# Patient Record
Sex: FEMALE | Race: WHITE | NOT HISPANIC OR LATINO | Employment: PART TIME | URBAN - METROPOLITAN AREA
[De-identification: names, ages, dates, MRNs, and addresses within clinical notes are randomized per-mention and may not be internally consistent; named-entity substitution may affect disease eponyms.]

---

## 2018-02-19 ENCOUNTER — ANESTHESIA - HEALTHEAST (OUTPATIENT)
Dept: SURGERY | Facility: CLINIC | Age: 23
End: 2018-02-19

## 2018-02-20 ENCOUNTER — SURGERY - HEALTHEAST (OUTPATIENT)
Dept: SURGERY | Facility: CLINIC | Age: 23
End: 2018-02-20

## 2018-10-07 ENCOUNTER — COMMUNICATION - HEALTHEAST (OUTPATIENT)
Dept: SCHEDULING | Facility: CLINIC | Age: 23
End: 2018-10-07

## 2019-04-26 ENCOUNTER — COMMUNICATION - HEALTHEAST (OUTPATIENT)
Dept: SCHEDULING | Facility: CLINIC | Age: 24
End: 2019-04-26

## 2019-07-16 ENCOUNTER — RECORDS - HEALTHEAST (OUTPATIENT)
Dept: LAB | Facility: CLINIC | Age: 24
End: 2019-07-16

## 2019-07-16 LAB
ALT SERPL W P-5'-P-CCNC: 9 U/L (ref 0–45)
AST SERPL W P-5'-P-CCNC: 11 U/L (ref 0–40)
BASOPHILS # BLD AUTO: 0 THOU/UL (ref 0–0.2)
BASOPHILS NFR BLD AUTO: 0 % (ref 0–2)
CREAT SERPL-MCNC: 0.57 MG/DL (ref 0.6–1.1)
CREAT UR-MCNC: 39.1 MG/DL
EOSINOPHIL # BLD AUTO: 0.1 THOU/UL (ref 0–0.4)
EOSINOPHIL NFR BLD AUTO: 0 % (ref 0–6)
ERYTHROCYTE [DISTWIDTH] IN BLOOD BY AUTOMATED COUNT: 12 % (ref 11–14.5)
GFR SERPL CREATININE-BSD FRML MDRD: >60 ML/MIN/1.73M2
HCT VFR BLD AUTO: 32 % (ref 35–47)
HGB BLD-MCNC: 11.1 G/DL (ref 12–16)
LYMPHOCYTES # BLD AUTO: 2.5 THOU/UL (ref 0.8–4.4)
LYMPHOCYTES NFR BLD AUTO: 14 % (ref 20–40)
MCH RBC QN AUTO: 33 PG (ref 27–34)
MCHC RBC AUTO-ENTMCNC: 34.7 G/DL (ref 32–36)
MCV RBC AUTO: 95 FL (ref 80–100)
MONOCYTES # BLD AUTO: 1.1 THOU/UL (ref 0–0.9)
MONOCYTES NFR BLD AUTO: 6 % (ref 2–10)
NEUTROPHILS # BLD AUTO: 14 THOU/UL (ref 2–7.7)
NEUTROPHILS NFR BLD AUTO: 79 % (ref 50–70)
PLATELET # BLD AUTO: 358 THOU/UL (ref 140–440)
PMV BLD AUTO: 10.4 FL (ref 8.5–12.5)
PROTEIN, RANDOM URINE - HISTORICAL: 8 MG/DL
PROTEIN/CREAT RATIO, RANDOM UR: 0.2
RBC # BLD AUTO: 3.36 MILL/UL (ref 3.8–5.4)
URATE SERPL-MCNC: 2.9 MG/DL (ref 2–7.5)
WBC: 17.9 THOU/UL (ref 4–11)

## 2019-08-14 ENCOUNTER — RECORDS - HEALTHEAST (OUTPATIENT)
Dept: ADMINISTRATIVE | Facility: OTHER | Age: 24
End: 2019-08-14

## 2019-08-14 ENCOUNTER — HOSPITAL ENCOUNTER (OUTPATIENT)
Dept: MEDSURG UNIT | Facility: CLINIC | Age: 24
Discharge: HOME | End: 2019-08-14
Attending: OBSTETRICS & GYNECOLOGY | Admitting: OBSTETRICS & GYNECOLOGY

## 2019-08-14 LAB
ALBUMIN UR-MCNC: NEGATIVE MG/DL
AMPHETAMINES UR QL SCN: NORMAL
APPEARANCE UR: CLEAR
BARBITURATES UR QL: NORMAL
BENZODIAZ UR QL: NORMAL
BILIRUB UR QL STRIP: NEGATIVE
CANNABINOIDS UR QL SCN: NORMAL
CLUE CELLS: NORMAL
COCAINE UR QL: NORMAL
COLOR UR AUTO: NORMAL
CREAT UR-MCNC: 12.4 MG/DL
ETHANOL UR CFM-MCNC: <10 MG/DL
FIBRONECTIN FETAL VAG QL: POSITIVE
GLUCOSE UR STRIP-MCNC: NEGATIVE MG/DL
HGB UR QL STRIP: NEGATIVE
KETONES UR STRIP-MCNC: NEGATIVE MG/DL
LEUKOCYTE ESTERASE UR QL STRIP: NEGATIVE
METHADONE UR QL SCN: NORMAL
NITRATE UR QL: NEGATIVE
OPIATES UR QL SCN: NORMAL
OXYCODONE UR QL: NORMAL
PCP UR QL SCN: NORMAL
PH UR STRIP: 8 [PH] (ref 4.5–8)
RUPTURE OF FETAL MEMBRANES BY ROM PLUS: NEGATIVE
SP GR UR STRIP: 1 (ref 1–1.03)
TRICHOMONAS, WET PREP: NORMAL
UROBILINOGEN UR STRIP-ACNC: NORMAL
YEAST, WET PREP: NORMAL

## 2019-08-14 ASSESSMENT — MIFFLIN-ST. JEOR: SCORE: 1506.11

## 2019-08-16 LAB
ALLERGIC TO PENICILLIN: NORMAL
GP B STREP DNA SPEC QL NAA+PROBE: NEGATIVE

## 2019-09-09 ENCOUNTER — ANESTHESIA - HEALTHEAST (OUTPATIENT)
Dept: OBGYN | Facility: CLINIC | Age: 24
End: 2019-09-09

## 2019-09-13 ENCOUNTER — COMMUNICATION - HEALTHEAST (OUTPATIENT)
Dept: OBGYN | Facility: CLINIC | Age: 24
End: 2019-09-13

## 2019-10-17 ENCOUNTER — AMBULATORY - HEALTHEAST (OUTPATIENT)
Dept: NURSING | Facility: CLINIC | Age: 24
End: 2019-10-17

## 2019-12-10 ENCOUNTER — RECORDS - HEALTHEAST (OUTPATIENT)
Dept: ADMINISTRATIVE | Facility: OTHER | Age: 24
End: 2019-12-10

## 2019-12-10 ASSESSMENT — MIFFLIN-ST. JEOR
SCORE: 1395.89
SCORE: 1415.4

## 2019-12-11 ENCOUNTER — SURGERY - HEALTHEAST (OUTPATIENT)
Dept: SURGERY | Facility: HOSPITAL | Age: 24
End: 2019-12-11

## 2019-12-11 ENCOUNTER — ANESTHESIA - HEALTHEAST (OUTPATIENT)
Dept: MEDSURG UNIT | Facility: HOSPITAL | Age: 24
End: 2019-12-11

## 2019-12-11 ENCOUNTER — ANESTHESIA - HEALTHEAST (OUTPATIENT)
Dept: SURGERY | Facility: HOSPITAL | Age: 24
End: 2019-12-11

## 2019-12-11 ENCOUNTER — RECORDS - HEALTHEAST (OUTPATIENT)
Dept: ADMINISTRATIVE | Facility: OTHER | Age: 24
End: 2019-12-11

## 2019-12-11 ASSESSMENT — MIFFLIN-ST. JEOR: SCORE: 1405.87

## 2019-12-12 ENCOUNTER — COMMUNICATION - HEALTHEAST (OUTPATIENT)
Dept: SURGERY | Facility: CLINIC | Age: 24
End: 2019-12-12

## 2019-12-18 ENCOUNTER — SURGERY - HEALTHEAST (OUTPATIENT)
Dept: SURGERY | Facility: HOSPITAL | Age: 24
End: 2019-12-18

## 2019-12-18 ENCOUNTER — ANESTHESIA - HEALTHEAST (OUTPATIENT)
Dept: SURGERY | Facility: HOSPITAL | Age: 24
End: 2019-12-18

## 2019-12-18 ASSESSMENT — MIFFLIN-ST. JEOR
SCORE: 1392.72
SCORE: 1401.79

## 2019-12-31 ENCOUNTER — OFFICE VISIT - HEALTHEAST (OUTPATIENT)
Dept: SURGERY | Facility: CLINIC | Age: 24
End: 2019-12-31

## 2020-01-14 ENCOUNTER — OFFICE VISIT - HEALTHEAST (OUTPATIENT)
Dept: SURGERY | Facility: CLINIC | Age: 25
End: 2020-01-14

## 2020-01-14 ENCOUNTER — HOSPITAL ENCOUNTER (OUTPATIENT)
Dept: CT IMAGING | Facility: HOSPITAL | Age: 25
Discharge: HOME | End: 2020-01-14
Attending: SURGERY

## 2020-01-14 DIAGNOSIS — K85.10 GALLSTONE PANCREATITIS: ICD-10-CM

## 2020-01-14 ASSESSMENT — MIFFLIN-ST. JEOR: SCORE: 1383.18

## 2020-01-16 ENCOUNTER — COMMUNICATION - HEALTHEAST (OUTPATIENT)
Dept: SURGERY | Facility: CLINIC | Age: 25
End: 2020-01-16

## 2020-02-27 ENCOUNTER — ANESTHESIA - HEALTHEAST (OUTPATIENT)
Dept: SURGERY | Facility: HOSPITAL | Age: 25
End: 2020-02-27

## 2020-02-28 ENCOUNTER — SURGERY - HEALTHEAST (OUTPATIENT)
Dept: SURGERY | Facility: HOSPITAL | Age: 25
End: 2020-02-28

## 2020-04-27 ENCOUNTER — COMMUNICATION - HEALTHEAST (OUTPATIENT)
Dept: SCHEDULING | Facility: CLINIC | Age: 25
End: 2020-04-27

## 2020-04-28 ENCOUNTER — DOCUMENTATION ONLY (OUTPATIENT)
Dept: OTHER | Facility: CLINIC | Age: 25
End: 2020-04-28

## 2020-04-28 ENCOUNTER — AMBULATORY - HEALTHEAST (OUTPATIENT)
Dept: OTHER | Facility: CLINIC | Age: 25
End: 2020-04-28

## 2020-07-02 ENCOUNTER — COMMUNICATION - HEALTHEAST (OUTPATIENT)
Dept: SURGERY | Facility: CLINIC | Age: 25
End: 2020-07-02

## 2020-09-10 ENCOUNTER — AMBULATORY - HEALTHEAST (OUTPATIENT)
Dept: NURSING | Facility: CLINIC | Age: 25
End: 2020-09-10

## 2020-11-17 ENCOUNTER — RECORDS - HEALTHEAST (OUTPATIENT)
Dept: ADMINISTRATIVE | Facility: OTHER | Age: 25
End: 2020-11-17

## 2021-03-02 ENCOUNTER — COMMUNICATION - HEALTHEAST (OUTPATIENT)
Dept: SURGERY | Facility: CLINIC | Age: 26
End: 2021-03-02

## 2021-05-28 NOTE — TELEPHONE ENCOUNTER
"Pt reports she is 17 weeks pregnant and she was seen in the ED yesterday. R leg and arm numbness since yesterday and now in L arm since an hour ago as well as \"8\" headache. Nosebleed this morning. Per ER notes pt had an incomplete work up and was to return if symptoms worsened. Pt reports symptoms have worsened.    Advised pt to return to ER now. Have another adult drive.    Pt verbalizes understanding and agrees to plan.     Reason for Disposition    Nursing judgment or information in reference    Protocols used: NO GUIDELINE [id number]      "

## 2021-05-31 NOTE — PROGRESS NOTES
Patient given discharge instructions, pt has all belongings; questions answered. Pt was able to ambulate independently off unit without difficulty. Pt knows to follow up with her provider at next clinic visit.

## 2021-05-31 NOTE — PROGRESS NOTES
pt at 33 weeks day of Dr Lyons. Pt comes in c/o abd pain starting last night and continuing when she woke this morning.  She denies leaking of fluids.  Pt reports loose stool x 1 with a quarter size of blood.

## 2021-06-01 ENCOUNTER — RECORDS - HEALTHEAST (OUTPATIENT)
Dept: ADMINISTRATIVE | Facility: CLINIC | Age: 26
End: 2021-06-01

## 2021-06-01 VITALS — WEIGHT: 152.19 LBS | BODY MASS INDEX: 25.33 KG/M2

## 2021-06-01 NOTE — ANESTHESIA PREPROCEDURE EVALUATION
Anesthesia Evaluation      Patient summary reviewed   History of anesthetic complications     Airway   Mallampati: II  Neck ROM: full   Pulmonary                           Cardiovascular - negative ROS  Exercise tolerance: > or = 4 METS   Neuro/Psych - negative ROS     Endo/Other    (+) obesity, pregnant     GI/Hepatic/Renal - negative ROS           Dental                         Anesthesia Plan  Planned anesthetic: epidural  LE  ASA 2     Anesthetic plan and risks discussed with: patient    Post-op plan: routine recovery

## 2021-06-01 NOTE — TELEPHONE ENCOUNTER
:   N/A    Language:   English    Discharge Follow-Up:  Follow-Up call by Outreach nurse: Call placed    Type of Delivery:      Feeding Method:  JOHANA    Feedings in 24Hrs:  >8x/Day    Breast engorgement:   N/A    Nipple tenderness:   N/A    Non-nursing engorgement discussed:   Yes    Amount of Feedin-2 oz    Number of Infant Stools in 24 hours:   >3    Stool:  Transition    Number of Infant voids in 24 hours:  >3    Urine:  Clear    Infant Jaundice:   None    Discussed increasing s/s of Jaundice:   Yes    Circumcision:   Circ on TUes in clinic    Maternal s/s of infection:   None    Maternal s/s of PIH:   WDL    Postpartum cramping:   Mild    Comfort:  Adequate with routine pain meds    Vaginal Bleeding:  WDL    S/S of Maternal Thrombosis:  None    Maternal BM Since Delivery:  Yes    Referrals:   None    Resources Used During Phone Call:  HEPS    Comments:   Spoke with pt/mom and she states that things are going well overall. NB is johana feeding and mom states he is taking 2 oz per feeding. Was at clinic yesterday and had no concerns. Plan to fu on Tues for wt check and circ. Mom denies any concerns for herself. Edu disused. Questions answered.    Completed by:   Ximena Guillen RN

## 2021-06-01 NOTE — ANESTHESIA PROCEDURE NOTES
Epidural Block    Patient location during procedure: OB  Time Called: 9/9/2019 11:27 AM  Reason for Block:labor epidural  Staffing:  Performing  Anesthesiologist: Garth Mancera MD  Preanesthetic Checklist  Completed: patient identified, risks, benefits, and alternatives discussed, timeout performed, consent obtained, at patient's request, airway assessed, oxygen available, suction available, emergency drugs available and hand hygiene performed  Procedure  Patient position: sitting  Prep: ChloraPrep  Patient monitoring: continuous pulse oximetry, heart rate and blood pressure  Approach: midline  Location: L3-L4  Injection technique: DAYSI saline  Number of Attempts:2 (Blood with first catheter, replaced at the same level)  Needle  Needle type: Waylon   Needle gauge: 18 G     Catheter in Space: 4 (5 cm to EDS)  Assessment  Sensory level:  No complications    Events: blood aspirated

## 2021-06-01 NOTE — ANESTHESIA POSTPROCEDURE EVALUATION
Patient: Renata Staton  * No procedures listed *  Anesthesia type: No value filed.    Patient location: PACU  Last vitals: No vitals data found for the desired time range.    Post vital signs: stable  Level of consciousness: awake and responds to simple questions  Post-anesthesia pain: pain controlled  Post-anesthesia nausea and vomiting: no  Pulmonary: unassisted, return to baseline  Cardiovascular: stable and blood pressure at baseline  Hydration: adequate  Anesthetic events: no    QCDR Measures:  ASA# 11 - Naty-op Cardiac Arrest: ASA11B - Patient did NOT experience unanticipated cardiac arrest  ASA# 12 - Naty-op Mortality Rate: ASA12B - Patient did NOT die  ASA# 13 - PACU Re-Intubation Rate: ASA13B - Patient did NOT require a new airway mgmt  ASA# 10 - Composite Anes Safety: ASA10A - No serious adverse event    Additional Notes:

## 2021-06-02 ENCOUNTER — RECORDS - HEALTHEAST (OUTPATIENT)
Dept: ADMINISTRATIVE | Facility: CLINIC | Age: 26
End: 2021-06-02

## 2021-06-03 VITALS — WEIGHT: 170 LBS | BODY MASS INDEX: 29.02 KG/M2 | HEIGHT: 64 IN

## 2021-06-04 VITALS
HEIGHT: 64 IN | BODY MASS INDEX: 30.04 KG/M2 | WEIGHT: 150 LBS | BODY MASS INDEX: 25.01 KG/M2 | BODY MASS INDEX: 25.75 KG/M2 | HEIGHT: 64 IN | BODY MASS INDEX: 30.04 KG/M2 | WEIGHT: 145.7 LBS

## 2021-06-04 VITALS
BODY MASS INDEX: 25.25 KG/M2 | HEIGHT: 64 IN | WEIGHT: 147.9 LBS | HEIGHT: 64 IN | WEIGHT: 147.9 LBS | BODY MASS INDEX: 25.01 KG/M2 | BODY MASS INDEX: 25.39 KG/M2

## 2021-06-04 VITALS — WEIGHT: 147 LBS | HEIGHT: 64 IN | BODY MASS INDEX: 25.1 KG/M2

## 2021-06-04 NOTE — ANESTHESIA PREPROCEDURE EVALUATION
Anesthesia Evaluation      Patient summary reviewed   History of anesthetic complications (family hx of pseudocholinesterase deficiency)     Airway   Mallampati: II  Neck ROM: full   Pulmonary     breath sounds clear to auscultation  (+) a smoker  (-) asthma                         Cardiovascular - negative ROS  Exercise tolerance: > or = 4 METS  Rhythm: regular        Neuro/Psych      Endo/Other - negative ROS   (-) not pregnant     GI/Hepatic/Renal      Comments: Elevated AST s/p lap albin     Other findings:     NPO > 8 hrs     Results for JULIO ASCENCIO (MRN [id number]) as of 12/18/2019 12/18/2019 08:16  Sodium: 139  Potassium: 3.9  Chloride: 106  CO2: 25  Anion Gap, Calculation: 8  BUN: 11  Creatinine: 0.74  GFR MDRD Af Amer: >60  GFR MDRD Non Af Amer: >60  Calcium: 9.5  AST: 1,225 (H)  ALT: 635 (H)  ALBUMIN: 4.0  Protein, Total: 7.3  Alkaline Phosphatase: 258 (H)  Bilirubin, Total: 1.5 (H)  Bilirubin, Direct: 0.7 (H)  Glucose: 68 (L)  Lipase: 18,520 (H)  WBC: 9.0  RBC: 4.61  Hemoglobin: 13.3  Hematocrit: 40.2  MCV: 87  MCH: 28.9  MCHC: 33.1  RDW: 13.2  Platelets: 367  MPV: 9.7        Dental - normal exam                        Anesthesia Plan  Planned anesthetic: general endotracheal      Propofol gtt  Scop patch  Ketamine  Dilaudid 0.5 mg  No sux    ASA 2 - emergent   Induction: intravenous   Anesthetic plan and risks discussed with: patient  Anesthesia plan special considerations: antiemetics,   Post-op plan: routine recovery

## 2021-06-04 NOTE — ANESTHESIA CARE TRANSFER NOTE
Last vitals:   Vitals:    12/18/19 1807   BP: 138/60   Pulse: 74   Resp: 14   Temp: 36.8  C (98.3  F)   SpO2: 100%     Patient's level of consciousness is drowsy but awake. VSS. Appears comfortable.   Spontaneous respirations: yes  Maintains airway independently: yes  Dentition unchanged: yes  Oropharynx: oropharynx clear of all foreign objects    QCDR Measures:  ASA# 20 - Surgical Safety Checklist: WHO surgical safety checklist completed prior to induction    PQRS# 430 - Adult PONV Prevention: 4558F - Pt received => 2 anti-emetic agents (different classes) preop & intraop  ASA# 8 - Peds PONV Prevention: NA - Not pediatric patient, not GA or 2 or more risk factors NOT present  PQRS# 424 - Naty-op Temp Management: 4559F - At least one body temp DOCUMENTED => 35.5C or 95.9F within required timeframe  PQRS# 426 - PACU Transfer Protocol: - Transfer of care checklist used  ASA# 14 - Acute Post-op Pain: ASA14B - Patient did NOT experience pain >= 7 out of 10

## 2021-06-04 NOTE — ANESTHESIA POSTPROCEDURE EVALUATION
Patient: Renata Staton  CHOLECYSTECTOMY, LAPAROSCOPIC, WITH INTRAOP CHOLANGIOGRAMS  Anesthesia type: general    Patient location: PACU  Last vitals:   Vitals Value Taken Time   /65 12/11/2019 12:30 PM   Temp 36.9  C (98.5  F) 12/11/2019 12:30 PM   Pulse 95 12/11/2019 12:30 PM   Resp 16 12/11/2019 12:30 PM   SpO2 95 % 12/11/2019 12:30 PM     Post vital signs: stable  Level of consciousness: awake and responds to simple questions  Post-anesthesia pain: pain controlled  Post-anesthesia nausea and vomiting: no  Pulmonary: unassisted, return to baseline  Cardiovascular: stable and blood pressure at baseline  Hydration: adequate  Anesthetic events: no    QCDR Measures:  ASA# 11 - Naty-op Cardiac Arrest: ASA11B - Patient did NOT experience unanticipated cardiac arrest  ASA# 12 - Naty-op Mortality Rate: ASA12B - Patient did NOT die  ASA# 13 - PACU Re-Intubation Rate: ASA13B - Patient did NOT require a new airway mgmt  ASA# 10 - Composite Anes Safety: ASA10A - No serious adverse event    Additional Notes:

## 2021-06-04 NOTE — ANESTHESIA CARE TRANSFER NOTE
Last vitals:   Vitals:    12/11/19 0949   BP: 123/58   Pulse: 96   Resp: 16   Temp: 37.1  C (98.8  F)   SpO2: 100%     Patient's level of consciousness is awake and drowsy  Spontaneous respirations: yes  Maintains airway independently: yes  Dentition unchanged: yes  Oropharynx: oropharynx clear of all foreign objects    QCDR Measures:  ASA# 20 - Surgical Safety Checklist: WHO surgical safety checklist completed prior to induction    PQRS# 430 - Adult PONV Prevention: 4558F - Pt received => 2 anti-emetic agents (different classes) preop & intraop  ASA# 8 - Peds PONV Prevention: NA - Not pediatric patient, not GA or 2 or more risk factors NOT present  PQRS# 424 - Naty-op Temp Management: 4559F - At least one body temp DOCUMENTED => 35.5C or 95.9F within required timeframe  PQRS# 426 - PACU Transfer Protocol: - Transfer of care checklist used  ASA# 14 - Acute Post-op Pain: ASA14B - Patient did NOT experience pain >= 7 out of 10 additional 0.5mg dilaudid given upon arrival to PACU    Volatile agents turned off, muscle relaxant reversed, 4/4 twitches with sustained tetany. Pt breathing spontaneously with adequate tidal volumes, following commands, gently suctioned oropharynx, extubated without issue. 10LPM O2 applied via face mask.Transported by CRNA and RN to recovery.

## 2021-06-04 NOTE — ANESTHESIA PREPROCEDURE EVALUATION
Anesthesia Evaluation      History of anesthetic complications     Airway   Mallampati: II  Neck ROM: full   Pulmonary     breath sounds clear to auscultation  (-) not a smoker                         Cardiovascular   Exercise tolerance: > or = 4 METS  (-) hypertension  Rhythm: regular  Rate: normal,         Neuro/Psych    (+) anxiety/panic attacks,     Endo/Other    (-) no diabetes, no obesity, not pregnant     GI/Hepatic/Renal      Comments: Acute pancreatitis, lipase trending down  Acute cholecystitis      Other findings: Family hx of pseudocholinesterase deficiency.  Hbg 11.6  Plt 275,000  Na 140  K 3.6  BUN/Cr 5/0.61  AST/ALT elevated  Lipase trending down   Preg negative      Dental    (+) poor dentition    Comment: No loose or removable teeth.                        Anesthesia Plan  Planned anesthetic: general endotracheal  GETA (airway exam reassuring, DL 7.0 ETT)  1 PIV  Propofol gtt  Fentanyl, ketamine 30 mg on induction, ketorolac 15 mg if ok w/ Dr. Portillo   Scopolamine, dexamethasone 10 mg, ondansetron 4 mg  ASA 2   Induction: intravenous   Anesthetic plan and risks discussed with: patient  Anesthesia plan special considerations: antiemetics,   Post-op plan: routine recovery

## 2021-06-04 NOTE — ANESTHESIA POSTPROCEDURE EVALUATION
Patient: Renata Staton  ENDOSCOPIC RETROGRADE CHOLANGIOPANCREATOGRAPHY with billary sphinctomy and stone extraction and  billary stent placement with epinephrine injection   Anesthesia type: general    Patient location: PACU  Last vitals:   Vitals Value Taken Time   /78 12/18/2019  6:31 PM   Temp 36.8  C (98.3  F) 12/18/2019  6:07 PM   Pulse 64 12/18/2019  6:37 PM   Resp 22 12/18/2019  6:37 PM   SpO2 97 % 12/18/2019  6:37 PM   Vitals shown include unvalidated device data.  Post vital signs: stable  Level of consciousness: awake and responds to simple questions  Post-anesthesia pain: pain controlled  Post-anesthesia nausea and vomiting: no  Pulmonary: unassisted, return to baseline  Cardiovascular: stable and blood pressure at baseline  Hydration: adequate  Anesthetic events: no    QCDR Measures:  ASA# 11 - Naty-op Cardiac Arrest: ASA11B - Patient did NOT experience unanticipated cardiac arrest  ASA# 12 - Naty-op Mortality Rate: ASA12B - Patient did NOT die  ASA# 13 - PACU Re-Intubation Rate: ASA13B - Patient did NOT require a new airway mgmt  ASA# 10 - Composite Anes Safety: ASA10A - No serious adverse event    Additional Notes:

## 2021-06-05 VITALS — WEIGHT: 132 LBS | BODY MASS INDEX: 21.97 KG/M2

## 2021-06-05 NOTE — TELEPHONE ENCOUNTER
Pts Care Provider: Dr. Portillo    Caller: Gloria    Phone Number: [phone]  OK to leave message: Yes    Reason for Call: PT is returning Dr. Portillo's calls from 1/15/20. She seems worried about the amount of times she reached out to her.    Please call

## 2021-06-05 NOTE — PROGRESS NOTES
"General Surgery Clinic Note    S: Pt presents s/p lap albin with IOC 12/11 for gallstone pancreatitis. Pt with retained CBD stone postop and required readmission for ERCP 12/18. Pt states since discharge, she has had persistent epigastric pain radiating to her back. The pain is mild, 3-4/10 but still interferes with daily activities and has caused her to have a decreased appetite. She has also had diarrhea since discharge. She was seen in ER on 1/11 and noted to have normal labs so was discharged and scheduled for followup today. Pt denies fever, chills, nausea, emesis.    O:   Vitals-   Vitals:    01/14/20 1418   BP: 100/70   Pulse: 81   SpO2: 98%   Weight: 144 lb (65.3 kg)   Height: 5' 4\" (1.626 m)     Gen- alert, pleasant, appears well  Abd- soft, nondistended, mild tenderness to palpation epigastrium, no rebound or guarding, incisions well-healed, no erythema or drainage    A/P: 24y F with h/o gallstone pancreatitis s/p lap albin with IOC 12/11 and ERCP 12/18. Patient with persistent epigastric pain but her recent labs were normal making recurrent pancreatitis or choledocholithiasis unlikely. Given she has had 2 episodes of severe pancreatitis (lipase >18,500 on last admission), I am concerned for possible pseudocyst.    -CT A/P with IV contrast ordered as outpatient  -Further recommendations and followup depending on results of imaging    Samia Portillo MD  General Surgery  [facility]   [phone]      "

## 2021-06-06 NOTE — ANESTHESIA PREPROCEDURE EVALUATION
Anesthesia Evaluation      Patient summary reviewed   History of anesthetic complications (patient has FH of slow to wake, she denies any)     Airway   Mallampati: II  Neck ROM: full   Pulmonary - normal exam                          Cardiovascular   Exercise tolerance: > or = 4 METS  Rhythm: regular  Rate: normal,         Neuro/Psych - negative ROS     Endo/Other - negative ROS      GI/Hepatic/Renal - negative ROS           Dental                         Anesthesia Plan  Planned anesthetic: MAC    ASA 2     Anesthetic plan and risks discussed with: patient    Post-op plan: routine recovery      Patient rates pain 7/10 before stating.  She denies taking opioids.

## 2021-06-06 NOTE — ANESTHESIA POSTPROCEDURE EVALUATION
Patient: Renata Staton  Procedure(s):  ENDOSCOPIC RETROGRADE CHOLANGIOPANCREATOGRAPHY, STENT REMOVAL, SLUDGE REMOVAL  Anesthesia type: MAC    Patient location: PACU  Last vitals:   Vitals Value Taken Time   /61 2/28/2020  8:00 AM   Temp 36.9  C (98.4  F) 2/28/2020  7:50 AM   Pulse 78 2/28/2020  8:13 AM   Resp 15 2/28/2020  8:13 AM   SpO2 98 % 2/28/2020  8:13 AM   Vitals shown include unvalidated device data.  Post vital signs: stable  Level of consciousness: awake and responds to simple questions  Post-anesthesia pain: pain controlled  Post-anesthesia nausea and vomiting: no  Pulmonary: unassisted, return to baseline  Cardiovascular: stable and blood pressure at baseline  Hydration: adequate  Anesthetic events: no    QCDR Measures:  ASA# 11 - Naty-op Cardiac Arrest: ASA11B - Patient did NOT experience unanticipated cardiac arrest  ASA# 12 - Naty-op Mortality Rate: ASA12B - Patient did NOT die  ASA# 13 - PACU Re-Intubation Rate: NA - No ETT / LMA used for case  ASA# 10 - Composite Anes Safety: ASA10A - No serious adverse event    Additional Notes:

## 2021-06-06 NOTE — ANESTHESIA CARE TRANSFER NOTE
Last vitals:   Vitals:    02/28/20 0534   BP: 114/71   Pulse: (!) 105   Resp: 16   Temp: 36.8  C (98.2  F)   SpO2: 100%     Patient's level of consciousness is drowsy  Spontaneous respirations: yes  Maintains airway independently: yes  Dentition unchanged: yes  Oropharynx: oropharynx clear of all foreign objects    QCDR Measures:  ASA# 20 - Surgical Safety Checklist: WHO surgical safety checklist completed prior to induction    PQRS# 430 - Adult PONV Prevention: 4558F - Pt received => 2 anti-emetic agents (different classes) preop & intraop  ASA# 8 - Peds PONV Prevention: NA - Not pediatric patient, not GA or 2 or more risk factors NOT present  PQRS# 424 - Naty-op Temp Management: 4559F - At least one body temp DOCUMENTED => 35.5C or 95.9F within required timeframe  PQRS# 426 - PACU Transfer Protocol: - Transfer of care checklist used  ASA# 14 - Acute Post-op Pain: ASA14B - Patient did NOT experience pain >= 7 out of 10

## 2021-06-07 NOTE — TELEPHONE ENCOUNTER
"Pt reports \"tightness in chest, tingling and numbness in throat and tongue\" a half hour after taking Imitrex. Pt reports \"I can't breath in deep\". Pt is able to speak in full sentences and is not wheezing or gasping for air. Pt states she called pharmacist who told her symptoms may be from an allergic reaction. Pt is with her boyfriend.    Advised pt to have her boyfriend bring her to the ER now. If symptoms worsen at all call 911.    Pt verbalizes understanding and agrees to plan.     Reason for Disposition    [1] Tightness in the chest or throat AND [2] begins within 2 hours of exposure to allergic substance    Protocols used: [id number]      "

## 2021-06-09 NOTE — TELEPHONE ENCOUNTER
Received a voicemail from Gloriahailee gonzales at 12:45pm. She stated that she had her gallbladder removed by Dr. portillo on 12/11/19 but is still experiencing abdominal pain. She wants to know what she needs to do.     Will route the message to Dr. Portillo and her clinical staff.    [facility], General Surgery  Surgery Scheduler  [phone] (Direct Line)  [phone] (Main Line)

## 2021-06-15 NOTE — TELEPHONE ENCOUNTER
"Gloria called today with concerns of RUQ abdominal pain that feels \"just like gallbladder attacks\" however she is s/p laparoscopic cholecystectomy on 12/11/2019 and ERCP for stent and sludge removal on 2/28/2020.     The pain came on suddenly last night. She describes it has constant, sharp, and is worse when moving or taking a breath in. Eating makes the pain worse. She has nausea, but no vomiting. Denies fever or chills. She reports she has still continued to struggle with diarrhea since her gallbladder was removed.     I will send this message to Dr. Portillo for review and call the pt back with further recommendations.   "

## 2021-06-16 PROBLEM — F17.200 SMOKER: Status: ACTIVE | Noted: 2019-01-10

## 2021-06-16 PROBLEM — F32.A ANXIETY AND DEPRESSION: Status: ACTIVE | Noted: 2018-08-17

## 2021-06-16 PROBLEM — F41.9 ANXIETY AND DEPRESSION: Status: ACTIVE | Noted: 2018-08-17

## 2021-06-16 PROBLEM — O99.330 TOBACCO USE IN PREGNANCY: Status: ACTIVE | Noted: 2019-09-09

## 2021-06-16 PROBLEM — R33.9 URINARY RETENTION: Status: ACTIVE | Noted: 2019-12-11

## 2021-06-16 PROBLEM — K85.10 ACUTE GALLSTONE PANCREATITIS: Status: ACTIVE | Noted: 2019-12-10

## 2021-06-16 PROBLEM — N76.0 VAGINAL INFECTION: Status: ACTIVE | Noted: 2019-09-20

## 2021-06-16 PROBLEM — K85.90 ACUTE PANCREATITIS WITHOUT INFECTION OR NECROSIS: Status: ACTIVE | Noted: 2019-12-10

## 2021-06-16 PROBLEM — G89.29 CHRONIC PELVIC PAIN IN FEMALE: Status: ACTIVE | Noted: 2018-02-15

## 2021-06-16 PROBLEM — R10.2 CHRONIC PELVIC PAIN IN FEMALE: Status: ACTIVE | Noted: 2018-02-15

## 2021-06-16 PROBLEM — F41.9 ANXIETY: Status: ACTIVE | Noted: 2019-09-09

## 2021-06-16 PROBLEM — K85.90 ACUTE PANCREATITIS, UNSPECIFIED COMPLICATION STATUS, UNSPECIFIED PANCREATITIS TYPE: Status: ACTIVE | Noted: 2019-12-10

## 2021-06-16 PROBLEM — E53.8 B12 DEFICIENCY: Status: ACTIVE | Noted: 2021-01-06

## 2021-06-16 NOTE — ANESTHESIA POSTPROCEDURE EVALUATION
Patient: Renata Staton  DIAGNOSTIC LAPAROSCOPY, LYSIS OF  ADHESIONS  Anesthesia type: general    Patient location: Phase II Recovery  Last vitals:   Vitals:    02/20/18 1000   BP:    Pulse:    Resp:    Temp: 37.3  C (99.1  F)   SpO2:      Post vital signs: stable  Level of consciousness: awake and responds to simple questions  Post-anesthesia pain: pain controlled  Post-anesthesia nausea and vomiting: no  Pulmonary: unassisted, return to baseline  Cardiovascular: stable and blood pressure at baseline  Hydration: adequate  Anesthetic events: no    QCDR Measures:  ASA# 11 - Naty-op Cardiac Arrest: ASA11B - Patient did NOT experience unanticipated cardiac arrest  ASA# 12 - Naty-op Mortality Rate: ASA12B - Patient did NOT die  ASA# 13 - PACU Re-Intubation Rate: ASA13B - Patient did NOT require a new airway mgmt  ASA# 10 - Composite Anes Safety: ASA10A - No serious adverse event    Additional Notes:

## 2021-06-16 NOTE — ANESTHESIA PREPROCEDURE EVALUATION
Anesthesia Evaluation      Patient summary reviewed   No history of anesthetic complications (pseudocholinesterase deficiency in family.  No hx MH.)     Airway   Mallampati: II  Neck ROM: full   Pulmonary - negative ROS and normal exam                          Cardiovascular - negative ROS and normal exam  Rhythm: regular  Rate: normal,         Neuro/Psych - negative ROS     Endo/Other - negative ROS      GI/Hepatic/Renal - negative ROS           Dental - normal exam                        Anesthesia Plan  Planned anesthetic: general endotracheal    ASA 1   Induction: intravenous   Anesthetic plan and risks discussed with: patient    Post-op plan: routine recovery

## 2021-06-16 NOTE — ANESTHESIA CARE TRANSFER NOTE
Last vitals:   Vitals:    02/20/18 0855   BP: 113/76   Pulse: 89   Resp: 16   Temp: 36.6  C (97.9  F)   SpO2: 99%     Patient's level of consciousness is drowsy  Spontaneous respirations: yes  Maintains airway independently: yes  Dentition unchanged: yes  Oropharynx: oropharynx clear of all foreign objects    QCDR Measures:  ASA# 20 - Surgical Safety Checklist: WHO surgical safety checklist completed prior to induction  PQRS# 430 - Adult PONV Prevention: 4558F - Pt received => 2 anti-emetic agents (different classes) preop & intraop  ASA# 8 - Peds PONV Prevention: NA - Not pediatric patient, not GA or 2 or more risk factors NOT present  PQRS# 424 - Naty-op Temp Management: 4559F - At least one body temp DOCUMENTED => 35.5C or 95.9F within required timeframe  PQRS# 426 - PACU Transfer Protocol: - Transfer of care checklist used  ASA# 14 - Acute Post-op Pain: ASA14B - Patient did NOT experience pain >= 7 out of 10

## 2021-06-20 NOTE — LETTER
Letter by Samia Portillo MD at      Author: Samia Portillo MD Service: -- Author Type: --    Filed:  Encounter Date: 12/12/2019 Status: Signed         December 12, 2019     Patient: Renata Staton   YOB: 1995   Date of Visit: 12/12/2019       To Whom It May Concern:    Renata Staton is excused from work 12/9-12/16 due to acute illness requiring hospitalization and surgery.    It is my medical opinion that Renata Staton may return to work on 12/16/19 with restriction of no heavy lifting (more than 20lb) until 12/25/19. She may then return to normal duty.     If you have any questions or concerns, please don't hesitate to call.    Sincerely,        Electronically signed by Samia Portillo MD   [phone]

## 2021-06-26 ENCOUNTER — HEALTH MAINTENANCE LETTER (OUTPATIENT)
Age: 26
End: 2021-06-26

## 2021-07-14 PROBLEM — Z34.90 PREGNANT: Status: RESOLVED | Noted: 2019-09-09 | Resolved: 2019-09-10

## 2021-10-16 ENCOUNTER — HEALTH MAINTENANCE LETTER (OUTPATIENT)
Age: 26
End: 2021-10-16

## 2021-12-14 VITALS
DIASTOLIC BLOOD PRESSURE: 81 MMHG | TEMPERATURE: 98.9 F | OXYGEN SATURATION: 98 % | WEIGHT: 130 LBS | BODY MASS INDEX: 21.63 KG/M2 | HEART RATE: 96 BPM | SYSTOLIC BLOOD PRESSURE: 134 MMHG | RESPIRATION RATE: 20 BRPM

## 2021-12-14 LAB
ALBUMIN SERPL-MCNC: 4.4 G/DL (ref 3.5–5)
ALBUMIN UR-MCNC: NEGATIVE MG/DL
ALP SERPL-CCNC: 95 U/L (ref 45–120)
ALT SERPL W P-5'-P-CCNC: 18 U/L (ref 0–45)
ANION GAP SERPL CALCULATED.3IONS-SCNC: 11 MMOL/L (ref 5–18)
APPEARANCE UR: CLEAR
AST SERPL W P-5'-P-CCNC: 20 U/L (ref 0–40)
BACTERIA #/AREA URNS HPF: ABNORMAL /HPF
BASOPHILS # BLD AUTO: 0.1 10E3/UL (ref 0–0.2)
BASOPHILS NFR BLD AUTO: 1 %
BILIRUB DIRECT SERPL-MCNC: 0.2 MG/DL
BILIRUB SERPL-MCNC: 0.4 MG/DL (ref 0–1)
BILIRUB UR QL STRIP: NEGATIVE
BUN SERPL-MCNC: 8 MG/DL (ref 8–22)
CALCIUM SERPL-MCNC: 9.3 MG/DL (ref 8.5–10.5)
CHLORIDE BLD-SCNC: 105 MMOL/L (ref 98–107)
CO2 SERPL-SCNC: 21 MMOL/L (ref 22–31)
COLOR UR AUTO: COLORLESS
CREAT SERPL-MCNC: 0.75 MG/DL (ref 0.6–1.1)
EOSINOPHIL # BLD AUTO: 0.1 10E3/UL (ref 0–0.7)
EOSINOPHIL NFR BLD AUTO: 1 %
ERYTHROCYTE [DISTWIDTH] IN BLOOD BY AUTOMATED COUNT: 12.8 % (ref 10–15)
GFR SERPL CREATININE-BSD FRML MDRD: >90 ML/MIN/1.73M2
GLUCOSE BLD-MCNC: 97 MG/DL (ref 70–125)
GLUCOSE UR STRIP-MCNC: NEGATIVE MG/DL
HCG UR QL: NEGATIVE
HCT VFR BLD AUTO: 36 % (ref 35–47)
HGB BLD-MCNC: 12.1 G/DL (ref 11.7–15.7)
HGB UR QL STRIP: NEGATIVE
IMM GRANULOCYTES # BLD: 0 10E3/UL
IMM GRANULOCYTES NFR BLD: 0 %
INTERNAL QC OK POCT: NORMAL
KETONES UR STRIP-MCNC: 10 MG/DL
LACTATE SERPL-SCNC: 0.9 MMOL/L (ref 0.7–2)
LEUKOCYTE ESTERASE UR QL STRIP: NEGATIVE
LIPASE SERPL-CCNC: 20 U/L (ref 0–52)
LYMPHOCYTES # BLD AUTO: 3.5 10E3/UL (ref 0.8–5.3)
LYMPHOCYTES NFR BLD AUTO: 40 %
MCH RBC QN AUTO: 28.1 PG (ref 26.5–33)
MCHC RBC AUTO-ENTMCNC: 33.6 G/DL (ref 31.5–36.5)
MCV RBC AUTO: 84 FL (ref 78–100)
MONOCYTES # BLD AUTO: 0.6 10E3/UL (ref 0–1.3)
MONOCYTES NFR BLD AUTO: 7 %
NEUTROPHILS # BLD AUTO: 4.7 10E3/UL (ref 1.6–8.3)
NEUTROPHILS NFR BLD AUTO: 51 %
NITRATE UR QL: NEGATIVE
NRBC # BLD AUTO: 0 10E3/UL
NRBC BLD AUTO-RTO: 0 /100
PH UR STRIP: 5.5 [PH] (ref 5–7)
PLATELET # BLD AUTO: 342 10E3/UL (ref 150–450)
POCT KIT EXPIRATION DATE: NORMAL
POCT KIT LOT NUMBER: NORMAL
POTASSIUM BLD-SCNC: 3.6 MMOL/L (ref 3.5–5)
PROT SERPL-MCNC: 7.6 G/DL (ref 6–8)
RBC # BLD AUTO: 4.3 10E6/UL (ref 3.8–5.2)
RBC URINE: 0 /HPF
SODIUM SERPL-SCNC: 137 MMOL/L (ref 136–145)
SP GR UR STRIP: 1.01 (ref 1–1.03)
SQUAMOUS EPITHELIAL: 1 /HPF
UROBILINOGEN UR STRIP-MCNC: <2 MG/DL
WBC # BLD AUTO: 9 10E3/UL (ref 4–11)
WBC URINE: <1 /HPF

## 2021-12-14 PROCEDURE — C9803 HOPD COVID-19 SPEC COLLECT: HCPCS

## 2021-12-14 PROCEDURE — 82947 ASSAY GLUCOSE BLOOD QUANT: CPT | Performed by: STUDENT IN AN ORGANIZED HEALTH CARE EDUCATION/TRAINING PROGRAM

## 2021-12-14 PROCEDURE — 83605 ASSAY OF LACTIC ACID: CPT | Performed by: STUDENT IN AN ORGANIZED HEALTH CARE EDUCATION/TRAINING PROGRAM

## 2021-12-14 PROCEDURE — 85025 COMPLETE CBC W/AUTO DIFF WBC: CPT | Performed by: STUDENT IN AN ORGANIZED HEALTH CARE EDUCATION/TRAINING PROGRAM

## 2021-12-14 PROCEDURE — 80076 HEPATIC FUNCTION PANEL: CPT | Performed by: STUDENT IN AN ORGANIZED HEALTH CARE EDUCATION/TRAINING PROGRAM

## 2021-12-14 PROCEDURE — 81001 URINALYSIS AUTO W/SCOPE: CPT | Performed by: EMERGENCY MEDICINE

## 2021-12-14 PROCEDURE — 83690 ASSAY OF LIPASE: CPT | Performed by: STUDENT IN AN ORGANIZED HEALTH CARE EDUCATION/TRAINING PROGRAM

## 2021-12-14 PROCEDURE — 99284 EMERGENCY DEPT VISIT MOD MDM: CPT | Mod: 25

## 2021-12-14 PROCEDURE — 36415 COLL VENOUS BLD VENIPUNCTURE: CPT | Performed by: STUDENT IN AN ORGANIZED HEALTH CARE EDUCATION/TRAINING PROGRAM

## 2021-12-14 PROCEDURE — 82374 ASSAY BLOOD CARBON DIOXIDE: CPT | Performed by: STUDENT IN AN ORGANIZED HEALTH CARE EDUCATION/TRAINING PROGRAM

## 2021-12-14 PROCEDURE — 81025 URINE PREGNANCY TEST: CPT | Performed by: EMERGENCY MEDICINE

## 2021-12-14 RX ORDER — ACETAMINOPHEN 325 MG/1
975 TABLET ORAL ONCE
Status: COMPLETED | OUTPATIENT
Start: 2021-12-14 | End: 2021-12-15

## 2021-12-15 ENCOUNTER — APPOINTMENT (OUTPATIENT)
Dept: CT IMAGING | Facility: HOSPITAL | Age: 26
End: 2021-12-15
Attending: EMERGENCY MEDICINE
Payer: COMMERCIAL

## 2021-12-15 ENCOUNTER — HOSPITAL ENCOUNTER (EMERGENCY)
Facility: HOSPITAL | Age: 26
Discharge: HOME | End: 2021-12-15
Attending: EMERGENCY MEDICINE | Admitting: EMERGENCY MEDICINE
Payer: COMMERCIAL

## 2021-12-15 DIAGNOSIS — N20.0 CALCULUS OF KIDNEY: ICD-10-CM

## 2021-12-15 DIAGNOSIS — J06.9 UPPER RESPIRATORY TRACT INFECTION, UNSPECIFIED TYPE: ICD-10-CM

## 2021-12-15 DIAGNOSIS — R10.9 FLANK PAIN: ICD-10-CM

## 2021-12-15 LAB
FLUAV RNA SPEC QL NAA+PROBE: NEGATIVE
FLUBV RNA RESP QL NAA+PROBE: NEGATIVE
SARS-COV-2 RNA RESP QL NAA+PROBE: NEGATIVE

## 2021-12-15 PROCEDURE — 74176 CT ABD & PELVIS W/O CONTRAST: CPT

## 2021-12-15 PROCEDURE — 250N000013 HC RX MED GY IP 250 OP 250 PS 637: Performed by: STUDENT IN AN ORGANIZED HEALTH CARE EDUCATION/TRAINING PROGRAM

## 2021-12-15 PROCEDURE — 87636 SARSCOV2 & INF A&B AMP PRB: CPT | Performed by: EMERGENCY MEDICINE

## 2021-12-15 RX ORDER — NAPROXEN 500 MG/1
500 TABLET ORAL 2 TIMES DAILY WITH MEALS
Qty: 16 TABLET | Refills: 0 | Status: SHIPPED | OUTPATIENT
Start: 2021-12-15 | End: 2021-12-23

## 2021-12-15 RX ORDER — ACETAMINOPHEN 500 MG
1000 TABLET ORAL EVERY 6 HOURS PRN
Qty: 60 TABLET | Refills: 0 | Status: SHIPPED | OUTPATIENT
Start: 2021-12-15 | End: 2021-12-22

## 2021-12-15 RX ORDER — DIMENHYDRINATE 50 MG
50 TABLET,CHEWABLE ORAL EVERY 6 HOURS PRN
Qty: 10 TABLET | Refills: 0 | Status: SHIPPED | OUTPATIENT
Start: 2021-12-15

## 2021-12-15 RX ORDER — ONDANSETRON 8 MG/1
8 TABLET, ORALLY DISINTEGRATING ORAL EVERY 8 HOURS PRN
Qty: 12 TABLET | Refills: 0 | Status: SHIPPED | OUTPATIENT
Start: 2021-12-15 | End: 2023-04-18

## 2021-12-15 RX ADMIN — ACETAMINOPHEN 975 MG: 325 TABLET ORAL at 00:34

## 2021-12-15 ASSESSMENT — ENCOUNTER SYMPTOMS
HEMATURIA: 0
CHOKING: 0
SHORTNESS OF BREATH: 0
DIARRHEA: 0
FLANK PAIN: 1
VOMITING: 0
NAUSEA: 1

## 2021-12-15 NOTE — ED TRIAGE NOTES
Onset today right flank pain and abd pain. Endorses nausea without vomiting. Also has headache, lightheadedness and feels tired and generally weak. Rates pain 9/10. Denies bowel or urinary changes, chest pain, sob, or fevers.

## 2021-12-15 NOTE — ED PROVIDER NOTES
EMERGENCY DEPARTMENT ENCOUNTER      NAME: Renata Staton  AGE: 26 year old female  YOB: 1995  MRN: [id number]  EVALUATION DATE & TIME: No admission date for patient encounter.    PCP: Andria Hernández    ED PROVIDER: Deanne Bobby M.D.      Chief Complaint   Patient presents with     Flank Pain         FINAL IMPRESSION:  1. Upper respiratory tract infection, unspecified type    2. Flank pain    3. Calculus of kidney        MEDICAL DECISION MAKING:    Pertinent Labs & Imaging studies reviewed. (See chart for details)  ED Course as of 12/15/21 0225   Wed Dec 15, 2021   0108 Afebrile.  Vital signs here unremarkable.  Patient is coming in for evaluation of right-sided flank pain and generalized body aches, fatigue and nausea.  Started earlier today.  Pain is worse with movement, palpation.  No falls or trauma.  No known sick contacts.  No vaginal discharge.  No vomiting, diarrhea.  Fully vaccinated with booster against COVID-19.  No influenza vaccination yet this year.Physical exam for patient without any acute cardiopulmonary distress.  She has right-sided flank tenderness to palpation, no lower abdominal tenderness.  No right upper quadrant or epigastric tenderness.  She has slight right-sided CVA tenderness to percussion.  Remainder unremarkable.Patient likely could be coming down with viral illness.  Did check labs for patient here that are unremarkable.,  Urinalysis without blood, small amount of ketones.  CT scan abdomen pelvis dry patient with history of allergy shows renal stones but no active ureterolithiasis.  Given the back patient is pain here is reproducible, no evidence of UTI, Alex, could be some musculoskeletal element.  Also could be viral illness as well.  No shortness of breath, no concerning signs for PE.  Low risk by Wells, PERC negative.  Patient without chest pain or shortness of breath reported.We will discharge home with medications for pain control, nausea control.  Follow-up  with primary care in 2 days.  Did swab for influenza at discharge.  Will call if positive.           Critical care: 0 minutes excluding separately billable procedures.  Includes bedside management, time reviewing test results, review of records, discussing the case with staff, documenting the medical record and time spent with family members (or surrogate decision makers) discussing specific treatment issues.          ED COURSE:  12:49 AM I met with the patient for the initial interview and physical examination. Discussed plan for treatment and workup in the ED.   12:56 AM I rechecked the patient.      The importance of close follow up was discussed. We reviewed warning signs and symptoms, and I instructed Ms. Staton to return to the emergency department immediately if she develops any new or worsening symptoms. I provided additional verbal discharge instructions. Ms. Staton expressed understanding and agreement with this plan of care, her questions were answered, and she was discharged in stable condition.     PPE: Provider wore gloves, N95 mask, eye protection, surgical cap, and paper mask.  MEDICATIONS GIVEN IN THE EMERGENCY:  Medications   acetaminophen (TYLENOL) tablet 975 mg (975 mg Oral Given 12/15/21 0034)       NEW PRESCRIPTIONS STARTED AT TODAY'S ER VISIT:  Discharge Medication List as of 12/15/2021  1:40 AM      START taking these medications    Details   acetaminophen (TYLENOL) 500 MG tablet Take 2 tablets (1,000 mg) by mouth every 6 hours as needed for pain, Disp-60 tablet, R-0, Local Print      dimenhyDRINATE (DRAMAMINE) 50 MG CHEW Take 1 tablet (50 mg) by mouth every 6 hours as needed for other (nausea), Disp-10 tablet, R-0, Local Print      naproxen (NAPROSYN) 500 MG tablet Take 1 tablet (500 mg) by mouth 2 times daily (with meals) for 8 days, Disp-16 tablet, R-0, Local Print      ondansetron (ZOFRAN-ODT) 8 MG ODT tab Take 1 tablet (8 mg) by mouth every 8 hours as needed, Disp-12 tablet, R-0, Local  Print                =================================================================    HPI    Patient information was obtained from: Patient and triage note    Use of : N/A        Renata Staton is a 26 year old female with a pertinent medical history of pancreatitis, hypokalemia, kidney disease, and anxiety and depression who presents to the ED for evaluation of generalized weakness and flank pain.    Per triage note, starting today (12/14), the patient began to endorse right flank pain and abdominal pain. Rates pain 9/10.    For the past few days, the patient has experienced generalized body aches, as well as right sided flank pain that intensifies with movement. She endorses some nausea, but no vomiting. The patient is fully vaccinated and has received her booster. There are no known sick contacts. She denies taking any palliative medications at home prior to arrival. The patient denies cough, shortness of breath, hematuria, vaginal discharge, lower vaginal pain, diarrhea, or any other complaints at this time.    REVIEW OF SYSTEMS   Review of Systems   Constitutional:        Positive for generalized body aches.   Respiratory: Negative for choking and shortness of breath.    Gastrointestinal: Positive for nausea. Negative for diarrhea and vomiting.   Genitourinary: Positive for flank pain (right). Negative for hematuria.   All other systems reviewed and are negative.        PAST MEDICAL HISTORY:  Past Medical History:   Diagnosis Date     Anxiety      Chronic abdominal pain      Chronic pelvic pain in female      Depression      History of anesthesia complications     family issues slow to wake up after surgery     Migraine        PAST SURGICAL HISTORY:  Past Surgical History:   Procedure Laterality Date     ENDOSCOPIC RETROGRADE CHOLANGIOPANCREATOGRAM N/A 12/18/2019    Procedure: ENDOSCOPIC RETROGRADE CHOLANGIOPANCREATOGRAPHY with billary sphinctomy and stone extraction and  billary stent  placement with epinephrine injection ;  Surgeon: Dustin Betancourt MD;  Location: [facility] OR;  Service: Gastroenterology     ENDOSCOPIC RETROGRADE CHOLANGIOPANCREATOGRAM N/A 2/28/2020    Procedure: ENDOSCOPIC RETROGRADE CHOLANGIOPANCREATOGRAPHY, STENT REMOVAL, SLUDGE REMOVAL;  Surgeon: Dustin Betancourt MD;  Location: [facility] OR;  Service: Gastroenterology     LAPAROSCOPIC CHOLECYSTECTOMY N/A 12/11/2019    Procedure: CHOLECYSTECTOMY, LAPAROSCOPIC;  Surgeon: Samia Portillo MD;  Location: [facility] OR;  Service: General     AR LAP,DIAGNOSTIC ABDOMEN N/A 2/20/2018    Procedure: DIAGNOSTIC LAPAROSCOPY, LYSIS OF  ADHESIONS;  Surgeon: Manish Lyons MD;  Location: [facility] OR;  Service: Gynecology     XR ERCP BILIARY ONLY  12/18/2019     XR ERCP BILIARY ONLY  2/28/2020       CURRENT MEDICATIONS:    No current facility-administered medications for this encounter.    Current Outpatient Medications:      acetaminophen (TYLENOL) 500 MG tablet, Take 2 tablets (1,000 mg) by mouth every 6 hours as needed for pain, Disp: 60 tablet, Rfl: 0     dimenhyDRINATE (DRAMAMINE) 50 MG CHEW, Take 1 tablet (50 mg) by mouth every 6 hours as needed for other (nausea), Disp: 10 tablet, Rfl: 0     naproxen (NAPROSYN) 500 MG tablet, Take 1 tablet (500 mg) by mouth 2 times daily (with meals) for 8 days, Disp: 16 tablet, Rfl: 0     ondansetron (ZOFRAN-ODT) 8 MG ODT tab, Take 1 tablet (8 mg) by mouth every 8 hours as needed, Disp: 12 tablet, Rfl: 0     amoxicillin-clavulanate (AUGMENTIN) 875-125 MG per tablet, Take 1 tablet by mouth 2 times daily, Disp: 20 tablet, Rfl: 0    ALLERGIES:  Allergies   Allergen Reactions     Penicillin G Anaphylaxis     Sumatriptan Hives, Swelling and Anxiety     Went to ED after taking 25mg for first time, no objective findings, likely not true allergy     Augmentin Hives     Iohexol Hives and Itching     Pt broke out in hives after CT IV contrast administration, when returning  to the ER from [place].      Peanut Oil Unknown     Peanut [Peanut Oil] Swelling     Throat swelling       FAMILY HISTORY:  Family History   Problem Relation Age of Onset     Cancer Mother      Asthma Mother      Asthma Sister      Mental Illness Sister      Heart Disease Sister        SOCIAL HISTORY:   Social History     Socioeconomic History     Marital status: Single     Spouse name: Not on file     Number of children: Not on file     Years of education: Not on file     Highest education level: Not on file   Occupational History     Not on file   Tobacco Use     Smoking status: Current Every Day Smoker     Packs/day: 0.50     Smokeless tobacco: Never Used   Substance and Sexual Activity     Alcohol use: No     Comment: Alcoholic Drinks/day: rare     Drug use: Not Currently     Sexual activity: Yes     Partners: Male   Other Topics Concern     Not on file   Social History Narrative     Not on file     Social Determinants of Health     Financial Resource Strain: Not on file   Food Insecurity: Not on file   Transportation Needs: Not on file   Physical Activity: Not on file   Stress: Not on file   Social Connections: Not on file   Intimate Partner Violence: Not on file   Housing Stability: Not on file       PHYSICAL EXAM:    Vitals: /81   Pulse 96   Temp 98.9  F (37.2  C) (Oral)   Resp 20   Wt 59 kg (130 lb)   LMP 12/09/2021   SpO2 98%   BMI 21.63 kg/m     General:. Alert and interactive, comfortable appearing.  HENT: Oropharynx without erythema or exudates. MMM.  TMs clear bilaterally.  Eyes: Pupils mid-sized and equally reactive.   Neck: Full AROM.  No midline tenderness to palpation.  Cardiovascular: Regular rate and rhythm. Peripheral pulses 2+ bilaterally. No acute cardiopulmonary distress.  Chest/Pulmonary: Normal work of breathing. Lung sounds clear and equal throughout, no wheezes or crackles. No chest wall tenderness or deformities.  Abdomen: Soft, nondistended. without guarding or rebound.  Right-sided flank tenderness to palpation, no lower abdominal tenderness. No right upper quadrant or epigastric tenderness.  Back/Spine: Slight right-sided CVA tenderness to percussion. No midline tenderness.  Extremities: Normal ROM of all major joints. No lower extremity edema.   Skin: Warm and dry. Normal skin color.   Neuro: Speech clear. CNs grossly intact. Moves all extremities appropriately. Strength and sensation grossly intact to all extremities.   Psych: Normal affect/mood, cooperative, memory appropriate.     LAB:  All pertinent labs reviewed and interpreted.  Labs Ordered and Resulted from Time of ED Arrival to Time of ED Departure   ROUTINE UA WITH MICROSCOPIC REFLEX TO CULTURE - Abnormal       Result Value    Color Urine Colorless      Appearance Urine Clear      Glucose Urine Negative      Bilirubin Urine Negative      Ketones Urine 10  (*)     Specific Gravity Urine 1.007      Blood Urine Negative      pH Urine 5.5      Protein Albumin Urine Negative      Urobilinogen Urine <2.0      Nitrite Urine Negative      Leukocyte Esterase Urine Negative      Bacteria Urine None Seen      RBC Urine 0      WBC Urine <1      Squamous Epithelials Urine 1     BASIC METABOLIC PANEL - Abnormal    Sodium 137      Potassium 3.6      Chloride 105      Carbon Dioxide (CO2) 21 (*)     Anion Gap 11      Urea Nitrogen 8      Creatinine 0.75      Calcium 9.3      Glucose 97      GFR Estimate >90     HEPATIC FUNCTION PANEL - Normal    Bilirubin Total 0.4      Bilirubin Direct 0.2      Protein Total 7.6      Albumin 4.4      Alkaline Phosphatase 95      AST 20      ALT 18     LIPASE - Normal    Lipase 20     LACTIC ACID WHOLE BLOOD - Normal    Lactic Acid 0.9     HCG QUALITATIVE URINE POCT - Normal    HCG Qual Urine Negative      Internal QC Check POCT Valid      POCT Kit Lot Number [id number]      POCT Kit Expiration Date 2023-03-31     CBC WITH PLATELETS AND DIFFERENTIAL    WBC Count 9.0      RBC Count 4.30      Hemoglobin  12.1      Hematocrit 36.0      MCV 84      MCH 28.1      MCHC 33.6      RDW 12.8      Platelet Count 342      % Neutrophils 51      % Lymphocytes 40      % Monocytes 7      % Eosinophils 1      % Basophils 1      % Immature Granulocytes 0      NRBCs per 100 WBC 0      Absolute Neutrophils 4.7      Absolute Lymphocytes 3.5      Absolute Monocytes 0.6      Absolute Eosinophils 0.1      Absolute Basophils 0.1      Absolute Immature Granulocytes 0.0      Absolute NRBCs 0.0     INFLUENZA A/B & SARS-COV2 PCR MULTIPLEX       RADIOLOGY:  CT Abdomen Pelvis w/o Contrast   Final Result   IMPRESSION:    1.  Single small left renal stone. No ureteral stone or hydronephrosis. No acute abnormality.               I, Ashleigh Sarkar, am serving as a scribe to document services personally performed by Dr. Deanne Bobby  based on my observation and the provider's statements to me. I, Deanne Bobby MD attest that Ashleigh Sarkar is acting in a scribe capacity, has observed my performance of the services and has documented them in accordance with my direction.      Deanne Bobby M.D.  Emergency Medicine  [facility] EMERGENCY DEPARTMENT  [address] [place] [id number] [phone]  Dept: [phone]       Halima Bobby MD  12/15/21 3780

## 2021-12-15 NOTE — DISCHARGE INSTRUCTIONS
Take medications as needed for pain discomfort.  We did swab you for Covid as well as influenza.  We will call you if those results come back positive.  Otherwise rest, plenty of fluids and follow-up with primary care in 2 days if no improvement.

## 2022-01-18 VITALS — WEIGHT: 144.62 LBS | BODY MASS INDEX: 24.82 KG/M2

## 2022-01-18 VITALS
HEART RATE: 81 BPM | OXYGEN SATURATION: 98 % | WEIGHT: 144 LBS | DIASTOLIC BLOOD PRESSURE: 70 MMHG | SYSTOLIC BLOOD PRESSURE: 100 MMHG | HEIGHT: 64 IN | BODY MASS INDEX: 24.59 KG/M2

## 2022-03-24 ENCOUNTER — HOSPITAL ENCOUNTER (EMERGENCY)
Facility: HOSPITAL | Age: 27
Discharge: HOME | End: 2022-03-24
Attending: EMERGENCY MEDICINE | Admitting: EMERGENCY MEDICINE
Payer: COMMERCIAL

## 2022-03-24 ENCOUNTER — APPOINTMENT (OUTPATIENT)
Dept: CT IMAGING | Facility: HOSPITAL | Age: 27
End: 2022-03-24
Attending: STUDENT IN AN ORGANIZED HEALTH CARE EDUCATION/TRAINING PROGRAM
Payer: COMMERCIAL

## 2022-03-24 VITALS
OXYGEN SATURATION: 100 % | DIASTOLIC BLOOD PRESSURE: 61 MMHG | BODY MASS INDEX: 19.8 KG/M2 | SYSTOLIC BLOOD PRESSURE: 101 MMHG | WEIGHT: 119 LBS | RESPIRATION RATE: 18 BRPM | TEMPERATURE: 97.9 F | HEART RATE: 76 BPM

## 2022-03-24 DIAGNOSIS — R10.84 DIFFUSE ABDOMINAL PAIN: ICD-10-CM

## 2022-03-24 DIAGNOSIS — R42 DIZZINESS: ICD-10-CM

## 2022-03-24 DIAGNOSIS — R11.0 NAUSEA: ICD-10-CM

## 2022-03-24 DIAGNOSIS — D64.9 ANEMIA, UNSPECIFIED TYPE: ICD-10-CM

## 2022-03-24 LAB
ALBUMIN SERPL-MCNC: 4.2 G/DL (ref 3.5–5)
ALP SERPL-CCNC: 100 U/L (ref 45–120)
ALT SERPL W P-5'-P-CCNC: <9 U/L (ref 0–45)
ANION GAP SERPL CALCULATED.3IONS-SCNC: 9 MMOL/L (ref 5–18)
AST SERPL W P-5'-P-CCNC: 14 U/L (ref 0–40)
BASOPHILS # BLD AUTO: 0 10E3/UL (ref 0–0.2)
BASOPHILS NFR BLD AUTO: 1 %
BILIRUB SERPL-MCNC: 0.2 MG/DL (ref 0–1)
BUN SERPL-MCNC: 7 MG/DL (ref 8–22)
CALCIUM SERPL-MCNC: 8.8 MG/DL (ref 8.5–10.5)
CHLORIDE BLD-SCNC: 106 MMOL/L (ref 98–107)
CO2 SERPL-SCNC: 23 MMOL/L (ref 22–31)
CREAT SERPL-MCNC: 0.73 MG/DL (ref 0.6–1.1)
EOSINOPHIL # BLD AUTO: 0.1 10E3/UL (ref 0–0.7)
EOSINOPHIL NFR BLD AUTO: 1 %
ERYTHROCYTE [DISTWIDTH] IN BLOOD BY AUTOMATED COUNT: 14.6 % (ref 10–15)
GFR SERPL CREATININE-BSD FRML MDRD: >90 ML/MIN/1.73M2
GLUCOSE BLD-MCNC: 83 MG/DL (ref 70–125)
HCG UR QL: NEGATIVE
HCT VFR BLD AUTO: 34.4 % (ref 35–47)
HGB BLD-MCNC: 10.6 G/DL (ref 11.7–15.7)
IMM GRANULOCYTES # BLD: 0 10E3/UL
IMM GRANULOCYTES NFR BLD: 0 %
LIPASE SERPL-CCNC: 19 U/L (ref 0–52)
LYMPHOCYTES # BLD AUTO: 2.3 10E3/UL (ref 0.8–5.3)
LYMPHOCYTES NFR BLD AUTO: 37 %
MAGNESIUM SERPL-MCNC: 2.1 MG/DL (ref 1.8–2.6)
MCH RBC QN AUTO: 25.4 PG (ref 26.5–33)
MCHC RBC AUTO-ENTMCNC: 30.8 G/DL (ref 31.5–36.5)
MCV RBC AUTO: 82 FL (ref 78–100)
MONOCYTES # BLD AUTO: 0.4 10E3/UL (ref 0–1.3)
MONOCYTES NFR BLD AUTO: 6 %
NEUTROPHILS # BLD AUTO: 3.6 10E3/UL (ref 1.6–8.3)
NEUTROPHILS NFR BLD AUTO: 55 %
NRBC # BLD AUTO: 0 10E3/UL
NRBC BLD AUTO-RTO: 0 /100
PLATELET # BLD AUTO: 349 10E3/UL (ref 150–450)
POTASSIUM BLD-SCNC: 3.8 MMOL/L (ref 3.5–5)
PROT SERPL-MCNC: 7.6 G/DL (ref 6–8)
RBC # BLD AUTO: 4.18 10E6/UL (ref 3.8–5.2)
SODIUM SERPL-SCNC: 138 MMOL/L (ref 136–145)
TROPONIN I SERPL-MCNC: <0.01 NG/ML (ref 0–0.29)
TSH SERPL DL<=0.005 MIU/L-ACNC: 1.09 UIU/ML (ref 0.3–5)
WBC # BLD AUTO: 6.4 10E3/UL (ref 4–11)

## 2022-03-24 PROCEDURE — 36415 COLL VENOUS BLD VENIPUNCTURE: CPT | Performed by: EMERGENCY MEDICINE

## 2022-03-24 PROCEDURE — 258N000003 HC RX IP 258 OP 636: Performed by: EMERGENCY MEDICINE

## 2022-03-24 PROCEDURE — 84484 ASSAY OF TROPONIN QUANT: CPT | Performed by: STUDENT IN AN ORGANIZED HEALTH CARE EDUCATION/TRAINING PROGRAM

## 2022-03-24 PROCEDURE — 84443 ASSAY THYROID STIM HORMONE: CPT | Performed by: EMERGENCY MEDICINE

## 2022-03-24 PROCEDURE — 99285 EMERGENCY DEPT VISIT HI MDM: CPT | Mod: 25

## 2022-03-24 PROCEDURE — 93005 ELECTROCARDIOGRAM TRACING: CPT | Performed by: EMERGENCY MEDICINE

## 2022-03-24 PROCEDURE — 83735 ASSAY OF MAGNESIUM: CPT | Performed by: EMERGENCY MEDICINE

## 2022-03-24 PROCEDURE — 83690 ASSAY OF LIPASE: CPT | Performed by: STUDENT IN AN ORGANIZED HEALTH CARE EDUCATION/TRAINING PROGRAM

## 2022-03-24 PROCEDURE — 81025 URINE PREGNANCY TEST: CPT | Performed by: STUDENT IN AN ORGANIZED HEALTH CARE EDUCATION/TRAINING PROGRAM

## 2022-03-24 PROCEDURE — 250N000011 HC RX IP 250 OP 636: Performed by: EMERGENCY MEDICINE

## 2022-03-24 PROCEDURE — 85025 COMPLETE CBC W/AUTO DIFF WBC: CPT | Performed by: STUDENT IN AN ORGANIZED HEALTH CARE EDUCATION/TRAINING PROGRAM

## 2022-03-24 PROCEDURE — 96374 THER/PROPH/DIAG INJ IV PUSH: CPT

## 2022-03-24 PROCEDURE — 74176 CT ABD & PELVIS W/O CONTRAST: CPT

## 2022-03-24 PROCEDURE — 96361 HYDRATE IV INFUSION ADD-ON: CPT

## 2022-03-24 PROCEDURE — 80053 COMPREHEN METABOLIC PANEL: CPT | Performed by: STUDENT IN AN ORGANIZED HEALTH CARE EDUCATION/TRAINING PROGRAM

## 2022-03-24 RX ORDER — ONDANSETRON 2 MG/ML
4 INJECTION INTRAMUSCULAR; INTRAVENOUS ONCE
Status: DISCONTINUED | OUTPATIENT
Start: 2022-03-24 | End: 2022-03-25 | Stop reason: HOSPADM

## 2022-03-24 RX ORDER — KETOROLAC TROMETHAMINE 30 MG/ML
15 INJECTION, SOLUTION INTRAMUSCULAR; INTRAVENOUS ONCE
Status: COMPLETED | OUTPATIENT
Start: 2022-03-24 | End: 2022-03-24

## 2022-03-24 RX ADMIN — KETOROLAC TROMETHAMINE 15 MG: 30 INJECTION, SOLUTION INTRAMUSCULAR at 20:32

## 2022-03-24 RX ADMIN — SODIUM CHLORIDE 1000 ML: 9 INJECTION, SOLUTION INTRAVENOUS at 20:26

## 2022-03-24 ASSESSMENT — ENCOUNTER SYMPTOMS
COUGH: 0
SHORTNESS OF BREATH: 0
VOMITING: 1
FEVER: 0
ABDOMINAL PAIN: 1
DYSURIA: 0
DIARRHEA: 1
NAUSEA: 1

## 2022-03-24 NOTE — ED PROVIDER NOTES
"ED Provider In Triage Note  [facility]  Encounter Date: Mar 24, 2022    No chief complaint on file.      Brief HPI:   Renata Staton is a 26 year old female presenting to the Emergency Department with a chief complaint of \"I always feel like this but today is worse.\"    Rib pain. Lost weight \"I keep going to doctors and they dont know what to do.\" upper abd pain        Brief Physical Exam:  There were no vitals taken for this visit.  General: Non-toxic appearing  HEENT: Atraumatic  Resp: No respiratory distress  Abdomen: Non-peritoneal  Neuro: Alert, oriented, answers questions appropriately  Psych: Behavior appropriate            Plan Initiated in Triage:  No orders of the defined types were placed in this encounter.      PIT Dispo:   Return to lobby while awaiting workup and ED bed availability    Clint Colon DO on 3/24/2022 at 6:20 PM    Patient was evaluated by the Physician in Triage due to a limitation of available rooms in the Emergency Department. A plan of care was discussed based on the information obtained on the initial evaluation and patient was consuled to return back to the Emergency Department lobby after this initial evalutaiton until results were obtained or a room became available in the Emergency Department. Patient was counseled not to leave prior to receiving the results of their workup.      Clint Colon DO  03/24/22 1850    "

## 2022-03-24 NOTE — ED TRIAGE NOTES
Pt c/o right and left-sided abdominal pain for past 2 days.  Pt also c/o intermittent nausea, vomiting, dizziness, and lightheadedness for past year.  Pt also states she's had an unexplained weight loss of 40 lbs over past 1-2 years.  Pt last took Ibuprofen this morning.

## 2022-03-25 NOTE — ED PROVIDER NOTES
EMERGENCY DEPARTMENT ENCOUNTER      NAME: Renata Staton  AGE: 26 year old female  YOB: 1995  MRN: [id number]  EVALUATION DATE & TIME: 3/24/2022  7:29 PM    PCP: Andria Hernández    ED PROVIDER: Yudi Mac M.D.        Chief Complaint   Patient presents with     Abdominal Pain     Nausea & Vomiting     Dizziness         FINAL IMPRESSION:    1. Diffuse abdominal pain    2. Dizziness    3. Nausea    4. Anemia, unspecified type            MEDICAL DECISION MAKING:    Renata Staton is a 26 year old female with history of recurrent gallstone pancreatitis, s/p cholecystectomy (2019), ERCP (2019, 2020) who presents to the ER with complaints of abdominal pain, vomiting, dizziness and near syncope.  Also having bilateral abdominal pain.  Signs of a lot of these issues are not necessarily acute per se, just had a worsened episode today.  Orthostatics were slightly positive.  IV fluids given.  Basic laboratories unremarkable except for hemoglobin now down to 10.6 compared to 12 a few months ago.  MCV normal but will try a small course of iron to see if this helps.  We will have her follow-up closely with primary care doctor.  She reports episodes of near syncope as well.  Certainly could just be orthostatic related, but has been going on now for couple of years.  We will have her follow-up with cardiology as patient does not feel that her primary care is continuing to look into the cause of her symptoms.  I do not feel she requires any emergent Holter monitor here the ER is no cardiac dysrhythmias have been witnessed.  I will defer this to cardiology as they feel necessary.        ED COURSE:  7:46 PM  I met with the patient to gather history and perform my exam. ED course and treatment discussed.    11:03 PM  I rechecked and updated the patient on results. Discussed plans for discharge.  Orthostatics were slightly positive.  She is now slightly anemic.  MCV normal but will try a short course of  iron.  We will have her continue to follow up with primary care but at this point she felt that primary care really was not trying to advance the cause of her symptoms.  We will have her follow-up with cardiology given her dizziness and episodes of near syncope.  Encourage patient to increase her oral intake as well to see if this helps.  Work-up otherwise unremarkable with negative CT scan.  Laboratories look good other than the hemoglobin of 10.6.      I do not think that this represents ACS, PE, ruptured AAA, aortic dissection, bowel obstruction, bowel ischemia, cholecystitis, pancreatitis, appendicitis, diverticulitis, kidney stone, pyelonephritis, incarcerated or strangulated hernia, ovarian torsion, PID, ectopic pregnancy, tubo-ovarian abscess, viscus perforation, perforated GI ulcer, or other such etiologies at this time.    This does not sound neurologic in nature.  Cardiac dysrhythmia is seen here in the ER.      COVID-19 PPE worn during patient evaluation:  Mask: n95 and homemade masks  Eye Protection: goggles   Gown: none  Hair cover: yes  Face shield: none  Patient wearing a mask: yes    At the conclusion of the encounter I discussed the results of all of the tests and the disposition. Their questions were answered. The patient (and any family present) acknowledged understanding and were agreeable with the care plan.      CONSULTANTS:  Referral to Cardiology Rapid Access        MEDICATIONS GIVEN IN THE EMERGENCY:  Medications   ondansetron (ZOFRAN) injection 4 mg (has no administration in time range)   0.9% sodium chloride BOLUS (0 mLs Intravenous Stopped 3/24/22 2120)   ketorolac (TORADOL) injection 15 mg (15 mg Intravenous Given 3/24/22 2032)             NEW PRESCRIPTIONS STARTED AT TODAY'S ER VISIT     Medication List      Started    ferrous sulfate 142 (45 Fe) MG CR tablet  Commonly known as: SLO-FE  142 mg, Oral, DAILY                CONDITION:  stable        DISPOSITION:  discharge home          =================================================================  =================================================================    HPI    Patient information was obtained from: Patient    Use of Intrepreter: N/A     Renata Staton is a 26 year old female with history of recurrent gallstone pancreatitis, s/p cholecystectomy (2019), ERCP (2019, 2020) who presents to the ER with complaints of abdominal pain, vomiting. Patient reports developing bilateral abdominal pain with associated nausea, vomiting since 03/22 (2 days ago) which has persisted since then.    Patient also reports she always has lightheadedness, nausea, vomiting for the past 1-2 years, but states these symptoms are more severe today. She states she felt like she was going to pass out earlier today and need to lay down. She has seen her PCP for these symptoms in the past with no clear cause found. She also states 40 lbs of unintentional weight loss over the last couple of years. She has seen her PCP for this issue as well and states they took labs. She otherwise states she has been eating appropriately.    No other symptoms reported including fever, cough, chest pain, or changes in urinary habits. She endorses chronic diarrhea from her cholecystectomy which has not changed recently.      REVIEW OF SYSTEMS  Review of Systems   Constitutional: Negative for fever.   Respiratory: Negative for cough and shortness of breath.    Cardiovascular: Negative for chest pain.   Gastrointestinal: Positive for abdominal pain, diarrhea, nausea and vomiting.   Genitourinary: Negative for dysuria.   Allergic/Immunologic: Negative for immunocompromised state.   All other systems reviewed and are negative.        PAST MEDICAL HISTORY:  Past Medical History:   Diagnosis Date     Anxiety      Chronic abdominal pain      Chronic pelvic pain in female      Depression      History of anesthesia complications     family issues slow to wake up after surgery      Migraine          PAST SURGICAL HISTORY:  Past Surgical History:   Procedure Laterality Date     ENDOSCOPIC RETROGRADE CHOLANGIOPANCREATOGRAM N/A 12/18/2019    Procedure: ENDOSCOPIC RETROGRADE CHOLANGIOPANCREATOGRAPHY with billary sphinctomy and stone extraction and  billary stent placement with epinephrine injection ;  Surgeon: Dustin Betancourt MD;  Location: [facility] [place];  Service: Gastroenterology     ENDOSCOPIC RETROGRADE CHOLANGIOPANCREATOGRAM N/A 2/28/2020    Procedure: ENDOSCOPIC RETROGRADE CHOLANGIOPANCREATOGRAPHY, STENT REMOVAL, SLUDGE REMOVAL;  Surgeon: Dustin Betancourt MD;  Location: [facility] [place];  Service: Gastroenterology     LAPAROSCOPIC CHOLECYSTECTOMY N/A 12/11/2019    Procedure: CHOLECYSTECTOMY, LAPAROSCOPIC;  Surgeon: Samia Portillo MD;  Location: [facility] [place];  Service: General     [place] LAP,DIAGNOSTIC ABDOMEN N/A 2/20/2018    Procedure: DIAGNOSTIC LAPAROSCOPY, LYSIS OF  ADHESIONS;  Surgeon: Manish Lyons MD;  Location: [facility];  Service: Gynecology     XR ERCP BILIARY ONLY  12/18/2019     XR ERCP BILIARY ONLY  2/28/2020         CURRENT MEDICATIONS:    Prior to Admission medications    Medication Sig Start Date End Date Taking? Authorizing Provider   amoxicillin-clavulanate (AUGMENTIN) 875-125 MG per tablet Take 1 tablet by mouth 2 times daily 1/26/14   Trinh Herrmann PA-C   dimenhyDRINATE (DRAMAMINE) 50 MG CHEW Take 1 tablet (50 mg) by mouth every 6 hours as needed for other (nausea) 12/15/21   Halima Bobby MD   ondansetron (ZOFRAN-ODT) 8 MG ODT tab Take 1 tablet (8 mg) by mouth every 8 hours as needed 12/15/21   Halima Bobby MD         ALLERGIES:  Allergies   Allergen Reactions     Penicillin G Anaphylaxis     Sumatriptan Hives, Swelling and Anxiety     Went to ED after taking 25mg for first time, no objective findings, likely not true allergy     Augmentin Hives     Iohexol Hives and Itching     Pt broke out in hives after CT IV contrast  administration, when returning to the ER from CT.      Peanut Oil Unknown     Peanut [Peanut Oil] Swelling     Throat swelling         FAMILY HISTORY:  Family History   Problem Relation Age of Onset     Cancer Mother      Asthma Mother      Asthma Sister      Mental Illness Sister      Heart Disease Sister          SOCIAL HISTORY:  Social History     Socioeconomic History     Marital status: Single     Spouse name: None     Number of children: None     Years of education: None     Highest education level: None   Occupational History     None   Tobacco Use     Smoking status: Current Every Day Smoker     Packs/day: 0.50     Smokeless tobacco: Never Used   Substance and Sexual Activity     Alcohol use: No     Comment: Alcoholic Drinks/day: rare     Drug use: Not Currently     Sexual activity: Yes     Partners: Male   Other Topics Concern     None   Social History Narrative     None     Social Determinants of Health     Financial Resource Strain: Not on file   Food Insecurity: Not on file   Transportation Needs: Not on file   Physical Activity: Not on file   Stress: Not on file   Social Connections: Not on file   Intimate Partner Violence: Not on file   Housing Stability: Not on file         VITALS:  Patient Vitals for the past 24 hrs:   BP Temp Temp src Pulse Resp SpO2 Weight   03/24/22 2105 -- -- -- 76 18 100 % --   03/24/22 2100 101/61 -- -- 62 19 100 % --   03/24/22 2000 105/61 -- -- 79 19 99 % --   03/24/22 1826 120/77 97.9  F (36.6  C) Oral 89 16 100 % 54 kg (119 lb)       Wt Readings from Last 3 Encounters:   03/24/22 54 kg (119 lb)   12/14/21 59 kg (130 lb)   11/17/20 59.9 kg (132 lb)         PHYSICAL EXAM    Constitutional:  Well developed, Well nourished, NAD, GCS 15  HENT:  Normocephalic, Atraumatic, Bilateral external ears normal, Nose normal. Neck-  Normal range of motion, No tenderness, Supple, No stridor.   Eyes:  PERRL, EOMI, Conjunctiva normal, No discharge.  Respiratory:  Normal breath sounds, No  respiratory distress, No wheezing, Speaks full sentences easily. No cough.  Cardiovascular:  Normal heart rate, Regular rhythm, No murmurs, No rubs, No gallops. Chest wall nontender.  GI:  No excessive obesity.  Bowel sounds normal, Soft, mild left mid and right mid abd tenderness, No masses, No flank tenderness. No rebound or guarding.  : deferred  Musculoskeletal:  No cyanosis, No clubbing. Good range of motion in all major joints. No major deformities noted.   Integument:  Warm, Dry, No erythema, No rash.  No petechiae.   Neurologic:  Alert & oriented x 3, No focal deficits noted.   Psychiatric:  Affect normal, Judgment normal, Mood normal. Cooperative          LAB:  All pertinent labs reviewed and interpreted.  Recent Results (from the past 24 hour(s))   HCG qualitative urine (UPT)    Collection Time: 03/24/22  6:38 PM   Result Value Ref Range    hCG Urine Qualitative Negative Negative   Comprehensive metabolic panel    Collection Time: 03/24/22  8:17 PM   Result Value Ref Range    Sodium 138 136 - 145 mmol/L    Potassium 3.8 3.5 - 5.0 mmol/L    Chloride 106 98 - 107 mmol/L    Carbon Dioxide (CO2) 23 22 - 31 mmol/L    Anion Gap 9 5 - 18 mmol/L    Urea Nitrogen 7 (L) 8 - 22 mg/dL    Creatinine 0.73 0.60 - 1.10 mg/dL    Calcium 8.8 8.5 - 10.5 mg/dL    Glucose 83 70 - 125 mg/dL    Alkaline Phosphatase 100 45 - 120 U/L    AST 14 0 - 40 U/L    ALT <9 0 - 45 U/L    Protein Total 7.6 6.0 - 8.0 g/dL    Albumin 4.2 3.5 - 5.0 g/dL    Bilirubin Total 0.2 0.0 - 1.0 mg/dL    GFR Estimate >90 >60 mL/min/1.73m2   Lipase    Collection Time: 03/24/22  8:17 PM   Result Value Ref Range    Lipase 19 0 - 52 U/L   Troponin I    Collection Time: 03/24/22  8:17 PM   Result Value Ref Range    Troponin I <0.01 0.00 - 0.29 ng/mL   Magnesium    Collection Time: 03/24/22  8:17 PM   Result Value Ref Range    Magnesium 2.1 1.8 - 2.6 mg/dL   TSH with free T4 reflex    Collection Time: 03/24/22  8:17 PM   Result Value Ref Range    TSH 1.09  0.30 - 5.00 uIU/mL   CBC with platelets and differential    Collection Time: 03/24/22  8:17 PM   Result Value Ref Range    WBC Count 6.4 4.0 - 11.0 10e3/uL    RBC Count 4.18 3.80 - 5.20 10e6/uL    Hemoglobin 10.6 (L) 11.7 - 15.7 g/dL    Hematocrit 34.4 (L) 35.0 - 47.0 %    MCV 82 78 - 100 fL    MCH 25.4 (L) 26.5 - 33.0 pg    MCHC 30.8 (L) 31.5 - 36.5 g/dL    RDW 14.6 10.0 - 15.0 %    Platelet Count 349 150 - 450 10e3/uL    % Neutrophils 55 %    % Lymphocytes 37 %    % Monocytes 6 %    % Eosinophils 1 %    % Basophils 1 %    % Immature Granulocytes 0 %    NRBCs per 100 WBC 0 <1 /100    Absolute Neutrophils 3.6 1.6 - 8.3 10e3/uL    Absolute Lymphocytes 2.3 0.8 - 5.3 10e3/uL    Absolute Monocytes 0.4 0.0 - 1.3 10e3/uL    Absolute Eosinophils 0.1 0.0 - 0.7 10e3/uL    Absolute Basophils 0.0 0.0 - 0.2 10e3/uL    Absolute Immature Granulocytes 0.0 <=0.4 10e3/uL    Absolute NRBCs 0.0 10e3/uL       Lab Results   Component Value Date    ABORH A POS 02/20/2018           RADIOLOGY:  Reviewed all pertinent imaging. Please see official radiology report.    CT Abdomen Pelvis w/o Contrast   Final Result   IMPRESSION:    1.  No etiology for symptoms. Stable nonobstructing left upper pole stone. Exam otherwise negative.               EKG:    Indication: dizziness    Performed at: 19:49p  Impression: Sinus rhythm at 71 bpm.  Flipped T waves noted in lead aVR, aVL, and V1-V2.  OH interval 120 ms, QRS 72 ms,  ms.  Nonspecific ST changes compared to December 10, 2019.      I have independently reviewed and interpreted the EKG(s) documented above.        PROCEDURES:  none      I, Nicola Mauro, am serving as a scribe to document services personally performed by Dr. Yudi Mac based on my observation and the provider's statements to me. I, Dr. Yudi Mac MD attest that Nicola Mauro is acting in a scribe capacity, has observed my performance of the services and has documented them in accordance  with my direction.        Yudi Mac M.D. [facility]  Emergency Medicine and Medical Toxicology  [facility] EMERGENCY DEPARTMENT  [facility] [facility] [id number] [phone]  Dept: [phone]           Yudi Mac MD  03/24/22 [id number]

## 2022-03-25 NOTE — DISCHARGE INSTRUCTIONS
You are anemic. You can try the iron pills to see if this helps.    Your blood tests also suggest may be a little bit of dehydration.  Be sure to increase the amount of fluids that you take them.    Make an appointment to follow-up with primary care doctor for this next week.  Also consider seeing a cardiologist for your dizziness episodes.    Return the emergency department with worse dizziness, passout, worsening abdominal pain, or any other concerns.    Thank you for choosing [facility] Emergency Department.  It has been my pleasure caring for you today.     ~Dr. Bubba MD

## 2022-03-29 LAB
ATRIAL RATE - MUSE: 71 BPM
DIASTOLIC BLOOD PRESSURE - MUSE: NORMAL MMHG
INTERPRETATION ECG - MUSE: NORMAL
P AXIS - MUSE: 67 DEGREES
PR INTERVAL - MUSE: 120 MS
QRS DURATION - MUSE: 72 MS
QT - MUSE: 380 MS
QTC - MUSE: 412 MS
R AXIS - MUSE: 85 DEGREES
SYSTOLIC BLOOD PRESSURE - MUSE: NORMAL MMHG
T AXIS - MUSE: 69 DEGREES
VENTRICULAR RATE- MUSE: 71 BPM

## 2022-04-05 ENCOUNTER — OFFICE VISIT (OUTPATIENT)
Dept: CARDIOLOGY | Facility: CLINIC | Age: 27
End: 2022-04-05
Attending: EMERGENCY MEDICINE
Payer: COMMERCIAL

## 2022-04-05 VITALS
SYSTOLIC BLOOD PRESSURE: 102 MMHG | HEART RATE: 104 BPM | WEIGHT: 121.2 LBS | RESPIRATION RATE: 16 BRPM | DIASTOLIC BLOOD PRESSURE: 62 MMHG | HEIGHT: 65 IN | BODY MASS INDEX: 20.19 KG/M2

## 2022-04-05 DIAGNOSIS — R42 DIZZINESS: ICD-10-CM

## 2022-04-05 PROCEDURE — 99204 OFFICE O/P NEW MOD 45 MIN: CPT | Performed by: INTERNAL MEDICINE

## 2022-04-05 RX ORDER — LANOLIN ALCOHOL/MO/W.PET/CERES
1000 CREAM (GRAM) TOPICAL DAILY
COMMUNITY
Start: 2021-08-31 | End: 2023-10-19

## 2022-04-05 NOTE — PATIENT INSTRUCTIONS
1. Work to keep yourself hydrated.  Drink extra fluids, salt containing fluids, and nutritional fluids as these may help to avoid the lightheadedness and dizziness you feel with standing.  2. Check in with your primary care doctor to see if a gastroenterology evaluation would be helpful at finding a cause of your persistent nausea  3. Continue your efforts to get off of cigarettes daily.  Nicotine patches are reasonable option.

## 2022-04-05 NOTE — PROGRESS NOTES
CARDIOLOGY RAPID ACCESS CLINIC CONSULT NOTE     Assessment/Plan:   1.  Orthostatic lightheadedness.  This is associated with hypotension, and I suspect related to hypovolemia.  Increased oral intake was advocated along with nutrition today containing fluids to help maintain her weight.  Anemia is mild.  Does not appear to be a cardiac cause of the symptoms  2.  Atypical chest pain, likely noncardiac in etiology.  ECG normal.  3.  Anorexia, weight loss, nausea.  Suggested follow-up with her primary care provider or GI evaluation.    Follow-up as needed     History of Present Illness:     It is my pleasure to see Renata Staton at the [facility] RAPID ACCESS clinic for evaluation of lightheadedness, near syncope.    Renata Staton is a 26 year old female with a past medical history of gallstone pancreatitis, with recurrent and persistent nausea and vomiting.  She has documented 13 pound weight loss in the last 17 months, and reports a 40 pound weight loss over the last 2 years.  She reports chronic nausea vomiting abdominal pain and dizziness have been a daily occurrence decreasing her appetite.  More recently she has been experiencing 2 months of lightheadedness and near syncope which does not seem to be better with food intake.  She has felt weak and fallen after seeing spots upon standing.  She has not lost consciousness.  She has no associated chest pain.  She has had some bouts of chest pain while recumbent, associated with nausea.  This is not particularly worsened with activity.    She still smokes half pack of cigarettes daily, but has nicotine patches which she is looking for clearance to start using.  She reports half a cup of decaf and an egg as her intake so far today.  She does report having lasagna last night.  She reports finances are not an issue.    Past Medical History:     Patient Active Problem List   Diagnosis     Chronic pelvic pain in female     Tobacco use in  pregnancy     Anxiety     Single liveborn infant delivered vaginally     Vaginal infection     Acute pancreatitis without infection or necrosis     Acute gallstone pancreatitis     Hypokalemia     Elevated AST (SGOT)     Acute pancreatitis, unspecified complication status, unspecified pancreatitis type     Cholelithiasis and acute cholecystitis with obstruction     Urinary retention     Pancreatitis, recurrent     Smoker     Migraine     Kidney disease     Chronic daily headache     Anxiety and depression     B12 deficiency       Past Surgical History:     Past Surgical History:   Procedure Laterality Date     ENDOSCOPIC RETROGRADE CHOLANGIOPANCREATOGRAM N/A 12/18/2019    Procedure: ENDOSCOPIC RETROGRADE CHOLANGIOPANCREATOGRAPHY with billary sphinctomy and stone extraction and  billary stent placement with epinephrine injection ;  Surgeon: Dustin Betancourt MD;  Location: [facility] [place];  Service: Gastroenterology     ENDOSCOPIC RETROGRADE CHOLANGIOPANCREATOGRAM N/A 2/28/2020    Procedure: ENDOSCOPIC RETROGRADE CHOLANGIOPANCREATOGRAPHY, STENT REMOVAL, SLUDGE REMOVAL;  Surgeon: Dustin Betancourt MD;  Location: [facility] [place];  Service: Gastroenterology     LAPAROSCOPIC CHOLECYSTECTOMY N/A 12/11/2019    Procedure: CHOLECYSTECTOMY, LAPAROSCOPIC;  Surgeon: Samia Portillo MD;  Location: [facility] [place];  Service: General     [place] LAP,DIAGNOSTIC ABDOMEN N/A 2/20/2018    Procedure: DIAGNOSTIC LAPAROSCOPY, LYSIS OF  ADHESIONS;  Surgeon: Manish Lyons MD;  Location: [facility];  Service: Gynecology     XR ERCP BILIARY ONLY  12/18/2019     XR ERCP BILIARY ONLY  2/28/2020       Family History:     Family History   Problem Relation Age of Onset     Cancer Mother      Asthma Mother      Asthma Sister      Mental Illness Sister      Heart Disease Sister      Family history reviewed and is not pertinent to the chief complaint or presenting problem    Social History:    reports that she has been  "smoking. She has been smoking about 0.50 packs per day. She has never used smokeless tobacco. She reports previous drug use. She reports that she does not drink alcohol.    Exercise: No regular aerobic activities outside of work as a .  Up and down stairs, carrying laundry or her 2-year-old.  Can provoke shortness of breath    Sleep: Tries to nap while her 2-year-old is around during the daytime.  Works nights    Meds:     Current Outpatient Medications   Medication Sig Dispense Refill     cholecalciferol 50 MCG (2000 UT) CAPS Take 2,000 Units by mouth daily       cyanocobalamin (VITAMIN B-12) 1000 MCG tablet Take 1,000 mcg by mouth daily       dimenhyDRINATE (DRAMAMINE) 50 MG CHEW Take 1 tablet (50 mg) by mouth every 6 hours as needed for other (nausea) 10 tablet 0     ondansetron (ZOFRAN-ODT) 8 MG ODT tab Take 1 tablet (8 mg) by mouth every 8 hours as needed 12 tablet 0     sertraline (ZOLOFT) 50 MG tablet Take 50 mg by mouth every morning       amoxicillin-clavulanate (AUGMENTIN) 875-125 MG per tablet Take 1 tablet by mouth 2 times daily (Patient not taking: Reported on 4/5/2022) 20 tablet 0       Allergies:   Penicillin g, Sumatriptan, Augmentin, Iohexol, Peanut oil, and Peanut [peanut oil]    Review of Systems:     Diffusely positive, for weight loss, visual disturbance, shortness of breath, arm pain, shortness of breath with activity, irregular heartbeat, palpitations, fainting, constipation, diarrhea, nausea, joint pain, muscle weakness, muscle pain, daytime sleepiness, dizziness, loss of balance, confusion, depression, anxiety, easy bruising.  12 point review of systems otherwise negative     Please refer above for cardiac ROS details.       Objective:      Physical Exam  55 kg (121 lb 3.2 oz)  1.651 m (5' 5\")  Body mass index is 20.17 kg/m .  /62 (BP Location: Left arm, Patient Position: Sitting, Cuff Size: Adult Regular)   Pulse 104   Resp 16   Ht 1.651 m (5' 5\")   Wt 55 kg (121 lb " 3.2 oz)   BMI 20.17 kg/m    Supine heart rate 88 bpm blood pressure 104/60.  Standing heart rate 104 blood pressure 88/60 without symptoms      General Appearance : Awake, Alert, No acute distress.  Thin, anxious  HEENT: No Scleral icterus; the mucous membranes were pink and moist.  Conjunctivae not injected  Neck:  No cervical bruits, jugular venous distention, or thyromegaly   Chest: The spine was straight. Chest wall symmetric.  No chest wall tenderness.  Lungs: Respirations unlabored; the lungs reveal few scattered rhonchi which clear with a cough, but are otherwise clear to auscultation.  No wheezing   Cardiovascular:   Normal point of maximal impulse.  Auscultation reveals normal first and second heart sounds with no murmurs, rubs, or gallops.  Carotid, radial, and dorsalis pedal pulses are intact and symmetric.  Abdomen: Scaphoid.  No organomegaly, masses, bruits, or tenderness. Bowels sounds are present  Extremities: No edema  Skin: No xanthelasma. Warm, Dry.  Musculoskeletal: No tenderness.  Neurologic: Alert and oriented ×3. Speech is fluent.      EKG (personally reviewed):  3/24/2022: Normal sinus rhythm.  Normal ECG.  71 bpm.    Cardiac Imaging Studies:  None    Lab Review   Lab Results   Component Value Date     03/24/2022    CO2 23 03/24/2022    BUN 7 03/24/2022     Lab Results   Component Value Date    WBC 6.4 03/24/2022    HGB 10.6 03/24/2022    HCT 34.4 03/24/2022    MCV 82 03/24/2022     03/24/2022     No results found for: CHOL, TRIG, HDL  Lab Results   Component Value Date    TROPONINI <0.01 03/24/2022     Lab Results   Component Value Date    BNP <10 12/10/2019     Lab Results   Component Value Date    TSH 1.09 03/24/2022       Pepe Watts MD, MD [facility]      This note created using Dragon voice recognition software. Sound alike errors may have escaped editing.   50 minutes spent today with chart review, examination, discussion with patient plans and further evaluation

## 2022-04-05 NOTE — LETTER
4/5/2022    Andria Hernández MD  [facility] [address]  [facility] [address]    RE: Renata Staton       Dear Colleague,     I had the pleasure of seeing Renata Staton in the [facility].    CARDIOLOGY RAPID ACCESS CLINIC CONSULT NOTE     Assessment/Plan:   1.  Orthostatic lightheadedness.  This is associated with hypotension, and I suspect related to hypovolemia.  Increased oral intake was advocated along with nutrition today containing fluids to help maintain her weight.  Anemia is mild.  Does not appear to be a cardiac cause of the symptoms  2.  Atypical chest pain, likely noncardiac in etiology.  ECG normal.  3.  Anorexia, weight loss, nausea.  Suggested follow-up with her primary care provider or GI evaluation.    Follow-up as needed     History of Present Illness:     It is my pleasure to see Renata Staton at the [facility] RAPID ACCESS clinic for evaluation of lightheadedness, near syncope.    Renata Staton is a 26 year old female with a past medical history of gallstone pancreatitis, with recurrent and persistent nausea and vomiting.  She has documented 13 pound weight loss in the last 17 months, and reports a 40 pound weight loss over the last 2 years.  She reports chronic nausea vomiting abdominal pain and dizziness have been a daily occurrence decreasing her appetite.  More recently she has been experiencing 2 months of lightheadedness and near syncope which does not seem to be better with food intake.  She has felt weak and fallen after seeing spots upon standing.  She has not lost consciousness.  She has no associated chest pain.  She has had some bouts of chest pain while recumbent, associated with nausea.  This is not particularly worsened with activity.    She still smokes half pack of cigarettes daily, but has nicotine patches which she is looking for clearance to start using.  She reports half a cup of decaf and an egg as her intake so  far today.  She does report having lasagna last night.  She reports finances are not an issue.    Past Medical History:     Patient Active Problem List   Diagnosis     Chronic pelvic pain in female     Tobacco use in pregnancy     Anxiety     Single liveborn infant delivered vaginally     Vaginal infection     Acute pancreatitis without infection or necrosis     Acute gallstone pancreatitis     Hypokalemia     Elevated AST (SGOT)     Acute pancreatitis, unspecified complication status, unspecified pancreatitis type     Cholelithiasis and acute cholecystitis with obstruction     Urinary retention     Pancreatitis, recurrent     Smoker     Migraine     Kidney disease     Chronic daily headache     Anxiety and depression     B12 deficiency       Past Surgical History:     Past Surgical History:   Procedure Laterality Date     ENDOSCOPIC RETROGRADE CHOLANGIOPANCREATOGRAM N/A 12/18/2019    Procedure: ENDOSCOPIC RETROGRADE CHOLANGIOPANCREATOGRAPHY with billary sphinctomy and stone extraction and  billary stent placement with epinephrine injection ;  Surgeon: Dustin Betancourt MD;  Location: [facility];  Service: Gastroenterology     ENDOSCOPIC RETROGRADE CHOLANGIOPANCREATOGRAM N/A 2/28/2020    Procedure: ENDOSCOPIC RETROGRADE CHOLANGIOPANCREATOGRAPHY, STENT REMOVAL, SLUDGE REMOVAL;  Surgeon: Dustin Betancourt MD;  Location: [facility];  Service: Gastroenterology     LAPAROSCOPIC CHOLECYSTECTOMY N/A 12/11/2019    Procedure: CHOLECYSTECTOMY, LAPAROSCOPIC;  Surgeon: Samia Portillo MD;  Location: [facility] OR;  Service: General     FL LAP,DIAGNOSTIC ABDOMEN N/A 2/20/2018    Procedure: DIAGNOSTIC LAPAROSCOPY, LYSIS OF  ADHESIONS;  Surgeon: Manish Lyons MD;  Location: [facility];  Service: Gynecology     XR ERCP BILIARY ONLY  12/18/2019     XR ERCP BILIARY ONLY  2/28/2020       Family History:     Family History   Problem Relation Age of Onset     Cancer Mother      Asthma Mother       Asthma Sister      Mental Illness Sister      Heart Disease Sister      Family history reviewed and is not pertinent to the chief complaint or presenting problem    Social History:    reports that she has been smoking. She has been smoking about 0.50 packs per day. She has never used smokeless tobacco. She reports previous drug use. She reports that she does not drink alcohol.    Exercise: No regular aerobic activities outside of work as a .  Up and down stairs, carrying laundry or her 2-year-old.  Can provoke shortness of breath    Sleep: Tries to nap while her 2-year-old is around during the daytime.  Works nights    Meds:     Current Outpatient Medications   Medication Sig Dispense Refill     cholecalciferol 50 MCG (2000 UT) CAPS Take 2,000 Units by mouth daily       cyanocobalamin (VITAMIN B-12) 1000 MCG tablet Take 1,000 mcg by mouth daily       dimenhyDRINATE (DRAMAMINE) 50 MG CHEW Take 1 tablet (50 mg) by mouth every 6 hours as needed for other (nausea) 10 tablet 0     ondansetron (ZOFRAN-ODT) 8 MG ODT tab Take 1 tablet (8 mg) by mouth every 8 hours as needed 12 tablet 0     sertraline (ZOLOFT) 50 MG tablet Take 50 mg by mouth every morning       amoxicillin-clavulanate (AUGMENTIN) 875-125 MG per tablet Take 1 tablet by mouth 2 times daily (Patient not taking: Reported on 4/5/2022) 20 tablet 0       Allergies:   Penicillin g, Sumatriptan, Augmentin, Iohexol, Peanut oil, and Peanut [peanut oil]    Review of Systems:     Diffusely positive, for weight loss, visual disturbance, shortness of breath, arm pain, shortness of breath with activity, irregular heartbeat, palpitations, fainting, constipation, diarrhea, nausea, joint pain, muscle weakness, muscle pain, daytime sleepiness, dizziness, loss of balance, confusion, depression, anxiety, easy bruising.  12 point review of systems otherwise negative     Please refer above for cardiac ROS details.       Objective:      Physical Exam  55 kg (121 lb 3.2  "oz)  1.651 m (5' 5\")  Body mass index is 20.17 kg/m .  /62 (BP Location: Left arm, Patient Position: Sitting, Cuff Size: Adult Regular)   Pulse 104   Resp 16   Ht 1.651 m (5' 5\")   Wt 55 kg (121 lb 3.2 oz)   BMI 20.17 kg/m    Supine heart rate 88 bpm blood pressure 104/60.  Standing heart rate 104 blood pressure 88/60 without symptoms      General Appearance : Awake, Alert, No acute distress.  Thin, anxious  HEENT: No Scleral icterus; the mucous membranes were pink and moist.  Conjunctivae not injected  Neck:  No cervical bruits, jugular venous distention, or thyromegaly   Chest: The spine was straight. Chest wall symmetric.  No chest wall tenderness.  Lungs: Respirations unlabored; the lungs reveal few scattered rhonchi which clear with a cough, but are otherwise clear to auscultation.  No wheezing   Cardiovascular:   Normal point of maximal impulse.  Auscultation reveals normal first and second heart sounds with no murmurs, rubs, or gallops.  Carotid, radial, and dorsalis pedal pulses are intact and symmetric.  Abdomen: Scaphoid.  No organomegaly, masses, bruits, or tenderness. Bowels sounds are present  Extremities: No edema  Skin: No xanthelasma. Warm, Dry.  Musculoskeletal: No tenderness.  Neurologic: Alert and oriented ×3. Speech is fluent.      EKG (personally reviewed):  3/24/2022: Normal sinus rhythm.  Normal ECG.  71 bpm.    Cardiac Imaging Studies:  None    Lab Review   Lab Results   Component Value Date     03/24/2022    CO2 23 03/24/2022    BUN 7 03/24/2022     Lab Results   Component Value Date    WBC 6.4 03/24/2022    HGB 10.6 03/24/2022    HCT 34.4 03/24/2022    MCV 82 03/24/2022     03/24/2022     No results found for: CHOL, TRIG, HDL  Lab Results   Component Value Date    TROPONINI <0.01 03/24/2022     Lab Results   Component Value Date    BNP <10 12/10/2019     Lab Results   Component Value Date    TSH 1.09 03/24/2022       Pepe Watts MD, MD FACC      This note " created using Dragon voice recognition software. Sound alike errors may have escaped editing.   50 minutes spent today with chart review, examination, discussion with patient plans and further evaluation    Thank you for allowing me to participate in the care of your patient.      Sincerely,     Pepe Watts MD     [facility]  cc:   Yudi Mac MD  [facility] [id number]

## 2022-05-11 ENCOUNTER — HOSPITAL ENCOUNTER (EMERGENCY)
Facility: HOSPITAL | Age: 27
Discharge: LEFT BEFORE DISCHARGE | End: 2022-05-11
Admitting: EMERGENCY MEDICINE
Payer: COMMERCIAL

## 2022-05-11 VITALS
TEMPERATURE: 98.6 F | DIASTOLIC BLOOD PRESSURE: 76 MMHG | SYSTOLIC BLOOD PRESSURE: 136 MMHG | RESPIRATION RATE: 24 BRPM | HEART RATE: 117 BPM | WEIGHT: 120 LBS | BODY MASS INDEX: 19.99 KG/M2 | HEIGHT: 65 IN

## 2022-05-11 LAB
ANION GAP SERPL CALCULATED.3IONS-SCNC: 7 MMOL/L (ref 5–18)
BASOPHILS # BLD AUTO: 0 10E3/UL (ref 0–0.2)
BASOPHILS NFR BLD AUTO: 1 %
BUN SERPL-MCNC: 8 MG/DL (ref 8–22)
CALCIUM SERPL-MCNC: 9.2 MG/DL (ref 8.5–10.5)
CHLORIDE BLD-SCNC: 108 MMOL/L (ref 98–107)
CO2 SERPL-SCNC: 23 MMOL/L (ref 22–31)
CREAT SERPL-MCNC: 0.73 MG/DL (ref 0.6–1.1)
D DIMER PPP FEU-MCNC: 0.32 UG/ML FEU (ref 0–0.5)
EOSINOPHIL # BLD AUTO: 0 10E3/UL (ref 0–0.7)
EOSINOPHIL NFR BLD AUTO: 1 %
ERYTHROCYTE [DISTWIDTH] IN BLOOD BY AUTOMATED COUNT: 18.8 % (ref 10–15)
FLUAV RNA SPEC QL NAA+PROBE: NEGATIVE
FLUBV RNA RESP QL NAA+PROBE: NEGATIVE
GFR SERPL CREATININE-BSD FRML MDRD: >90 ML/MIN/1.73M2
GLUCOSE BLD-MCNC: 89 MG/DL (ref 70–125)
HCG SERPL QL: NEGATIVE
HCT VFR BLD AUTO: 32.8 % (ref 35–47)
HGB BLD-MCNC: 10.3 G/DL (ref 11.7–15.7)
IMM GRANULOCYTES # BLD: 0 10E3/UL
IMM GRANULOCYTES NFR BLD: 1 %
LYMPHOCYTES # BLD AUTO: 1.8 10E3/UL (ref 0.8–5.3)
LYMPHOCYTES NFR BLD AUTO: 32 %
MAGNESIUM SERPL-MCNC: 2.1 MG/DL (ref 1.8–2.6)
MCH RBC QN AUTO: 26.1 PG (ref 26.5–33)
MCHC RBC AUTO-ENTMCNC: 31.4 G/DL (ref 31.5–36.5)
MCV RBC AUTO: 83 FL (ref 78–100)
MONOCYTES # BLD AUTO: 0.3 10E3/UL (ref 0–1.3)
MONOCYTES NFR BLD AUTO: 6 %
NEUTROPHILS # BLD AUTO: 3.4 10E3/UL (ref 1.6–8.3)
NEUTROPHILS NFR BLD AUTO: 59 %
NRBC # BLD AUTO: 0 10E3/UL
NRBC BLD AUTO-RTO: 0 /100
PLATELET # BLD AUTO: 370 10E3/UL (ref 150–450)
POTASSIUM BLD-SCNC: 3.9 MMOL/L (ref 3.5–5)
RBC # BLD AUTO: 3.94 10E6/UL (ref 3.8–5.2)
SARS-COV-2 RNA RESP QL NAA+PROBE: NEGATIVE
SODIUM SERPL-SCNC: 138 MMOL/L (ref 136–145)
TROPONIN I SERPL-MCNC: <0.01 NG/ML (ref 0–0.29)
WBC # BLD AUTO: 5.6 10E3/UL (ref 4–11)

## 2022-05-11 PROCEDURE — 96361 HYDRATE IV INFUSION ADD-ON: CPT

## 2022-05-11 PROCEDURE — 258N000003 HC RX IP 258 OP 636: Performed by: EMERGENCY MEDICINE

## 2022-05-11 PROCEDURE — 36415 COLL VENOUS BLD VENIPUNCTURE: CPT | Performed by: EMERGENCY MEDICINE

## 2022-05-11 PROCEDURE — 85025 COMPLETE CBC W/AUTO DIFF WBC: CPT | Performed by: EMERGENCY MEDICINE

## 2022-05-11 PROCEDURE — 83735 ASSAY OF MAGNESIUM: CPT | Performed by: EMERGENCY MEDICINE

## 2022-05-11 PROCEDURE — 96375 TX/PRO/DX INJ NEW DRUG ADDON: CPT

## 2022-05-11 PROCEDURE — 99284 EMERGENCY DEPT VISIT MOD MDM: CPT | Mod: 25

## 2022-05-11 PROCEDURE — 84484 ASSAY OF TROPONIN QUANT: CPT | Performed by: EMERGENCY MEDICINE

## 2022-05-11 PROCEDURE — 85379 FIBRIN DEGRADATION QUANT: CPT | Performed by: EMERGENCY MEDICINE

## 2022-05-11 PROCEDURE — 84703 CHORIONIC GONADOTROPIN ASSAY: CPT | Performed by: EMERGENCY MEDICINE

## 2022-05-11 PROCEDURE — 87636 SARSCOV2 & INF A&B AMP PRB: CPT | Performed by: EMERGENCY MEDICINE

## 2022-05-11 PROCEDURE — 93005 ELECTROCARDIOGRAM TRACING: CPT | Performed by: EMERGENCY MEDICINE

## 2022-05-11 PROCEDURE — 250N000011 HC RX IP 250 OP 636: Performed by: EMERGENCY MEDICINE

## 2022-05-11 PROCEDURE — 80048 BASIC METABOLIC PNL TOTAL CA: CPT | Performed by: EMERGENCY MEDICINE

## 2022-05-11 PROCEDURE — 96374 THER/PROPH/DIAG INJ IV PUSH: CPT

## 2022-05-11 PROCEDURE — C9803 HOPD COVID-19 SPEC COLLECT: HCPCS

## 2022-05-11 RX ORDER — DIPHENHYDRAMINE HYDROCHLORIDE 50 MG/ML
25 INJECTION INTRAMUSCULAR; INTRAVENOUS ONCE
Status: COMPLETED | OUTPATIENT
Start: 2022-05-11 | End: 2022-05-11

## 2022-05-11 RX ORDER — ONDANSETRON 2 MG/ML
4 INJECTION INTRAMUSCULAR; INTRAVENOUS ONCE
Status: COMPLETED | OUTPATIENT
Start: 2022-05-11 | End: 2022-05-11

## 2022-05-11 RX ORDER — KETOROLAC TROMETHAMINE 30 MG/ML
30 INJECTION, SOLUTION INTRAMUSCULAR; INTRAVENOUS ONCE
Status: COMPLETED | OUTPATIENT
Start: 2022-05-11 | End: 2022-05-11

## 2022-05-11 RX ADMIN — SODIUM CHLORIDE 1000 ML: 9 INJECTION, SOLUTION INTRAVENOUS at 15:29

## 2022-05-11 RX ADMIN — DIPHENHYDRAMINE HYDROCHLORIDE 25 MG: 50 INJECTION, SOLUTION INTRAMUSCULAR; INTRAVENOUS at 15:23

## 2022-05-11 RX ADMIN — ONDANSETRON 4 MG: 2 INJECTION INTRAMUSCULAR; INTRAVENOUS at 15:14

## 2022-05-11 RX ADMIN — PROCHLORPERAZINE EDISYLATE 10 MG: 5 INJECTION INTRAMUSCULAR; INTRAVENOUS at 15:27

## 2022-05-11 RX ADMIN — KETOROLAC TROMETHAMINE 30 MG: 30 INJECTION, SOLUTION INTRAMUSCULAR at 15:17

## 2022-05-11 NOTE — ED NOTES
"ED Provider In Triage Note  [facility]  Encounter Date: May 11, 2022    Chief Complaint   Patient presents with     Dizziness       Brief HPI:   Renata Staton is a 26 year old female presenting to the Emergency Department with a chief complaint of 1.5 days of feeling unwell. She describes headache, lightheadedness, nausea. Unknown if she could be pregnant. Took a home neg covid test.    Brief Physical Exam:  /76   Pulse 117   Temp 98.6  F (37  C)   Resp 24   Ht 1.651 m (5' 5\")   Wt 54.4 kg (120 lb)   BMI 19.97 kg/m    General: Non-toxic appearing  HEENT: Atraumatic  Resp: No respiratory distress  Abdomen: Non-peritoneal  Neuro: Alert, oriented, answers questions appropriately  Psych: Behavior appropriate      Plan Initiated in Triage:  Orders Placed This Encounter     Basic metabolic panel     HCG qualitative Blood     Symptomatic; Unknown Influenza A/B & SARS-CoV2 (COVID-19) Virus PCR Multiplex     Magnesium     Troponin I     0.9% sodium chloride BOLUS     ondansetron (ZOFRAN) injection 4 mg     prochlorperazine (COMPAZINE) injection 10 mg     diphenhydrAMINE (BENADRYL) injection 25 mg     ketorolac (TORADOL) injection 30 mg       PIT Dispo:   Return to lobby while awaiting workup and ED bed availability    China Mead MD on 5/11/2022 at 2:42 PM    Patient was evaluated by the Physician in Triage due to a limitation of available rooms in the Emergency Department. A plan of care was discussed based on the information obtained on the initial evaluation and patient was consuled to return back to the Emergency Department lobby after this initial evalutaiton until results were obtained or a room became available in the Emergency Department. Patient was counseled not to leave prior to receiving the results of their workup.     China Mead MD  [facility] EMERGENCY DEPARTMENT  [address] [place] [address] [id number] [phone]   "   China Mead MD  05/11/22 2127

## 2022-05-11 NOTE — ED TRIAGE NOTES
Triage Assessment     Row Name 05/11/22 1444       Triage Assessment (Adult)    Airway WDL WDL       Respiratory WDL    Respiratory WDL rhythm/pattern    Rhythm/Pattern, Respiratory tachypneic            Pt with c/o not feeling well for the past day and a half.  Pt feeling lightheaded/dizzy and nauseated.  Pt also c/o h/a on the right side.  Pt last took ibuprofen at 0900..  Pt did 2 covid tests at home and both negative.

## 2022-05-13 LAB
ATRIAL RATE - MUSE: 86 BPM
DIASTOLIC BLOOD PRESSURE - MUSE: NORMAL MMHG
INTERPRETATION ECG - MUSE: NORMAL
P AXIS - MUSE: 37 DEGREES
PR INTERVAL - MUSE: 100 MS
QRS DURATION - MUSE: 80 MS
QT - MUSE: 348 MS
QTC - MUSE: 416 MS
R AXIS - MUSE: 72 DEGREES
SYSTOLIC BLOOD PRESSURE - MUSE: NORMAL MMHG
T AXIS - MUSE: 51 DEGREES
VENTRICULAR RATE- MUSE: 86 BPM

## 2022-06-16 ENCOUNTER — HOSPITAL ENCOUNTER (EMERGENCY)
Facility: HOSPITAL | Age: 27
Discharge: HOME | End: 2022-06-17
Attending: EMERGENCY MEDICINE | Admitting: EMERGENCY MEDICINE
Payer: COMMERCIAL

## 2022-06-16 ENCOUNTER — NURSE TRIAGE (OUTPATIENT)
Dept: NURSING | Facility: CLINIC | Age: 27
End: 2022-06-16
Payer: COMMERCIAL

## 2022-06-16 DIAGNOSIS — M79.651 MUSCULOSKELETAL PAIN OF RIGHT THIGH: ICD-10-CM

## 2022-06-16 LAB
BASOPHILS # BLD AUTO: 0 10E3/UL (ref 0–0.2)
BASOPHILS NFR BLD AUTO: 0 %
EOSINOPHIL # BLD AUTO: 0.1 10E3/UL (ref 0–0.7)
EOSINOPHIL NFR BLD AUTO: 1 %
ERYTHROCYTE [DISTWIDTH] IN BLOOD BY AUTOMATED COUNT: 15.4 % (ref 10–15)
HCT VFR BLD AUTO: 32.7 % (ref 35–47)
HGB BLD-MCNC: 10.4 G/DL (ref 11.7–15.7)
IMM GRANULOCYTES # BLD: 0 10E3/UL
IMM GRANULOCYTES NFR BLD: 1 %
LYMPHOCYTES # BLD AUTO: 2.8 10E3/UL (ref 0.8–5.3)
LYMPHOCYTES NFR BLD AUTO: 32 %
MCH RBC QN AUTO: 26.7 PG (ref 26.5–33)
MCHC RBC AUTO-ENTMCNC: 31.8 G/DL (ref 31.5–36.5)
MCV RBC AUTO: 84 FL (ref 78–100)
MONOCYTES # BLD AUTO: 0.8 10E3/UL (ref 0–1.3)
MONOCYTES NFR BLD AUTO: 9 %
NEUTROPHILS # BLD AUTO: 5 10E3/UL (ref 1.6–8.3)
NEUTROPHILS NFR BLD AUTO: 57 %
NRBC # BLD AUTO: 0 10E3/UL
NRBC BLD AUTO-RTO: 0 /100
PLATELET # BLD AUTO: 313 10E3/UL (ref 150–450)
RBC # BLD AUTO: 3.9 10E6/UL (ref 3.8–5.2)
WBC # BLD AUTO: 8.7 10E3/UL (ref 4–11)

## 2022-06-16 PROCEDURE — 83735 ASSAY OF MAGNESIUM: CPT | Performed by: EMERGENCY MEDICINE

## 2022-06-16 PROCEDURE — 80048 BASIC METABOLIC PNL TOTAL CA: CPT | Performed by: EMERGENCY MEDICINE

## 2022-06-16 PROCEDURE — 99284 EMERGENCY DEPT VISIT MOD MDM: CPT | Mod: 25

## 2022-06-16 PROCEDURE — 85025 COMPLETE CBC W/AUTO DIFF WBC: CPT | Performed by: EMERGENCY MEDICINE

## 2022-06-16 PROCEDURE — 250N000013 HC RX MED GY IP 250 OP 250 PS 637: Performed by: EMERGENCY MEDICINE

## 2022-06-16 PROCEDURE — 36415 COLL VENOUS BLD VENIPUNCTURE: CPT | Performed by: EMERGENCY MEDICINE

## 2022-06-16 RX ORDER — OXYCODONE HYDROCHLORIDE 5 MG/1
10 TABLET ORAL ONCE
Status: COMPLETED | OUTPATIENT
Start: 2022-06-17 | End: 2022-06-16

## 2022-06-16 RX ORDER — ONDANSETRON 2 MG/ML
4 INJECTION INTRAMUSCULAR; INTRAVENOUS EVERY 30 MIN PRN
Status: DISCONTINUED | OUTPATIENT
Start: 2022-06-16 | End: 2022-06-17 | Stop reason: HOSPADM

## 2022-06-16 RX ADMIN — OXYCODONE HYDROCHLORIDE 10 MG: 5 TABLET ORAL at 23:45

## 2022-06-16 ASSESSMENT — ENCOUNTER SYMPTOMS: SHORTNESS OF BREATH: 1

## 2022-06-17 ENCOUNTER — APPOINTMENT (OUTPATIENT)
Dept: CT IMAGING | Facility: HOSPITAL | Age: 27
End: 2022-06-17
Payer: COMMERCIAL

## 2022-06-17 ENCOUNTER — NURSE TRIAGE (OUTPATIENT)
Dept: NURSING | Facility: CLINIC | Age: 27
End: 2022-06-17
Payer: COMMERCIAL

## 2022-06-17 ENCOUNTER — APPOINTMENT (OUTPATIENT)
Dept: ULTRASOUND IMAGING | Facility: HOSPITAL | Age: 27
End: 2022-06-17
Attending: EMERGENCY MEDICINE
Payer: COMMERCIAL

## 2022-06-17 ENCOUNTER — HOSPITAL ENCOUNTER (EMERGENCY)
Facility: HOSPITAL | Age: 27
Discharge: HOME | End: 2022-06-17
Admitting: EMERGENCY MEDICINE
Payer: COMMERCIAL

## 2022-06-17 ENCOUNTER — APPOINTMENT (OUTPATIENT)
Dept: RADIOLOGY | Facility: HOSPITAL | Age: 27
End: 2022-06-17
Payer: COMMERCIAL

## 2022-06-17 VITALS
WEIGHT: 117 LBS | OXYGEN SATURATION: 98 % | HEIGHT: 65 IN | SYSTOLIC BLOOD PRESSURE: 115 MMHG | DIASTOLIC BLOOD PRESSURE: 73 MMHG | RESPIRATION RATE: 23 BRPM | TEMPERATURE: 98.2 F | HEART RATE: 94 BPM | BODY MASS INDEX: 19.49 KG/M2

## 2022-06-17 VITALS
WEIGHT: 117 LBS | BODY MASS INDEX: 19.49 KG/M2 | DIASTOLIC BLOOD PRESSURE: 64 MMHG | SYSTOLIC BLOOD PRESSURE: 97 MMHG | OXYGEN SATURATION: 100 % | HEART RATE: 81 BPM | TEMPERATURE: 99.1 F | RESPIRATION RATE: 19 BRPM | HEIGHT: 65 IN

## 2022-06-17 DIAGNOSIS — R42 LIGHT HEADED: ICD-10-CM

## 2022-06-17 DIAGNOSIS — K04.7 PERIAPICAL ABSCESS: ICD-10-CM

## 2022-06-17 DIAGNOSIS — R07.9 CHEST PAIN: ICD-10-CM

## 2022-06-17 LAB
ANION GAP SERPL CALCULATED.3IONS-SCNC: 7 MMOL/L (ref 5–18)
ANION GAP SERPL CALCULATED.3IONS-SCNC: 8 MMOL/L (ref 5–18)
BUN SERPL-MCNC: 10 MG/DL (ref 8–22)
BUN SERPL-MCNC: 7 MG/DL (ref 8–22)
CALCIUM SERPL-MCNC: 9.2 MG/DL (ref 8.5–10.5)
CALCIUM SERPL-MCNC: 9.7 MG/DL (ref 8.5–10.5)
CHLORIDE BLD-SCNC: 105 MMOL/L (ref 98–107)
CHLORIDE BLD-SCNC: 105 MMOL/L (ref 98–107)
CO2 SERPL-SCNC: 25 MMOL/L (ref 22–31)
CO2 SERPL-SCNC: 26 MMOL/L (ref 22–31)
CREAT SERPL-MCNC: 0.72 MG/DL (ref 0.6–1.1)
CREAT SERPL-MCNC: 0.73 MG/DL (ref 0.6–1.1)
D DIMER PPP FEU-MCNC: 0.47 UG/ML FEU (ref 0–0.5)
ERYTHROCYTE [DISTWIDTH] IN BLOOD BY AUTOMATED COUNT: 15.5 % (ref 10–15)
GFR SERPL CREATININE-BSD FRML MDRD: >90 ML/MIN/1.73M2
GFR SERPL CREATININE-BSD FRML MDRD: >90 ML/MIN/1.73M2
GLUCOSE BLD-MCNC: 121 MG/DL (ref 70–125)
GLUCOSE BLD-MCNC: 94 MG/DL (ref 70–125)
HCG UR QL: NEGATIVE
HCT VFR BLD AUTO: 34.9 % (ref 35–47)
HGB BLD-MCNC: 11 G/DL (ref 11.7–15.7)
LACTATE SERPL-SCNC: 0.7 MMOL/L (ref 0.7–2)
MAGNESIUM SERPL-MCNC: 1.9 MG/DL (ref 1.8–2.6)
MCH RBC QN AUTO: 26.8 PG (ref 26.5–33)
MCHC RBC AUTO-ENTMCNC: 31.5 G/DL (ref 31.5–36.5)
MCV RBC AUTO: 85 FL (ref 78–100)
PLATELET # BLD AUTO: 354 10E3/UL (ref 150–450)
POTASSIUM BLD-SCNC: 3.7 MMOL/L (ref 3.5–5)
POTASSIUM BLD-SCNC: 3.9 MMOL/L (ref 3.5–5)
RBC # BLD AUTO: 4.11 10E6/UL (ref 3.8–5.2)
SODIUM SERPL-SCNC: 137 MMOL/L (ref 136–145)
SODIUM SERPL-SCNC: 139 MMOL/L (ref 136–145)
TROPONIN I SERPL-MCNC: <0.01 NG/ML (ref 0–0.29)
WBC # BLD AUTO: 7.9 10E3/UL (ref 4–11)

## 2022-06-17 PROCEDURE — 96361 HYDRATE IV INFUSION ADD-ON: CPT

## 2022-06-17 PROCEDURE — 80048 BASIC METABOLIC PNL TOTAL CA: CPT | Performed by: STUDENT IN AN ORGANIZED HEALTH CARE EDUCATION/TRAINING PROGRAM

## 2022-06-17 PROCEDURE — 85014 HEMATOCRIT: CPT | Performed by: STUDENT IN AN ORGANIZED HEALTH CARE EDUCATION/TRAINING PROGRAM

## 2022-06-17 PROCEDURE — 258N000003 HC RX IP 258 OP 636: Performed by: STUDENT IN AN ORGANIZED HEALTH CARE EDUCATION/TRAINING PROGRAM

## 2022-06-17 PROCEDURE — 93005 ELECTROCARDIOGRAM TRACING: CPT | Performed by: EMERGENCY MEDICINE

## 2022-06-17 PROCEDURE — 81025 URINE PREGNANCY TEST: CPT | Performed by: EMERGENCY MEDICINE

## 2022-06-17 PROCEDURE — 93970 EXTREMITY STUDY: CPT

## 2022-06-17 PROCEDURE — 84484 ASSAY OF TROPONIN QUANT: CPT | Performed by: EMERGENCY MEDICINE

## 2022-06-17 PROCEDURE — 36415 COLL VENOUS BLD VENIPUNCTURE: CPT | Performed by: STUDENT IN AN ORGANIZED HEALTH CARE EDUCATION/TRAINING PROGRAM

## 2022-06-17 PROCEDURE — 99285 EMERGENCY DEPT VISIT HI MDM: CPT | Mod: 25

## 2022-06-17 PROCEDURE — 70450 CT HEAD/BRAIN W/O DYE: CPT

## 2022-06-17 PROCEDURE — 85379 FIBRIN DEGRADATION QUANT: CPT | Performed by: EMERGENCY MEDICINE

## 2022-06-17 PROCEDURE — 96360 HYDRATION IV INFUSION INIT: CPT

## 2022-06-17 PROCEDURE — 71045 X-RAY EXAM CHEST 1 VIEW: CPT

## 2022-06-17 PROCEDURE — 36415 COLL VENOUS BLD VENIPUNCTURE: CPT | Performed by: EMERGENCY MEDICINE

## 2022-06-17 PROCEDURE — 83605 ASSAY OF LACTIC ACID: CPT | Performed by: STUDENT IN AN ORGANIZED HEALTH CARE EDUCATION/TRAINING PROGRAM

## 2022-06-17 RX ORDER — CLINDAMYCIN HCL 300 MG
300 CAPSULE ORAL 3 TIMES DAILY
Qty: 30 CAPSULE | Refills: 0 | Status: SHIPPED | OUTPATIENT
Start: 2022-06-17 | End: 2022-06-27

## 2022-06-17 RX ADMIN — SODIUM CHLORIDE 1000 ML: 9 INJECTION, SOLUTION INTRAVENOUS at 18:54

## 2022-06-17 ASSESSMENT — ENCOUNTER SYMPTOMS
FEVER: 0
NAUSEA: 1
BLOOD IN STOOL: 0
CHILLS: 0
LIGHT-HEADEDNESS: 1
SHORTNESS OF BREATH: 0
COUGH: 0
HEADACHES: 1
VOMITING: 0

## 2022-06-17 NOTE — TELEPHONE ENCOUNTER
On Monday patient had first iron infusion. Over the last couple days since the infusion patient states she hasnt been feeling well. Tonight patients right leg is hot to touch, burning sensation, and pain and veins seem to be poping out of the inner thigh.  Patient is also complaining of shortness of breath.  She states she has some anemia so not sure if it is because of the current issue or anemia.    Patient states it is very difficult to walk.  Pain without walking on it is 8/10, it is more if she walks on it.    Patient denies any leg injury, no chest pain.    Patient plans to go to ED now.    Yudi Holden RN   06/16/22 11:02 PM  [facility] Nurse Advisor    Reason for Disposition    Unable to walk    Additional Information    Negative: Looks like a broken bone or dislocated joint (e.g., crooked or deformed)    Negative: Sounds like a life-threatening emergency to the triager    Negative: Followed a leg injury    Negative: Leg swelling is main symptom    Negative: Back pain radiating (shooting) into leg(s)    Negative: Knee pain is main symptom    Negative: Ankle pain is main symptom    Negative: Pregnant    Negative: Postpartum (from 0 to 6 weeks after delivery)    Negative: Chest pain    Negative: Difficulty breathing    Negative: Entire foot is cool or blue in comparison to other side    Protocols used: LEG PAIN-A-AH       Hyperbilirubinemia, unconjugated, of prematurity

## 2022-06-17 NOTE — ED PROVIDER NOTES
NAME: Renata Staton  AGE: 26 year old female  YOB: 1995  MRN: [id number]  EVALUATION DATE & TIME: 2022 11:22 PM    PCP: Andria Hernández    ED PROVIDER: Jose Foote M.D.      Chief Complaint   Patient presents with     right leg pain/SOB         FINAL IMPRESSION:  1. Musculoskeletal pain of right thigh        MEDICAL DECISION MAKIN:28 PM I met with the patient, obtained history, performed an initial exam, and discussed options and plan for diagnostics and treatment here in the ED.   12:45 AM We discussed the plan for discharge and the patient is agreeable. Reviewed supportive cares, symptomatic treatment, outpatient follow up, and reasons to return to the Emergency Department. All questions and concerns were addressed. Patient to be discharged by ED RN.      Patient was clinically assessed and consented to treatment. After assessment, medical decision making and workup were discussed with the patient. The patient was agreeable to plan for testing, workup, and treatment.  Pertinent Labs & Imaging studies reviewed. (See chart for details)         Renata Staton is a 26 year old female who presents with right leg pain.   Differential diagnosis includes but not limited to DVT, pulmonary embolism, muscle spasm, electrolyte abnormality.  Patient with burning pain in her medial thigh on the right side.  She is not really tender over the veins nor tender in the calf.  She reports swelling in the foot however on examination it is difficult to discern any swelling on right compared to left.  She also reports some pain in the left leg few days ago.  Bilateral ultrasound to be obtained.  Patient given pain medication and no signs of acute respiratory stress.  Patient initially tachycardic but also very anxious appearing.  Tachycardia resolved on its own and she never had desaturations or hypoxemia.  I have very low suspicion for pulmonary embolism.  Bilateral ultrasounds negative and  unlikely pulm embolism, DVT, or x acute vascular process.  After reassurance patient will be plan for discharge home may use over-the-counter pain medication and recommend warm compresses over the area over the thigh for muscle relief.  No findings of erythema or infection on thigh and following reassurance will be plan for discharge home    0 minutes of critical care time    MEDICATIONS GIVEN IN THE EMERGENCY:  Medications   oxyCODONE (ROXICODONE) tablet 10 mg (10 mg Oral Given 6/16/22 6255)       NEW PRESCRIPTIONS STARTED AT TODAY'S ER VISIT:  Discharge Medication List as of 6/17/2022 12:47 AM             =================================================================    HPI    Patient information was obtained from: patient     Use of : N/A         Renata Staton is a 26 year old female with a past medical history of kidney disease, anemia, anxiety, pancreatitis, cholelithiasis and acute cholecystitis with obstruction, who presents to the ED for evaluation of shortness of breath and right leg pain.     Patient reports she received an iron infusion in her right hand on Monday. Since then, she was been having increased intermittent burning pain and swelling in her right leg. She also notes a dull muscle ache in her left leg. The patient also endorses mild shortness of breath. She denies any other complaints at this time.    The patient denies any history of clots for PE.     REVIEW OF SYSTEMS   Review of Systems   Respiratory: Positive for shortness of breath (mild).    Cardiovascular: Positive for leg swelling (right).   Musculoskeletal:        Positive for bilateral leg pain   All other systems reviewed and are negative.       PAST MEDICAL HISTORY:  Past Medical History:   Diagnosis Date     Anxiety      Chronic abdominal pain      Chronic pelvic pain in female      Depression      History of anesthesia complications     family issues slow to wake up after surgery     Migraine        PAST  SURGICAL HISTORY:  Past Surgical History:   Procedure Laterality Date     ENDOSCOPIC RETROGRADE CHOLANGIOPANCREATOGRAM N/A 12/18/2019    Procedure: ENDOSCOPIC RETROGRADE CHOLANGIOPANCREATOGRAPHY with billary sphinctomy and stone extraction and  billary stent placement with epinephrine injection ;  Surgeon: Dustin Betancourt MD;  Location: [facility];  Service: Gastroenterology     ENDOSCOPIC RETROGRADE CHOLANGIOPANCREATOGRAM N/A 2/28/2020    Procedure: ENDOSCOPIC RETROGRADE CHOLANGIOPANCREATOGRAPHY, STENT REMOVAL, SLUDGE REMOVAL;  Surgeon: Dustin Betancourt MD;  Location: [facility];  Service: Gastroenterology     LAPAROSCOPIC CHOLECYSTECTOMY N/A 12/11/2019    Procedure: CHOLECYSTECTOMY, LAPAROSCOPIC;  Surgeon: Samia Portillo MD;  Location: [facility];  Service: General     NM LAP,DIAGNOSTIC ABDOMEN N/A 2/20/2018    Procedure: DIAGNOSTIC LAPAROSCOPY, LYSIS OF  ADHESIONS;  Surgeon: Manish Lyons MD;  Location: [facility];  Service: Gynecology     XR ERCP BILIARY ONLY  12/18/2019     XR ERCP BILIARY ONLY  2/28/2020       CURRENT MEDICATIONS:    No current facility-administered medications for this encounter.    Current Outpatient Medications:      amoxicillin-clavulanate (AUGMENTIN) 875-125 MG per tablet, Take 1 tablet by mouth 2 times daily (Patient not taking: Reported on 4/5/2022), Disp: 20 tablet, Rfl: 0     cholecalciferol 50 MCG (2000 UT) CAPS, Take 2,000 Units by mouth daily, Disp: , Rfl:      cyanocobalamin (VITAMIN B-12) 1000 MCG tablet, Take 1,000 mcg by mouth daily, Disp: , Rfl:      dimenhyDRINATE (DRAMAMINE) 50 MG CHEW, Take 1 tablet (50 mg) by mouth every 6 hours as needed for other (nausea), Disp: 10 tablet, Rfl: 0     ondansetron (ZOFRAN-ODT) 8 MG ODT tab, Take 1 tablet (8 mg) by mouth every 8 hours as needed, Disp: 12 tablet, Rfl: 0     sertraline (ZOLOFT) 50 MG tablet, Take 50 mg by mouth every morning, Disp: , Rfl:     ALLERGIES:  Allergies   Allergen  "Reactions     Penicillin G Anaphylaxis     Sumatriptan Hives, Swelling and Anxiety     Went to ED after taking 25mg for first time, no objective findings, likely not true allergy     Augmentin Hives     Iohexol Hives and Itching     Pt broke out in hives after CT IV contrast administration, when returning to the ER from [place].      Peanut Oil Unknown     Peanut [Peanut Oil] Swelling     Throat swelling       FAMILY HISTORY:  Family History   Problem Relation Age of Onset     Cancer Mother      Asthma Mother      Asthma Sister      Mental Illness Sister      Heart Disease Sister        SOCIAL HISTORY:   Social History     Socioeconomic History     Marital status: Single   Tobacco Use     Smoking status: Current Every Day Smoker     Packs/day: 0.50     Smokeless tobacco: Never Used   Substance and Sexual Activity     Alcohol use: No     Comment: Alcoholic Drinks/day: rare     Drug use: Not Currently     Sexual activity: Yes     Partners: Male       PHYSICAL EXAM:    Vitals: /73   Pulse 94   Temp 98.2  F (36.8  C) (Oral)   Resp 23   Ht 1.651 m (5' 5\")   Wt 53.1 kg (117 lb)   LMP 06/16/2022   SpO2 98%   BMI 19.47 kg/m     Physical Exam  Vitals and nursing note reviewed.   Constitutional:       General: She is not in acute distress.     Appearance: Normal appearance. She is normal weight. She is not ill-appearing or toxic-appearing.   HENT:      Head: Normocephalic.   Cardiovascular:      Pulses: Normal pulses.   Pulmonary:      Effort: Pulmonary effort is normal. No respiratory distress.   Musculoskeletal:         General: Tenderness present.      Right upper leg: Tenderness present.        Legs:    Skin:     General: Skin is warm and dry.      Capillary Refill: Capillary refill takes less than 2 seconds.      Coloration: Skin is not pale.      Findings: No erythema.   Neurological:      General: No focal deficit present.      Mental Status: She is alert.      Sensory: No sensory deficit.   Psychiatric:       "   Behavior: Behavior normal.           LAB:  All pertinent labs reviewed and interpreted.  Labs Ordered and Resulted from Time of ED Arrival to Time of ED Departure   CBC WITH PLATELETS AND DIFFERENTIAL - Abnormal       Result Value    WBC Count 8.7      RBC Count 3.90      Hemoglobin 10.4 (*)     Hematocrit 32.7 (*)     MCV 84      MCH 26.7      MCHC 31.8      RDW 15.4 (*)     Platelet Count 313      % Neutrophils 57      % Lymphocytes 32      % Monocytes 9      % Eosinophils 1      % Basophils 0      % Immature Granulocytes 1      NRBCs per 100 WBC 0      Absolute Neutrophils 5.0      Absolute Lymphocytes 2.8      Absolute Monocytes 0.8      Absolute Eosinophils 0.1      Absolute Basophils 0.0      Absolute Immature Granulocytes 0.0      Absolute NRBCs 0.0     BASIC METABOLIC PANEL - Normal    Sodium 137      Potassium 3.9      Chloride 105      Carbon Dioxide (CO2) 25      Anion Gap 7      Urea Nitrogen 10      Creatinine 0.72      Calcium 9.2      Glucose 94      GFR Estimate >90     MAGNESIUM - Normal    Magnesium 1.9         RADIOLOGY:  US Lower Extremity Venous Duplex Bilateral   Final Result   IMPRESSION:   1.  No deep venous thrombosis in the bilateral lower extremities.          PROCEDURES:   Procedures       I, Joann Peck, am serving as a scribe to document services personally performed by Dr. Jose Foote  based on my observation and the provider's statements to me. I, Jose Foote MD attest that Joann Peck is acting in a scribe capacity, has observed my performance of the services and has documented them in accordance with my direction.      Jose Foote M.D.  Emergency Medicine  [facility] Emergency Department     Jose Foote MD  06/17/22 0532

## 2022-06-17 NOTE — ED TRIAGE NOTES
Pt presents with complaints of vertigo upon awakening this morning with nausea, shortness of breath.  Pt was seen last night for leg pain and pt states she hasn't felt right since iron infusion on Monday     Triage Assessment     Row Name 06/17/22 1812       Triage Assessment (Adult)    Airway WDL WDL       Respiratory WDL    Respiratory WDL WDL       Skin Circulation/Temperature WDL    Skin Circulation/Temperature WDL WDL       Cardiac WDL    Cardiac WDL WDL       Peripheral/Neurovascular WDL    Peripheral Neurovascular WDL WDL       Cognitive/Neuro/Behavioral WDL    Cognitive/Neuro/Behavioral WDL WDL

## 2022-06-17 NOTE — ED NOTES
"ED Provider In Triage Note  [facility]  Encounter Date: Jun 17, 2022    Chief Complaint   Patient presents with     Dizziness       Brief HPI:   Renaat Staton is a 26 year old female presenting to the Emergency Department with a chief complaint of     iron infusiun on Monday  [facility] ordered Iron infusion due to mild anemia (11.2) and low ferritin  seen here yesterday for leg pain  woke up this morning feeling LH, dizzy, feeling like going to pass out  Ambulatory    Brief Physical Exam:  /63   Pulse 106   Temp 99.1  F (37.3  C) (Temporal)   Resp 16   Ht 1.651 m (5' 5\")   Wt 53.1 kg (117 lb)   LMP 06/16/2022   SpO2 98%   BMI 19.47 kg/m    General: Non-toxic appearing  HEENT: Atraumatic  Resp: No respiratory distress  Abdomen: Non-peritoneal  Neuro: Alert, oriented, answers questions appropriately  Psych: Behavior appropriate    Plan Initiated in Triage:  No orders of the defined types were placed in this encounter.      PIT Dispo:   Return to [facility] while awaiting workup and ED bed availability    Santhosh Hodgson MD on 6/17/2022 at 6:15 PM    Patient was evaluated by the Physician in Triage due to a limitation of available rooms in the Emergency Department. A plan of care was discussed based on the information obtained on the initial evaluation and patient was consuled to return back to the Emergency Department lobby after this initial evalutaiton until results were obtained or a room became available in the Emergency Department. Patient was counseled not to leave prior to receiving the results of their workup.     Santhosh Hodgson MD  [facility] EMERGENCY DEPARTMENT  [address] [place] [id number]  [phone]     Santhosh Hodgson MD  06/17/22 1833    "

## 2022-06-17 NOTE — TELEPHONE ENCOUNTER
"Nurse Triage SBAR    Is this a 2nd Level Triage? NO    Situation: Patient in ED last night; reports leg cramps are still present but much less since yesterday. Patient reports that since she woke up this morning she has been \"lightheaded, with chest pounding, feel like I will pass out.\"    Background: Patient reports she had an iron infusion on Monday.     Assessment:   SOB, Nausea all day  Has felt dizzy all day  Patient PCP's is at [facility]    Recommendation: Per disposition, call 911 now. Patient stated she would call 911. Patient verbalized understanding and agrees with plan.    Called patient back to follow up; no answer, left message to call back to  at [phone] if she needed additional assistance.    Protocol Recommended Disposition: Call 911    Connie Parker RN on 6/17/2022 at 4:46 PM    Reason for Disposition    Shock suspected (e.g., cold/pale/clammy skin, too weak to stand, low BP, rapid pulse)    Additional Information    Negative: Severe difficulty breathing (e.g., struggling for each breath, speaks in single words)    Negative: [1] Difficulty breathing or swallowing AND [2] started suddenly after medicine, an allergic food or bee sting    Protocols used: DIZZINESS - [id number]      "

## 2022-06-18 ENCOUNTER — NURSE TRIAGE (OUTPATIENT)
Dept: NURSING | Facility: CLINIC | Age: 27
End: 2022-06-18
Payer: COMMERCIAL

## 2022-06-18 NOTE — ED NOTES
Pt is a/o x4. Pt c/o chest pain 7/10 worsen with activity. Denies sob, no distress noted. VSS. Orthostatic hypotension done per evening RN, negative. EKG done, given to Lyndsey SINGLEOTN.

## 2022-06-18 NOTE — ED PROVIDER NOTES
EMERGENCY DEPARTMENT ENCOUNTER      NAME: Renata Staton  AGE: 26 year old female  YOB: 1995  MRN: [id number]  EVALUATION DATE & TIME: 6/17/2022  6:58 PM    PCP: Andria Hernández    ED PROVIDER: Lyndsey Tyler PA-C      Chief Complaint   Patient presents with     Dizziness         FINAL IMPRESSION:  1. Light headed    2. Chest pain    3. Periapical abscess          ED COURSE & MEDICAL DECISION MAKING:    Pertinent Labs & Imaging studies reviewed. (See chart for details)    26 year old female presents to the Emergency Department for evaluation of light headedness and chest pain.    Physical exam is remarkable for a generally well-appearing female who is in no acute distress.  She is somewhat frail appearing.  Heart and lung sounds are clear diffusely throughout.  Abdomen is soft and nontender.  No focal neurologic deficits noted.  Vital signs are stable and she is afebrile.    CBC is remarkable for mild anemia with hemoglobin of 11.0, this is actually improved when compared to her baseline.  BMP is unremarkable with no significant electrolyte derangements, normal kidney function.  Lactic acid is within normal limits.  Troponin is negative, EKG without acute ischemic changes.  D-dimer is within normal limits. A CT scan of the head was obtained which is generally unremarkable as far as intracranially however there is some evidence of sinusitis as well as a left maxillary periapical abscess. I did examine her teeth and did not visualize the abscess but she has poor dentition with multiple broken teeth in the area. CXR normal.     I do not think any further emergent labs or imaging are indicated at this time.  The patient declined any medication for pain or nausea.  the cause of her symptoms is not clear at this time but it sounds as if it has been chronic in nature.  Low concern for PE given negative D-dimer via Wells criteria.  She has had constant chest pain for 8 hours with negative troponin and  EKG so I have very low suspicion for ACS.  She has no focal neurologic deficits and CT of the head is normal.  Discussed that a dental infection/sinusitis may be causing her symptoms so I will treat her with antibiotics for that, she is allergic to penicillins so a prescription for clindamycin was sent to her pharmacy.  Advised follow-up with her primary care provider for recheck, recommend return here for any new or worsening symptoms.  Patient is agreeable with this treatment plan and verbalized her understanding.    ED Course   7:50 PM Performed my initial history and physical exam. Discussed workup in the emergency department, management of symptoms, and likely disposition.   9:42 PM Updated with test results. I discussed the plan for discharge with the patient, and patient is agreeable. We discussed supportive cares at home and reasons for return to the ER including new or worsening symptoms - all questions and concerns addressed. Patient to be discharged by RN.    At the conclusion of the encounter I discussed the results of all of the tests and the disposition. The questions were answered. The patient or family acknowledged understanding and was agreeable with the care plan.     Voice recognition software was used in the creation of this note. Any grammatical or nonsensical errors are due to inherent errors with the software and are not the intention of the writer.     MEDICATIONS GIVEN IN THE EMERGENCY:  Medications   0.9% sodium chloride BOLUS (0 mLs Intravenous Stopped 6/17/22 2034)       NEW PRESCRIPTIONS STARTED AT TODAY'S ER VISIT  Discharge Medication List as of 6/17/2022 10:31 PM      START taking these medications    Details   clindamycin (CLEOCIN) 300 MG capsule Take 1 capsule (300 mg) by mouth 3 times daily for 10 days, Disp-30 capsule, R-0, E-Prescribe                  =================================================================    HPI    Patient information was obtained from: Patient    Use  of : N/A         Renata Staton is a 26 year old female with PMH of pancreatitis, chronic pelvic pain, anxiety, cholelithiasis s/p cholecystectomy who presents to the ED via walk-in for light headedness and chest pain.     Per chart review, the patient was seen in this ED last night for evaluation of leg pain. An US of the bilateral lower extremities was negative. She was discharged home with recommendations for comfort care due to right leg strain.    The patient presents with multiple symptoms including lightheadedness and dull central chest pain, both of which have been present since she woke up this morning.  She states she has these symptoms daily but today they are very persistent which brought her in to the ER. She notes double vision which is typical for these episodes in the past. She does not identify any aggravating or alleviating factors.  She reports associated nausea without vomiting. She also endorses a sore throat on the left side this morning which has since resolved and a headache behind her left eye. She denies any fevers, chills, recent illness, shortness of breath, or black/bloody stools.  She denies any personal cardiac history or history of DVT/PE.      REVIEW OF SYSTEMS   Review of Systems   Constitutional: Negative for chills and fever.   Eyes: Positive for visual disturbance.   Respiratory: Negative for cough and shortness of breath.    Cardiovascular: Positive for chest pain.   Gastrointestinal: Positive for nausea. Negative for blood in stool and vomiting.   Neurological: Positive for light-headedness and headaches. Negative for syncope.     All other systems reviewed and are negative unless noted in HPI.      PAST MEDICAL HISTORY:  Past Medical History:   Diagnosis Date     Anxiety      Chronic abdominal pain      Chronic pelvic pain in female      Depression      History of anesthesia complications     family issues slow to wake up after surgery     Migraine        PAST  SURGICAL HISTORY:  Past Surgical History:   Procedure Laterality Date     ENDOSCOPIC RETROGRADE CHOLANGIOPANCREATOGRAM N/A 12/18/2019    Procedure: ENDOSCOPIC RETROGRADE CHOLANGIOPANCREATOGRAPHY with billary sphinctomy and stone extraction and  billary stent placement with epinephrine injection ;  Surgeon: Dustin Betancourt MD;  Location: [facility];  Service: Gastroenterology     ENDOSCOPIC RETROGRADE CHOLANGIOPANCREATOGRAM N/A 2/28/2020    Procedure: ENDOSCOPIC RETROGRADE CHOLANGIOPANCREATOGRAPHY, STENT REMOVAL, SLUDGE REMOVAL;  Surgeon: Dustin Betancourt MD;  Location: [facility];  Service: Gastroenterology     LAPAROSCOPIC CHOLECYSTECTOMY N/A 12/11/2019    Procedure: CHOLECYSTECTOMY, LAPAROSCOPIC;  Surgeon: Samia Portillo MD;  Location: [facility];  Service: General     [place] LAP,DIAGNOSTIC ABDOMEN N/A 2/20/2018    Procedure: DIAGNOSTIC LAPAROSCOPY, LYSIS OF  ADHESIONS;  Surgeon: Manish Lyons MD;  Location: [facility];  Service: Gynecology     XR ERCP BILIARY ONLY  12/18/2019     XR ERCP BILIARY ONLY  2/28/2020       CURRENT MEDICATIONS:    clindamycin (CLEOCIN) 300 MG capsule  cholecalciferol 50 MCG (2000 UT) CAPS  cyanocobalamin (VITAMIN B-12) 1000 MCG tablet  dimenhyDRINATE (DRAMAMINE) 50 MG CHEW  ondansetron (ZOFRAN-ODT) 8 MG ODT tab        ALLERGIES:  Allergies   Allergen Reactions     Penicillin G Anaphylaxis     Sumatriptan Hives, Swelling and Anxiety     Went to ED after taking 25mg for first time, no objective findings, likely not true allergy     Augmentin Hives     Iohexol Hives and Itching     Pt broke out in hives after CT IV contrast administration, when returning to the ER from CT.      Peanut Oil Unknown     Peanut [Peanut Oil] Swelling     Throat swelling       FAMILY HISTORY:  Family History   Problem Relation Age of Onset     Cancer Mother      Asthma Mother      Asthma Sister      Mental Illness Sister      Heart Disease Sister        SOCIAL HISTORY:  "  Social History     Socioeconomic History     Marital status: Single   Tobacco Use     Smoking status: Current Every Day Smoker     Packs/day: 0.50     Smokeless tobacco: Never Used   Substance and Sexual Activity     Alcohol use: No     Comment: Alcoholic Drinks/day: rare     Drug use: Not Currently     Sexual activity: Yes     Partners: Male       VITALS:  Patient Vitals for the past 24 hrs:   BP Temp Temp src Pulse Resp SpO2 Height Weight   06/17/22 2200 97/64 -- -- 81 19 -- -- --   06/17/22 2130 101/68 -- -- 77 23 100 % -- --   06/17/22 2115 96/66 -- -- 68 23 99 % -- --   06/17/22 2100 99/63 -- -- 69 24 100 % -- --   06/17/22 2030 94/66 -- -- 76 21 -- -- --   06/17/22 2015 103/67 -- -- 73 23 100 % -- --   06/17/22 1945 110/72 -- -- 68 19 100 % -- --   06/17/22 1930 102/63 -- -- 72 23 100 % -- --   06/17/22 1915 108/65 -- -- 80 24 100 % -- --   06/17/22 1910 101/72 -- -- 71 -- 100 % -- --   06/17/22 1905 115/82 -- -- 84 -- 100 % -- --   06/17/22 1811 112/63 99.1  F (37.3  C) Temporal 106 16 98 % 1.651 m (5' 5\") 53.1 kg (117 lb)       PHYSICAL EXAM    VITAL SIGNS: BP 97/64   Pulse 81   Temp 99.1  F (37.3  C) (Temporal)   Resp 19   Ht 1.651 m (5' 5\")   Wt 53.1 kg (117 lb)   LMP 06/16/2022   SpO2 100%   BMI 19.47 kg/m    General Appearance: Frail appearing; Alert, cooperative, normal speech and facial symmetry, appears stated age, the patient does not appear in distress  Head:  Normocephalic, without obvious abnormality, atraumatic  Eyes: Conjunctiva/corneas clear, EOM's intact, no nystagmus, PERRL  ENT:  Lips, mucosa, and tongue normal; teeth and gums normal, no pharyngeal inflammation, no dysphonia or difficulty swallowing, membranes are moist without pallor  Cardio:  Regular rate and rhythm, S1 and S2 normal, no murmur, rub    or gallop, 2+ pulses symmetric in all extremities  Pulm:  Clear to auscultation bilaterally, respirations unlabored with no accessory muscle use  Abdomen:  Abdomen is soft, " non-distended with no tenderness to palpation, rebound tenderness, or guarding.   Extremities:  Extremities normal, there is no tenderness to palpation , atraumatic, no cyanosis or edema, full function and range of motion, pulses equal in all extremities, normal cap refill, no joint swelling  Skin:  Skin color appears normal; no rashes or lesions noted  Neuro: Patient is awake, alert, and responsive to voice. No gross motor weaknesses or sensory loss; moves all extremities.     LAB:  All pertinent labs reviewed and interpreted.  Labs Ordered and Resulted from Time of ED Arrival to Time of ED Departure   CBC WITH PLATELETS - Abnormal       Result Value    WBC Count 7.9      RBC Count 4.11      Hemoglobin 11.0 (*)     Hematocrit 34.9 (*)     MCV 85      MCH 26.8      MCHC 31.5      RDW 15.5 (*)     Platelet Count 354     BASIC METABOLIC PANEL - Abnormal    Sodium 139      Potassium 3.7      Chloride 105      Carbon Dioxide (CO2) 26      Anion Gap 8      Urea Nitrogen 7 (*)     Creatinine 0.73      Calcium 9.7      Glucose 121      GFR Estimate >90     LACTIC ACID WHOLE BLOOD - Normal    Lactic Acid 0.7     D DIMER QUANTITATIVE - Normal    D-Dimer Quantitative 0.47     TROPONIN I - Normal    Troponin I <0.01     HCG QUALITATIVE URINE - Normal    hCG Urine Qualitative Negative         RADIOLOGY:  Reviewed all pertinent imaging. Please see official radiology report.  XR Chest Port 1 View   Final Result   IMPRESSION: Heart size is normal. Lungs are clear bilaterally. Mediastinum and visualized bony structures are unremarkable.      Head CT w/o contrast   Final Result   IMPRESSION:   1.  No acute intracranial abnormality.   2.  Paranasal sinus mucosal thickening, worst involving the left maxillary sinus with left maxillary sinus air-fluid level.   3.  Left maxillary tooth periapical abscess.          EKG:    Performed at: 19:18    I have independently reviewed and interpreted the EKG, along with the final read. EKG also  reviewed by Dr. Fox.    Ventricular rate 74 bpm  VT interval 124 ms  QRS duration 76 ms  QT/QTc 372/412 ms  P-R-T axes 62 87 64    Impression: Sinus rhythm    Lyndsey Tyler PA-C  Emergency Medicine  [facility] EMERGENCY DEPARTMENT  [facility] [facility] [id number] [phone]  Dept: [phone]       Lyndsey Tyler PA-C  06/17/22 2315

## 2022-06-18 NOTE — TELEPHONE ENCOUNTER
"Patient calling about an abcess tooth that was identified in the ER yesterday. She was started on an antibiotic yesterday, and is asking how long she needs to be on the antibiotic before the infection with stop. Patient advised to stay on the antibiotic, and to make an appointment with her dentist asap for next week.   she expressed understanding.    Faby Thurston RN   [facility] Nurse Advisor    Reason for Disposition    Toothache present < 24 hours    Additional Information    Negative: Shock suspected (e.g., cold/pale/clammy skin, too weak to stand, low BP, rapid pulse)    Negative: [1] Similar pain previously AND [2] it was from \"heart attack\"    Negative: [1] Similar pain previously AND [2] it was from \"angina\" AND [3] not relieved by nitroglycerin    Negative: Sounds like a life-threatening emergency to the triager    Negative: Chest pain    Negative: Toothache followed tooth injury    Negative: Toothache or mouth pain after tooth extraction    Negative: Canker sore (i.e., aphthous ulcer)    Negative: Tongue is very swollen and tender    Negative: [1] Face is swollen AND [2] fever    Negative: Patient sounds very sick or weak to the triager    Negative: [1] SEVERE pain (e.g., excruciating, unable to do any normal activities) AND [2] not improved 2 hours after pain medicine    Negative: Face is very swollen    Negative: Fever    Negative: [1] Face is swollen AND [2] no fever    Negative: Toothache present > 24 hours    Negative: [1] Toothache AND [2] intermittent AND [3] brought on by hot or cold liquids    Negative: Brown cavity visible in the painful tooth    Negative: Cracked or chipped tooth    Negative: Red or yellow lump present at the gum line of the painful tooth    Negative: Lost crown    Negative: Lost filling    Negative: Broken braces wire or end of braces wire is jabbing into gum, cheek, or tongue    Protocols used: TOOTHACHE-A-AH      "

## 2022-06-18 NOTE — DISCHARGE INSTRUCTIONS
You were seen here today for evaluation of multiple symptoms.  Your lab work today is very reassuring with no evidence of infection in your blood, heart attack, or blood clot.  Your chest x-ray was normal.  The CT scan of your head is generally unremarkable but it appears you may have an abscess in one of your teeth.  I will prescribe you antibiotics to treat that.  Take with food to prevent upset stomach.    You may take Tylenol and ibuprofen for pain/fever, do not exceed 4000 mg of Tylenol per day or 3200 mg ofibuprofen per day.    Follow-up with your primary care provider for recheck.  Return here for any new or worsening symptoms like severe pain, fever, persistent vomiting, worsening chest pain or shortness of breath, passing out, or any other symptoms that concern you.

## 2022-06-19 LAB
ATRIAL RATE - MUSE: 74 BPM
DIASTOLIC BLOOD PRESSURE - MUSE: 65 MMHG
INTERPRETATION ECG - MUSE: NORMAL
P AXIS - MUSE: 62 DEGREES
PR INTERVAL - MUSE: 124 MS
QRS DURATION - MUSE: 76 MS
QT - MUSE: 372 MS
QTC - MUSE: 412 MS
R AXIS - MUSE: 87 DEGREES
SYSTOLIC BLOOD PRESSURE - MUSE: 108 MMHG
T AXIS - MUSE: 64 DEGREES
VENTRICULAR RATE- MUSE: 74 BPM

## 2022-06-30 ENCOUNTER — HOSPITAL ENCOUNTER (EMERGENCY)
Facility: HOSPITAL | Age: 27
Discharge: LEFT BEFORE DISCHARGE | End: 2022-06-30
Payer: COMMERCIAL

## 2022-06-30 VITALS
DIASTOLIC BLOOD PRESSURE: 80 MMHG | RESPIRATION RATE: 20 BRPM | HEART RATE: 83 BPM | TEMPERATURE: 100.3 F | SYSTOLIC BLOOD PRESSURE: 123 MMHG | OXYGEN SATURATION: 98 %

## 2022-06-30 RX ORDER — SODIUM CHLORIDE 9 MG/ML
INJECTION, SOLUTION INTRAVENOUS CONTINUOUS
Status: DISCONTINUED | OUTPATIENT
Start: 2022-06-30 | End: 2022-06-30 | Stop reason: HOSPADM

## 2022-06-30 NOTE — ED TRIAGE NOTES
"Patient states chest pain and shortness of breath, states \"I think i'm having an allergic reaction\" has been in here for this after iron infusions before, last one being last Monday. Also having some right leg pain. Was given a methylprednisone pack and started that this morning.        "

## 2022-06-30 NOTE — ED NOTES
ED Provider In Triage Note  [facility]  Encounter Date: Jun 30, 2022    Chief Complaint   Patient presents with     Chest Pain     Shortness of Breath       Brief HPI:   Renata Staton is a 26 year old female presenting to the Emergency Department with a chief complaint of chest pain, shortness of breath.  Patient states that she received an iron infusion on Monday and shortly thereafter developed the symptoms of chest pain or shortness of breath.  Was treated with a Medrol Dosepak and that was helping for 2 days but her symptoms returned yesterday and now the steroids are not helping.  She otherwise denies any fevers, change in bowel or bladder habits, blood in her urine or stools.  Is being followed by [facility].    Brief Physical Exam:  /80   Pulse 83   Temp 100.3  F (37.9  C)   Resp 20   LMP 06/16/2022   SpO2 98%   General: Non-toxic appearing  HEENT: Atraumatic  Resp: No respiratory distress  Abdomen: Non-peritoneal  Neuro: Alert, oriented, answers questions appropriately  Psych: Behavior appropriate      Plan Initiated in Triage:  Labs, xray ordered      PIT Dispo:   Return to lobby while awaiting workup and ED bed availability    NICOLAS PÉREZ MD on 6/30/2022 at 5:19 PM    Patient was evaluated by the Physician in Triage due to a limitation of available rooms in the Emergency Department. A plan of care was discussed based on the information obtained on the initial evaluation and patient was consuled to return back to the Emergency Department lobby after this initial evalutaiton until results were obtained or a room became available in the Emergency Department. Patient was counseled not to leave prior to receiving the results of their workup.     NICOLAS PÉREZ MD  [facility] EMERGENCY DEPARTMENT  [address] [place] [id number]  [phone]     Nicolas Pérez MD  06/30/22 7285

## 2022-07-03 ENCOUNTER — HOSPITAL ENCOUNTER (EMERGENCY)
Facility: CLINIC | Age: 27
Discharge: HOME | End: 2022-07-03
Attending: EMERGENCY MEDICINE | Admitting: EMERGENCY MEDICINE
Payer: COMMERCIAL

## 2022-07-03 VITALS
TEMPERATURE: 98.4 F | HEART RATE: 58 BPM | WEIGHT: 115 LBS | OXYGEN SATURATION: 99 % | DIASTOLIC BLOOD PRESSURE: 74 MMHG | BODY MASS INDEX: 19.16 KG/M2 | RESPIRATION RATE: 19 BRPM | SYSTOLIC BLOOD PRESSURE: 107 MMHG | HEIGHT: 65 IN

## 2022-07-03 DIAGNOSIS — R07.89 ATYPICAL CHEST PAIN: ICD-10-CM

## 2022-07-03 DIAGNOSIS — R06.02 SHORTNESS OF BREATH: ICD-10-CM

## 2022-07-03 LAB
ALBUMIN SERPL-MCNC: 3.9 G/DL (ref 3.5–5)
ALP SERPL-CCNC: 92 U/L (ref 45–120)
ALT SERPL W P-5'-P-CCNC: 33 U/L (ref 0–45)
ANION GAP SERPL CALCULATED.3IONS-SCNC: 10 MMOL/L (ref 5–18)
AST SERPL W P-5'-P-CCNC: 15 U/L (ref 0–40)
ATRIAL RATE - MUSE: 58 BPM
BASOPHILS # BLD AUTO: 0 10E3/UL (ref 0–0.2)
BASOPHILS NFR BLD AUTO: 0 %
BILIRUB DIRECT SERPL-MCNC: 0.1 MG/DL
BILIRUB SERPL-MCNC: 0.3 MG/DL (ref 0–1)
BUN SERPL-MCNC: 9 MG/DL (ref 8–22)
CALCIUM SERPL-MCNC: 9.6 MG/DL (ref 8.5–10.5)
CHLORIDE BLD-SCNC: 106 MMOL/L (ref 98–107)
CO2 SERPL-SCNC: 22 MMOL/L (ref 22–31)
CREAT SERPL-MCNC: 0.64 MG/DL (ref 0.6–1.1)
DIASTOLIC BLOOD PRESSURE - MUSE: 68 MMHG
EOSINOPHIL # BLD AUTO: 0 10E3/UL (ref 0–0.7)
EOSINOPHIL NFR BLD AUTO: 0 %
ERYTHROCYTE [DISTWIDTH] IN BLOOD BY AUTOMATED COUNT: 17.6 % (ref 10–15)
GFR SERPL CREATININE-BSD FRML MDRD: >90 ML/MIN/1.73M2
GLUCOSE BLD-MCNC: 107 MG/DL (ref 70–125)
HCT VFR BLD AUTO: 37 % (ref 35–47)
HGB BLD-MCNC: 12.4 G/DL (ref 11.7–15.7)
IMM GRANULOCYTES # BLD: 0 10E3/UL
IMM GRANULOCYTES NFR BLD: 0 %
INTERPRETATION ECG - MUSE: NORMAL
LIPASE SERPL-CCNC: 20 U/L (ref 0–52)
LYMPHOCYTES # BLD AUTO: 1.7 10E3/UL (ref 0.8–5.3)
LYMPHOCYTES NFR BLD AUTO: 17 %
MAGNESIUM SERPL-MCNC: 1.8 MG/DL (ref 1.8–2.6)
MCH RBC QN AUTO: 28.1 PG (ref 26.5–33)
MCHC RBC AUTO-ENTMCNC: 33.5 G/DL (ref 31.5–36.5)
MCV RBC AUTO: 84 FL (ref 78–100)
MONOCYTES # BLD AUTO: 0.6 10E3/UL (ref 0–1.3)
MONOCYTES NFR BLD AUTO: 6 %
NEUTROPHILS # BLD AUTO: 7.8 10E3/UL (ref 1.6–8.3)
NEUTROPHILS NFR BLD AUTO: 77 %
NRBC # BLD AUTO: 0 10E3/UL
NRBC BLD AUTO-RTO: 0 /100
P AXIS - MUSE: 39 DEGREES
PLATELET # BLD AUTO: 362 10E3/UL (ref 150–450)
POTASSIUM BLD-SCNC: 3.7 MMOL/L (ref 3.5–5)
PR INTERVAL - MUSE: 118 MS
PROT SERPL-MCNC: 7.4 G/DL (ref 6–8)
QRS DURATION - MUSE: 72 MS
QT - MUSE: 400 MS
QTC - MUSE: 392 MS
R AXIS - MUSE: 77 DEGREES
RBC # BLD AUTO: 4.42 10E6/UL (ref 3.8–5.2)
SODIUM SERPL-SCNC: 138 MMOL/L (ref 136–145)
SYSTOLIC BLOOD PRESSURE - MUSE: 108 MMHG
T AXIS - MUSE: 57 DEGREES
VENTRICULAR RATE- MUSE: 58 BPM
WBC # BLD AUTO: 10.2 10E3/UL (ref 4–11)

## 2022-07-03 PROCEDURE — 99284 EMERGENCY DEPT VISIT MOD MDM: CPT

## 2022-07-03 PROCEDURE — 82248 BILIRUBIN DIRECT: CPT | Performed by: EMERGENCY MEDICINE

## 2022-07-03 PROCEDURE — 36415 COLL VENOUS BLD VENIPUNCTURE: CPT | Performed by: EMERGENCY MEDICINE

## 2022-07-03 PROCEDURE — 93005 ELECTROCARDIOGRAM TRACING: CPT | Performed by: EMERGENCY MEDICINE

## 2022-07-03 PROCEDURE — 83735 ASSAY OF MAGNESIUM: CPT | Performed by: EMERGENCY MEDICINE

## 2022-07-03 PROCEDURE — 83690 ASSAY OF LIPASE: CPT | Performed by: EMERGENCY MEDICINE

## 2022-07-03 PROCEDURE — 85025 COMPLETE CBC W/AUTO DIFF WBC: CPT | Performed by: EMERGENCY MEDICINE

## 2022-07-03 ASSESSMENT — ENCOUNTER SYMPTOMS
SHORTNESS OF BREATH: 1
DIZZINESS: 1
NAUSEA: 1
CHILLS: 1
COUGH: 1

## 2022-07-03 NOTE — ED PROVIDER NOTES
EMERGENCY DEPARTMENT ENCOUNTER      NAME: Renata Staton  AGE: 26 year old female  YOB: 1995  MRN: [id number]  EVALUATION DATE & TIME: 7/3/2022  1:17 AM    PCP: Andria Hernández    ED PROVIDER: Janay Guerrero MD      Chief Complaint   Patient presents with     Shortness of Breath         FINAL IMPRESSION:  1. Shortness of breath    2. Atypical chest pain          ED COURSE & MEDICAL DECISION MAKIN:19 AM I met with the patient, obtained history, performed an initial exam, and discussed options and plan for diagnostics and treatment here in the ED.   2:14 AM I rechecked and updated the patient     Pertinent Labs & Imaging studies reviewed. (See chart for details)  26 year old female presents to the Emergency Department for evaluation of chest pain, shortness of breath, dizziness.  Patient reports that she has been seen by her primary care provider, GI specialist, seen in the emergency department for similar symptoms.  Over the last few hours, she started to become very short of breath which exacerbated her chest discomfort.  Per chart review, the patient has had work-ups previously, with troponin, D-dimer, chest x-ray.  She has a CT scan of her abdomen pelvis with contrast scheduled with GI this coming week.  She does have significant allergies including allergy to contrast dye and is going to be pretreated.  ECG today with no acute ischemic changes.  Laboratory studies within normal limits.  Given her recent chest x-rays with similar symptoms this was not repeated.  I discussed with her given her symptoms, she likely is having associated increased stress and panic attacks.  This is likely what happened this evening.  I discussed with her measures at home to help with increased stress and anxiety, and would recommend close follow-up with her primary care provider.  She is comfortable with this plan.    At the conclusion of the encounter I discussed the results of all of the tests and the  "disposition. The questions were answered. The patient or family acknowledged understanding and was agreeable with the care plan.       MEDICATIONS GIVEN IN THE EMERGENCY:  Medications - No data to display    NEW PRESCRIPTIONS STARTED AT TODAY'S ER VISIT  New Prescriptions    No medications on file          =================================================================    HPI    Patient information was obtained from: Patient    Use of : N/A         Renata Staton is a 26 year old female with a pertinent history of iron deficiency, acute gallstone pancreatitis, B12 deficiency, and cholelithiasis and acute cholecystitis with obstruction,  who presents to this ED via walk-in for evaluation of shortness of breath.    Patient reports she was diagnosed with iron deficiency anemia a couple of months ago and states she is sick of the symptoms, which include dizziness, shortness of breath, leg pain, headaches, and fatigue. She notes she has taken two iron infusions that gave her an allergic reaction. Patient is feeling worse since she started getting treated for anemia and states she has been at the hospital too many times. She reports that in the last 3 hours, patient has been experiencing shortness of breath as \"air hunger\" after putting her child to bed. The shortness of breath is still present with a sharp left sided chest pain and a surrounding dull pain. Patient has been taking methylprednisolone for her anemia, last dose taken 2 hours, 4 mg per day. Patient endorses chills and a cough. Endorses drinking fluids, but it makes her nauseous, and states she has been losing weight. She notes GI set up a CT abdomen for Tuesday, but she couldn't wait. Patient denies any other current complaints.    Social: smoker, trying to cut back.      REVIEW OF SYSTEMS   Review of Systems   Constitutional: Positive for chills.   Respiratory: Positive for cough and shortness of breath.    Gastrointestinal: Positive for " nausea.   Musculoskeletal:        Positive for leg pain.   Neurological: Positive for dizziness.   All other systems reviewed and are negative.       PAST MEDICAL HISTORY:  Past Medical History:   Diagnosis Date     Anxiety      Chronic abdominal pain      Chronic pelvic pain in female      Depression      History of anesthesia complications     family issues slow to wake up after surgery     Migraine        PAST SURGICAL HISTORY:  Past Surgical History:   Procedure Laterality Date     ENDOSCOPIC RETROGRADE CHOLANGIOPANCREATOGRAM N/A 12/18/2019    Procedure: ENDOSCOPIC RETROGRADE CHOLANGIOPANCREATOGRAPHY with billary sphinctomy and stone extraction and  billary stent placement with epinephrine injection ;  Surgeon: Dustin Betancourt MD;  Location: [facility];  Service: Gastroenterology     ENDOSCOPIC RETROGRADE CHOLANGIOPANCREATOGRAM N/A 2/28/2020    Procedure: ENDOSCOPIC RETROGRADE CHOLANGIOPANCREATOGRAPHY, STENT REMOVAL, SLUDGE REMOVAL;  Surgeon: Dustin Betancourt MD;  Location: [facility];  Service: Gastroenterology     LAPAROSCOPIC CHOLECYSTECTOMY N/A 12/11/2019    Procedure: CHOLECYSTECTOMY, LAPAROSCOPIC;  Surgeon: Samia Portillo MD;  Location: [facility];  Service: General     NM LAP,DIAGNOSTIC ABDOMEN N/A 2/20/2018    Procedure: DIAGNOSTIC LAPAROSCOPY, LYSIS OF  ADHESIONS;  Surgeon: Manish Lyons MD;  Location: [facility];  Service: Gynecology     XR ERCP BILIARY ONLY  12/18/2019     XR ERCP BILIARY ONLY  2/28/2020           CURRENT MEDICATIONS:    cholecalciferol 50 MCG (2000 UT) CAPS  cyanocobalamin (VITAMIN B-12) 1000 MCG tablet  dimenhyDRINATE (DRAMAMINE) 50 MG CHEW  ondansetron (ZOFRAN-ODT) 8 MG ODT tab        ALLERGIES:  Allergies   Allergen Reactions     Penicillin G Anaphylaxis     Sumatriptan Hives, Swelling and Anxiety     Went to ED after taking 25mg for first time, no objective findings, likely not true allergy     Augmentin Hives     Iohexol Hives and  "Itching     Pt broke out in hives after CT IV contrast administration, when returning to the ER from CT.      Peanut Oil Unknown     Peanut [Peanut Oil] Swelling     Throat swelling       FAMILY HISTORY:  Family History   Problem Relation Age of Onset     Cancer Mother      Asthma Mother      Asthma Sister      Mental Illness Sister      Heart Disease Sister        SOCIAL HISTORY:   Social History     Socioeconomic History     Marital status: Single   Tobacco Use     Smoking status: Current Every Day Smoker     Packs/day: 0.50     Smokeless tobacco: Never Used   Substance and Sexual Activity     Alcohol use: No     Comment: Alcoholic Drinks/day: rare     Drug use: Not Currently     Sexual activity: Yes     Partners: Male       VITALS:  /74   Pulse 58   Temp 98.4  F (36.9  C) (Oral)   Resp 19   Ht 1.651 m (5' 5\")   Wt 52.2 kg (115 lb)   LMP 06/16/2022   SpO2 99%   BMI 19.14 kg/m      PHYSICAL EXAM    Constitutional: Well developed, Well nourished, NAD  HENT: Normocephalic, Atraumatic, Bilateral external ears normal, Oropharynx normal, mucous membranes moist, Nose normal.   Neck- Normal range of motion, No tenderness, Supple, No stridor.  Eyes: PERRL, EOMI, Conjunctiva normal, No discharge.   Respiratory: Normal breath sounds, No respiratory distress  Cardiovascular: Normal heart rate, Regular rhythm  GI: Bowel sounds normal, Soft, No tenderness,   Musculoskeletal: No edema. Good range of motion in all major joints. No tenderness to palpation or major deformities noted.   Integument: Warm, Dry, No erythema, No rash  Neurologic: Alert & oriented x 3, Normal motor function, Normal sensory function, No focal deficits noted. Normal gait.   Psychiatric: Anxious, tearful     LAB:  All pertinent labs reviewed and interpreted.  Results for orders placed or performed during the hospital encounter of 07/03/22   Basic metabolic panel   Result Value Ref Range    Sodium 138 136 - 145 mmol/L    Potassium 3.7 3.5 - 5.0 " mmol/L    Chloride 106 98 - 107 mmol/L    Carbon Dioxide (CO2) 22 22 - 31 mmol/L    Anion Gap 10 5 - 18 mmol/L    Urea Nitrogen 9 8 - 22 mg/dL    Creatinine 0.64 0.60 - 1.10 mg/dL    Calcium 9.6 8.5 - 10.5 mg/dL    Glucose 107 70 - 125 mg/dL    GFR Estimate >90 >60 mL/min/1.73m2   Result Value Ref Range    Magnesium 1.8 1.8 - 2.6 mg/dL   Hepatic function panel   Result Value Ref Range    Bilirubin Total 0.3 0.0 - 1.0 mg/dL    Bilirubin Direct 0.1 <=0.5 mg/dL    Protein Total 7.4 6.0 - 8.0 g/dL    Albumin 3.9 3.5 - 5.0 g/dL    Alkaline Phosphatase 92 45 - 120 U/L    AST 15 0 - 40 U/L    ALT 33 0 - 45 U/L   Result Value Ref Range    Lipase 20 0 - 52 U/L   CBC with platelets and differential   Result Value Ref Range    WBC Count 10.2 4.0 - 11.0 10e3/uL    RBC Count 4.42 3.80 - 5.20 10e6/uL    Hemoglobin 12.4 11.7 - 15.7 g/dL    Hematocrit 37.0 35.0 - 47.0 %    MCV 84 78 - 100 fL    MCH 28.1 26.5 - 33.0 pg    MCHC 33.5 31.5 - 36.5 g/dL    RDW 17.6 (H) 10.0 - 15.0 %    Platelet Count 362 150 - 450 10e3/uL    % Neutrophils 77 %    % Lymphocytes 17 %    % Monocytes 6 %    % Eosinophils 0 %    % Basophils 0 %    % Immature Granulocytes 0 %    NRBCs per 100 WBC 0 <1 /100    Absolute Neutrophils 7.8 1.6 - 8.3 10e3/uL    Absolute Lymphocytes 1.7 0.8 - 5.3 10e3/uL    Absolute Monocytes 0.6 0.0 - 1.3 10e3/uL    Absolute Eosinophils 0.0 0.0 - 0.7 10e3/uL    Absolute Basophils 0.0 0.0 - 0.2 10e3/uL    Absolute Immature Granulocytes 0.0 <=0.4 10e3/uL    Absolute NRBCs 0.0 10e3/uL       RADIOLOGY:  Reviewed all pertinent imaging. Please see official radiology report.  No orders to display       EKG:    Performed at: 01:51    Impression: sinus bradycardia    Rate: 58 bpm  Rhythm: sinus bradycardia    AZ Interval: 118 ms  QRS Interval: 72 ms  QTc Interval: 392 ms  ST Changes: no acute ischemia  Comparison: no significant change from 6/17/22    I have independently reviewed and interpreted the EKG(s) documented above.      Loren AREVALO  Jay, am serving as a scribe to document services personally performed by Janay Guerrero, based on my observation and the provider's statements to me. I, Janay Guerrero MD, attest that Loren Thompson is acting in a scribe capacity, has observed my performance of the services and has documented them in accordance with my direction.    Janay Guerrero MD  Emergency Medicine  [facility] EMERGENCY ROOM  [address] [facility] [id number] [phone]     Janay Guerrero MD  07/03/22 0223

## 2022-07-03 NOTE — ED TRIAGE NOTES
"Pt arrives c/o multiple complaints. She says that she is \"sick of all her symptoms\" She has been in the ED a few times this past week for iron deficient symptoms. With SOB, leg pain, and some chest pain.      "

## 2022-07-05 ENCOUNTER — HOSPITAL ENCOUNTER (OUTPATIENT)
Dept: CT IMAGING | Facility: HOSPITAL | Age: 27
Discharge: HOME | End: 2022-07-05
Attending: PHYSICIAN ASSISTANT | Admitting: PHYSICIAN ASSISTANT
Payer: COMMERCIAL

## 2022-07-05 DIAGNOSIS — R10.31 RLQ ABDOMINAL PAIN: ICD-10-CM

## 2022-07-05 PROCEDURE — 74177 CT ABD & PELVIS W/CONTRAST: CPT

## 2022-07-05 PROCEDURE — 255N000002 HC RX 255 OP 636: Performed by: PHYSICIAN ASSISTANT

## 2022-07-05 RX ADMIN — IOHEXOL 100 ML: 350 INJECTION, SOLUTION INTRAVENOUS at 15:15

## 2022-07-10 LAB
ALBUMIN UR-MCNC: NEGATIVE MG/DL
ANION GAP SERPL CALCULATED.3IONS-SCNC: 8 MMOL/L (ref 5–18)
APPEARANCE UR: ABNORMAL
BASOPHILS # BLD AUTO: 0 10E3/UL (ref 0–0.2)
BASOPHILS NFR BLD AUTO: 0 %
BILIRUB UR QL STRIP: NEGATIVE
BUN SERPL-MCNC: 10 MG/DL (ref 8–22)
CALCIUM SERPL-MCNC: 10.1 MG/DL (ref 8.5–10.5)
CHLORIDE BLD-SCNC: 106 MMOL/L (ref 98–107)
CO2 SERPL-SCNC: 26 MMOL/L (ref 22–31)
COLOR UR AUTO: ABNORMAL
CREAT SERPL-MCNC: 0.73 MG/DL (ref 0.6–1.1)
EOSINOPHIL # BLD AUTO: 0.1 10E3/UL (ref 0–0.7)
EOSINOPHIL NFR BLD AUTO: 1 %
ERYTHROCYTE [DISTWIDTH] IN BLOOD BY AUTOMATED COUNT: 16.6 % (ref 10–15)
GFR SERPL CREATININE-BSD FRML MDRD: >90 ML/MIN/1.73M2
GLUCOSE BLD-MCNC: 80 MG/DL (ref 70–125)
GLUCOSE UR STRIP-MCNC: NEGATIVE MG/DL
HCG SERPL-ACNC: <2 MLU/ML (ref 0–4)
HCT VFR BLD AUTO: 41 % (ref 35–47)
HGB BLD-MCNC: 13.6 G/DL (ref 11.7–15.7)
HGB UR QL STRIP: ABNORMAL
HOLD SPECIMEN: NORMAL
IMM GRANULOCYTES # BLD: 0 10E3/UL
IMM GRANULOCYTES NFR BLD: 0 %
KETONES UR STRIP-MCNC: NEGATIVE MG/DL
LEUKOCYTE ESTERASE UR QL STRIP: ABNORMAL
LYMPHOCYTES # BLD AUTO: 3.6 10E3/UL (ref 0.8–5.3)
LYMPHOCYTES NFR BLD AUTO: 35 %
MCH RBC QN AUTO: 28.9 PG (ref 26.5–33)
MCHC RBC AUTO-ENTMCNC: 33.2 G/DL (ref 31.5–36.5)
MCV RBC AUTO: 87 FL (ref 78–100)
MONOCYTES # BLD AUTO: 0.8 10E3/UL (ref 0–1.3)
MONOCYTES NFR BLD AUTO: 8 %
NEUTROPHILS # BLD AUTO: 5.8 10E3/UL (ref 1.6–8.3)
NEUTROPHILS NFR BLD AUTO: 56 %
NITRATE UR QL: NEGATIVE
NRBC # BLD AUTO: 0 10E3/UL
NRBC BLD AUTO-RTO: 0 /100
PH UR STRIP: 8 [PH] (ref 5–7)
PLATELET # BLD AUTO: 297 10E3/UL (ref 150–450)
POTASSIUM BLD-SCNC: 3.7 MMOL/L (ref 3.5–5)
RBC # BLD AUTO: 4.7 10E6/UL (ref 3.8–5.2)
RBC URINE: 5 /HPF
SODIUM SERPL-SCNC: 140 MMOL/L (ref 136–145)
SP GR UR STRIP: 1.02 (ref 1–1.03)
SQUAMOUS EPITHELIAL: 1 /HPF
UROBILINOGEN UR STRIP-MCNC: <2 MG/DL
WBC # BLD AUTO: 10.3 10E3/UL (ref 4–11)
WBC CLUMPS #/AREA URNS HPF: PRESENT /HPF
WBC URINE: 0 /HPF

## 2022-07-10 PROCEDURE — 96375 TX/PRO/DX INJ NEW DRUG ADDON: CPT

## 2022-07-10 PROCEDURE — 85025 COMPLETE CBC W/AUTO DIFF WBC: CPT | Performed by: EMERGENCY MEDICINE

## 2022-07-10 PROCEDURE — 82248 BILIRUBIN DIRECT: CPT | Performed by: EMERGENCY MEDICINE

## 2022-07-10 PROCEDURE — 83690 ASSAY OF LIPASE: CPT | Performed by: EMERGENCY MEDICINE

## 2022-07-10 PROCEDURE — 82310 ASSAY OF CALCIUM: CPT | Performed by: EMERGENCY MEDICINE

## 2022-07-10 PROCEDURE — 81001 URINALYSIS AUTO W/SCOPE: CPT | Performed by: STUDENT IN AN ORGANIZED HEALTH CARE EDUCATION/TRAINING PROGRAM

## 2022-07-10 PROCEDURE — 81001 URINALYSIS AUTO W/SCOPE: CPT | Performed by: EMERGENCY MEDICINE

## 2022-07-10 PROCEDURE — 36415 COLL VENOUS BLD VENIPUNCTURE: CPT | Performed by: EMERGENCY MEDICINE

## 2022-07-10 PROCEDURE — 84702 CHORIONIC GONADOTROPIN TEST: CPT | Performed by: EMERGENCY MEDICINE

## 2022-07-10 PROCEDURE — 96374 THER/PROPH/DIAG INJ IV PUSH: CPT

## 2022-07-10 PROCEDURE — 99284 EMERGENCY DEPT VISIT MOD MDM: CPT | Mod: 25

## 2022-07-11 ENCOUNTER — APPOINTMENT (OUTPATIENT)
Dept: ULTRASOUND IMAGING | Facility: HOSPITAL | Age: 27
End: 2022-07-11
Attending: EMERGENCY MEDICINE
Payer: COMMERCIAL

## 2022-07-11 ENCOUNTER — HOSPITAL ENCOUNTER (EMERGENCY)
Facility: HOSPITAL | Age: 27
Discharge: HOME | End: 2022-07-11
Attending: EMERGENCY MEDICINE | Admitting: EMERGENCY MEDICINE
Payer: COMMERCIAL

## 2022-07-11 ENCOUNTER — APPOINTMENT (OUTPATIENT)
Dept: CT IMAGING | Facility: HOSPITAL | Age: 27
End: 2022-07-11
Attending: EMERGENCY MEDICINE
Payer: COMMERCIAL

## 2022-07-11 VITALS
TEMPERATURE: 99.3 F | HEIGHT: 65 IN | WEIGHT: 115 LBS | SYSTOLIC BLOOD PRESSURE: 104 MMHG | DIASTOLIC BLOOD PRESSURE: 67 MMHG | BODY MASS INDEX: 19.16 KG/M2 | HEART RATE: 78 BPM | OXYGEN SATURATION: 98 % | RESPIRATION RATE: 16 BRPM

## 2022-07-11 DIAGNOSIS — N73.0 PID (ACUTE PELVIC INFLAMMATORY DISEASE): ICD-10-CM

## 2022-07-11 LAB
ALBUMIN SERPL-MCNC: 3.9 G/DL (ref 3.5–5)
ALP SERPL-CCNC: 84 U/L (ref 45–120)
ALT SERPL W P-5'-P-CCNC: 16 U/L (ref 0–45)
AST SERPL W P-5'-P-CCNC: 16 U/L (ref 0–40)
BILIRUB DIRECT SERPL-MCNC: <0.1 MG/DL
BILIRUB SERPL-MCNC: 0.3 MG/DL (ref 0–1)
CLUE CELLS: ABNORMAL
LIPASE SERPL-CCNC: 33 U/L (ref 0–52)
PROT SERPL-MCNC: 7.6 G/DL (ref 6–8)
TRICHOMONAS, WET PREP: ABNORMAL
WBC'S/HIGH POWER FIELD, WET PREP: ABNORMAL
YEAST, WET PREP: ABNORMAL

## 2022-07-11 PROCEDURE — 74176 CT ABD & PELVIS W/O CONTRAST: CPT

## 2022-07-11 PROCEDURE — 250N000011 HC RX IP 250 OP 636: Performed by: EMERGENCY MEDICINE

## 2022-07-11 PROCEDURE — 250N000013 HC RX MED GY IP 250 OP 250 PS 637: Performed by: EMERGENCY MEDICINE

## 2022-07-11 PROCEDURE — 87210 SMEAR WET MOUNT SALINE/INK: CPT | Performed by: EMERGENCY MEDICINE

## 2022-07-11 PROCEDURE — 87491 CHLMYD TRACH DNA AMP PROBE: CPT | Performed by: EMERGENCY MEDICINE

## 2022-07-11 PROCEDURE — 76830 TRANSVAGINAL US NON-OB: CPT

## 2022-07-11 RX ORDER — DOXYCYCLINE 100 MG/1
100 CAPSULE ORAL 2 TIMES DAILY
Qty: 28 CAPSULE | Refills: 0 | Status: SHIPPED | OUTPATIENT
Start: 2022-07-11 | End: 2022-07-25

## 2022-07-11 RX ORDER — METRONIDAZOLE 500 MG/1
500 TABLET ORAL 2 TIMES DAILY
Qty: 28 TABLET | Refills: 1 | Status: SHIPPED | OUTPATIENT
Start: 2022-07-11 | End: 2022-07-25

## 2022-07-11 RX ORDER — ONDANSETRON 2 MG/ML
4 INJECTION INTRAMUSCULAR; INTRAVENOUS ONCE
Status: COMPLETED | OUTPATIENT
Start: 2022-07-11 | End: 2022-07-11

## 2022-07-11 RX ORDER — KETOROLAC TROMETHAMINE 15 MG/ML
15 INJECTION, SOLUTION INTRAMUSCULAR; INTRAVENOUS ONCE
Status: COMPLETED | OUTPATIENT
Start: 2022-07-11 | End: 2022-07-11

## 2022-07-11 RX ORDER — OXYCODONE HYDROCHLORIDE 5 MG/1
5 TABLET ORAL ONCE
Status: COMPLETED | OUTPATIENT
Start: 2022-07-11 | End: 2022-07-11

## 2022-07-11 RX ADMIN — OXYCODONE HYDROCHLORIDE 5 MG: 5 TABLET ORAL at 00:48

## 2022-07-11 RX ADMIN — ONDANSETRON 4 MG: 2 INJECTION INTRAMUSCULAR; INTRAVENOUS at 00:49

## 2022-07-11 RX ADMIN — KETOROLAC TROMETHAMINE 15 MG: 15 INJECTION, SOLUTION INTRAMUSCULAR; INTRAVENOUS at 00:49

## 2022-07-11 ASSESSMENT — ENCOUNTER SYMPTOMS
NAUSEA: 1
CONFUSION: 0
DIAPHORESIS: 1
DYSURIA: 0
ABDOMINAL PAIN: 1
BACK PAIN: 0
HEADACHES: 0
SHORTNESS OF BREATH: 0
FEVER: 1
BRUISES/BLEEDS EASILY: 0
FREQUENCY: 1

## 2022-07-11 NOTE — DISCHARGE INSTRUCTIONS
Recommend doxycycline twice a day for 14 days.  Recommend Flagyl twice a day for 14 days.  Follow-up with primary care doctor.  Urine culture is pending.  Ultrasound without evidence of ovarian cyst.  Recommend ibuprofen and Tylenol for pain.

## 2022-07-11 NOTE — ED TRIAGE NOTES
At 630pm, pt developed abd cramping,bloating and right abd pain that was and is 9/10. Pt feels rectal/vaginal pressure as well.  Pt then stared vaginal bleeding that ws brown ,then it turned to pink. She filled 3 pads quickly and states she is still bleeding  Quite a bit.  She feels this is not like her usual period.  Pt has an irregular menstral cycle--last time she had her period was around 5 weeks ago. Pt broke out in a cold sweat as this was happened and drenched her clothes . Pt dosent feel sweaty now. Nauseated.

## 2022-07-11 NOTE — ED PROVIDER NOTES
EMERGENCY DEPARTMENT ENCOUNTER      NAME: Renata Staton  AGE: 26 year old female  YOB: 1995  MRN: [id number]  EVALUATION DATE & TIME: No admission date for patient encounter.    PCP: Andria Hernández    ED PROVIDER: David Finch MD        Chief Complaint   Patient presents with     Vaginal Bleeding     Abdominal Pain         FINAL IMPRESSION:  1. PID (acute pelvic inflammatory disease)          ED COURSE & MEDICAL DECISION MAKING:    Pertinent Labs & Imaging studies reviewed. (See chart for details)  26 year old female presents to the Emergency Department for evaluation of vaginal discharge.  12:20 AM I introduced myself to the patient, obtained patient history, performed a physical exam, and discussed plan for ED workup including potential diagnostic laboratory/imaging studies and interventions. I wore goggles and N95.  3:42 AM Rechecked and updated the patient. We discussed the plan for discharge and the patient is agreeable. Reviewed supportive cares, symptomatic treatment, outpatient follow up, and reasons to return to the Emergency Department. Patient to be discharged by ED RN.     Patient has history of chronic pain has been seen by GI and had a CT scan recently that did not show evidence of appendicitis but I cannot visualize appendix either also seen by OB recently and diagnosed with a cyst presents with lower abdominal pain in addition to some vaginal discharge    Patient states that were about chlamydia or gonorrhea.    Differential includes ovarian cyst, ovarian torsion, renal colic, appendicitis, PID, ectopic pregnancy, endometriosis     Given Toradol, Zofran, oxycodone    Pelvic swabs show 4+ white blood cells but otherwise active vaginosis, trichomonas, yeast unlikely    Ultrasound without evidence of torsion or cyst.  CT abdomen without free fluid or appendicitis.  CT without contrast since patient states she got hives even with pretreatment with Benadryl on the recent CT she  "has    Will treat for possible PID with doxycycline and Flagyl    Urine culture pending and unlikely to cause the patient's symptoms    Recommend follow-up with primary care doctor          ED Course as of 07/11/22 0354   Sun Jul 10, 2022   2359 Pulse: 107   Mon Jul 11, 2022   0353 % Lymphocytes: 35   0353 WBC: 10.3   0353 hCG Quantitative: <2   0353 WBCs/high power field(!): 4+   0353 Trichomonas: Absent   0353 Yeast: Absent   0353 Clue cells: Absent   0354 GC pending       At the conclusion of the encounter I discussed the results of all of the tests and the disposition. The questions were answered. The patient or family acknowledged understanding and was agreeable with the care plan.       MEDICATIONS GIVEN IN THE EMERGENCY:  Medications   ketorolac (TORADOL) injection 15 mg (15 mg Intravenous Given 7/11/22 0049)   oxyCODONE (ROXICODONE) tablet 5 mg (5 mg Oral Given 7/11/22 0048)   ondansetron (ZOFRAN) injection 4 mg (4 mg Intravenous Given 7/11/22 0049)       NEW PRESCRIPTIONS STARTED AT TODAY'S ER VISIT  New Prescriptions    DOXYCYCLINE HYCLATE (VIBRAMYCIN) 100 MG CAPSULE    Take 1 capsule (100 mg) by mouth 2 times daily for 14 days    METRONIDAZOLE (FLAGYL) 500 MG TABLET    Take 1 tablet (500 mg) by mouth 2 times daily for 14 days          =================================================================    HPI    Triage note  \"At 630pm, pt developed abd cramping,bloating and right abd pain that was and is 9/10. Pt feels rectal/vaginal pressure as well.  Pt then stared vaginal bleeding that ws brown ,then it turned to pink. She filled 3 pads quickly and states she is still bleeding  Quite a bit.  She feels this is not like her usual period.  Pt has an irregular menstral cycle--last time she had her period was around 5 weeks ago. Pt broke out in a cold sweat as this was happened and drenched her clothes . Pt dosent feel sweaty now. Nauseated.      \"      Patient information was obtained from: Patient    Use of " : N/A        Renata Staton is a 26 year old female with a pertinent history of pancreatitis, elevated AST, kidney stone, and chronic pelvis pain who presents to this ED by walk in for evaluation of vaginal discharge.      Patient ate dinner around 7:00 PM and started feeling bloated and sharp pain. Patient then started noticing pink vaginal discharge that wouldn't stop. She also has lower abdominal pain. Patient still has her appendics. Patient has seen a GI doctor in the beginning of June for her abdominal pain and they ordered a CT of her abdomen. Patient has a history of ovarian cysts. Patient is sexually active and denies gonorrhea and chlamydia. Patient says the pain started on her right side and radiated across her stomach. Patient is very nauseas. Patient denies vaginal trauma or odors to her vaginal discharge. Patient has a fever and is diaphoretic. Patient takes tylenol, her last dose was at 6:00 PM. Patient denies beng on birth control. Patient had cholecystectomy. Patient denies dysuria. Patient says she has increase in urination. She says the pain feels like pancreatitis.     REVIEW OF SYSTEMS   Review of Systems   Constitutional: Positive for diaphoresis and fever.   HENT: Negative for drooling.    Respiratory: Negative for shortness of breath.    Cardiovascular: Negative for chest pain.   Gastrointestinal: Positive for abdominal pain (lower ) and nausea.   Endocrine: Negative for polyuria.   Genitourinary: Positive for frequency (increase) and vaginal discharge (pink discharge). Negative for dysuria and vaginal bleeding.   Musculoskeletal: Negative for back pain.   Skin: Negative for rash.   Allergic/Immunologic: Negative for immunocompromised state.   Neurological: Negative for headaches.   Hematological: Does not bruise/bleed easily.   Psychiatric/Behavioral: Negative for confusion.     PAST MEDICAL HISTORY:  Past Medical History:   Diagnosis Date     Anxiety      Chronic abdominal  pain      Chronic pelvic pain in female      Depression      History of anesthesia complications     family issues slow to wake up after surgery     Migraine        PAST SURGICAL HISTORY:  Past Surgical History:   Procedure Laterality Date     ENDOSCOPIC RETROGRADE CHOLANGIOPANCREATOGRAM N/A 12/18/2019    Procedure: ENDOSCOPIC RETROGRADE CHOLANGIOPANCREATOGRAPHY with billary sphinctomy and stone extraction and  billary stent placement with epinephrine injection ;  Surgeon: Dustin Betancourt MD;  Location: [facility];  Service: Gastroenterology     ENDOSCOPIC RETROGRADE CHOLANGIOPANCREATOGRAM N/A 2/28/2020    Procedure: ENDOSCOPIC RETROGRADE CHOLANGIOPANCREATOGRAPHY, STENT REMOVAL, SLUDGE REMOVAL;  Surgeon: Dustin Betancourt MD;  Location: [facility];  Service: Gastroenterology     LAPAROSCOPIC CHOLECYSTECTOMY N/A 12/11/2019    Procedure: CHOLECYSTECTOMY, LAPAROSCOPIC;  Surgeon: Samia Portillo MD;  Location: [facility];  Service: General     DE LAP,DIAGNOSTIC ABDOMEN N/A 2/20/2018    Procedure: DIAGNOSTIC LAPAROSCOPY, LYSIS OF  ADHESIONS;  Surgeon: Manish Lyons MD;  Location: [facility];  Service: Gynecology     XR ERCP BILIARY ONLY  12/18/2019     XR ERCP BILIARY ONLY  2/28/2020           CURRENT MEDICATIONS:    doxycycline hyclate (VIBRAMYCIN) 100 MG capsule  metroNIDAZOLE (FLAGYL) 500 MG tablet  cholecalciferol 50 MCG (2000 UT) CAPS  cyanocobalamin (VITAMIN B-12) 1000 MCG tablet  dimenhyDRINATE (DRAMAMINE) 50 MG CHEW  ondansetron (ZOFRAN-ODT) 8 MG ODT tab        ALLERGIES:  Allergies   Allergen Reactions     Penicillin G Anaphylaxis     Sumatriptan Hives, Swelling and Anxiety     Went to ED after taking 25mg for first time, no objective findings, likely not true allergy     Augmentin Hives     Iohexol Hives and Itching     Pt had hives after premedication.  She had a prior reaction in 2020 at the ER given Iohexol and today was given Iohexol again with premedication of  "Prednistone 50 mg 13, 7, 1 hour prior.  Pt developed hives on head and abdomen.  Stable vitals HR 95, BP 90/57, O2 SAT 99%.  Pt given water and time and symptoms improved.  No breathing or respiratory issues.   Pt check by RN from primary care clinic.  Benedict     Peanut Oil Unknown     Peanut [Peanut Oil] Swelling     Throat swelling       FAMILY HISTORY:  Family History   Problem Relation Age of Onset     Cancer Mother      Asthma Mother      Asthma Sister      Mental Illness Sister      Heart Disease Sister        SOCIAL HISTORY:   Social History     Socioeconomic History     Marital status: Single   Tobacco Use     Smoking status: Current Every Day Smoker     Packs/day: 0.50     Smokeless tobacco: Never Used   Substance and Sexual Activity     Alcohol use: No     Comment: Alcoholic Drinks/day: rare     Drug use: Not Currently     Sexual activity: Yes     Partners: Male       VITALS:  /67   Pulse 78   Temp 99.3  F (37.4  C) (Tympanic)   Resp 16   Ht 1.651 m (5' 5\")   Wt 52.2 kg (115 lb)   LMP 06/16/2022   SpO2 98%   BMI 19.14 kg/m      PHYSICAL EXAM      Vitals: /67   Pulse 78   Temp 99.3  F (37.4  C) (Tympanic)   Resp 16   Ht 1.651 m (5' 5\")   Wt 52.2 kg (115 lb)   LMP 06/16/2022   SpO2 98%   BMI 19.14 kg/m    General: Appears in no acute distress, awake, alert, interactive.  Eyes: Conjunctivae non-injected. Sclera anicteric.  HENT: Atraumatic.  Neck: Supple.  Respiratory/Chest: Respiration unlabored.  Abdomen: Epigastric tenderness. LLQ tenderness. Normal bowel sounds.  Musculoskeletal: Normal extremities. No edema or erythema.  Skin: Normal color. No rash or diaphoresis.  Neurologic: Face symmetric, moves all extremities spontaneously. Speech clear.  Psychiatric: Oriented to person, place, and time. Affect appropriate.       LAB:  All pertinent labs reviewed and interpreted.  Results for orders placed or performed during the hospital encounter of 07/11/22   US Pelvic Complete with " Transvaginal    Impression    IMPRESSION:  1.  Negative pelvic ultrasound.         CT Abdomen Pelvis w/o Contrast    Impression    IMPRESSION:   1.  No acute findings or inflammatory changes in the abdomen or pelvis. No acute appendicitis.     UA with Microscopic reflex to Culture    Specimen: Urine, Clean Catch   Result Value Ref Range    Color Urine Light Yellow Colorless, Straw, Light Yellow, Yellow    Appearance Urine Cloudy (A) Clear    Glucose Urine Negative Negative mg/dL    Bilirubin Urine Negative Negative    Ketones Urine Negative Negative mg/dL    Specific Gravity Urine 1.016 1.001 - 1.030    Blood Urine 0.5 mg/dL (A) Negative    pH Urine 8.0 (H) 5.0 - 7.0    Protein Albumin Urine Negative Negative mg/dL    Urobilinogen Urine <2.0 <2.0 mg/dL    Nitrite Urine Negative Negative    Leukocyte Esterase Urine 25 Arnaud/uL (A) Negative    WBC Clumps Urine Present (A) None Seen /HPF    RBC Urine 5 (H) <=2 /HPF    WBC Urine 0 <=5 /HPF    Squamous Epithelials Urine 1 <=1 /HPF   Basic metabolic panel   Result Value Ref Range    Sodium 140 136 - 145 mmol/L    Potassium 3.7 3.5 - 5.0 mmol/L    Chloride 106 98 - 107 mmol/L    Carbon Dioxide (CO2) 26 22 - 31 mmol/L    Anion Gap 8 5 - 18 mmol/L    Urea Nitrogen 10 8 - 22 mg/dL    Creatinine 0.73 0.60 - 1.10 mg/dL    Calcium 10.1 8.5 - 10.5 mg/dL    Glucose 80 70 - 125 mg/dL    GFR Estimate >90 >60 mL/min/1.73m2   HCG quantitative pregnancy   Result Value Ref Range    hCG Quantitative <2 0 - 4 mlU/mL   CBC with platelets and differential   Result Value Ref Range    WBC Count 10.3 4.0 - 11.0 10e3/uL    RBC Count 4.70 3.80 - 5.20 10e6/uL    Hemoglobin 13.6 11.7 - 15.7 g/dL    Hematocrit 41.0 35.0 - 47.0 %    MCV 87 78 - 100 fL    MCH 28.9 26.5 - 33.0 pg    MCHC 33.2 31.5 - 36.5 g/dL    RDW 16.6 (H) 10.0 - 15.0 %    Platelet Count 297 150 - 450 10e3/uL    % Neutrophils 56 %    % Lymphocytes 35 %    % Monocytes 8 %    % Eosinophils 1 %    % Basophils 0 %    % Immature  Granulocytes 0 %    NRBCs per 100 WBC 0 <1 /100    Absolute Neutrophils 5.8 1.6 - 8.3 10e3/uL    Absolute Lymphocytes 3.6 0.8 - 5.3 10e3/uL    Absolute Monocytes 0.8 0.0 - 1.3 10e3/uL    Absolute Eosinophils 0.1 0.0 - 0.7 10e3/uL    Absolute Basophils 0.0 0.0 - 0.2 10e3/uL    Absolute Immature Granulocytes 0.0 <=0.4 10e3/uL    Absolute NRBCs 0.0 10e3/uL   Extra Red Top Tube   Result Value Ref Range    Hold Specimen [facility]    Hepatic function panel   Result Value Ref Range    Bilirubin Total 0.3 0.0 - 1.0 mg/dL    Bilirubin Direct <0.1 <=0.5 mg/dL    Protein Total 7.6 6.0 - 8.0 g/dL    Albumin 3.9 3.5 - 5.0 g/dL    Alkaline Phosphatase 84 45 - 120 U/L    AST 16 0 - 40 U/L    ALT 16 0 - 45 U/L   Result Value Ref Range    Lipase 33 0 - 52 U/L   Wet prep    Specimen: Vagina; Swab   Result Value Ref Range    Trichomonas Absent Absent    Yeast Absent Absent    Clue Cells Absent Absent    WBCs/high power field 4+ (A) None       RADIOLOGY:  Reviewed all pertinent imaging. Please see official radiology report.  US Pelvic Complete with Transvaginal   Final Result   IMPRESSION:   1.  Negative pelvic ultrasound.               CT Abdomen Pelvis w/o Contrast   Final Result   IMPRESSION:    1.  No acute findings or inflammatory changes in the abdomen or pelvis. No acute appendicitis.               I, Hugo Basurto, am serving as a scribe to document services personally performed by Karel Finch MD based on my observation and the provider's statements to me. I, Dr. Karel Finch, attest that Hugo Basurto is acting in a scribe capacity, has observed my performance of the services and has documented them in accordance with my direction.    Karel Finch MD  Emergency Medicine  [facility] EMERGENCY DEPARTMENT  [address] [place] [id number] [phone]       Karel Finch MD  07/11/22 0354

## 2022-07-11 NOTE — ED PROVIDER NOTES
I was asked to review the patient's wet prep test result from lab.  The informing that the gonorrhea chlamydia test was collected okay however trichomoniasis was not collected correctly.  However in the chart the trichomoniasis test has resulted as negative there were clue cells and she has been treated with metronidazole and doxycycline.  At this point time it does not appear that any additional testing is necessary.     Jaime Bell PA-C  07/11/22 2639

## 2022-07-12 LAB
C TRACH DNA SPEC QL PROBE+SIG AMP: NEGATIVE
N GONORRHOEA DNA SPEC QL NAA+PROBE: NEGATIVE

## 2022-07-17 ENCOUNTER — HEALTH MAINTENANCE LETTER (OUTPATIENT)
Age: 27
End: 2022-07-17

## 2022-07-30 PROCEDURE — C9803 HOPD COVID-19 SPEC COLLECT: HCPCS

## 2022-07-30 PROCEDURE — 999N000105 HC STATISTIC NO DOCUMENTATION TO SUPPORT CHARGE

## 2022-07-30 PROCEDURE — 99285 EMERGENCY DEPT VISIT HI MDM: CPT | Mod: CS,25

## 2022-07-31 ENCOUNTER — HOSPITAL ENCOUNTER (EMERGENCY)
Facility: HOSPITAL | Age: 27
Discharge: HOME | End: 2022-07-31
Attending: EMERGENCY MEDICINE | Admitting: EMERGENCY MEDICINE
Payer: COMMERCIAL

## 2022-07-31 ENCOUNTER — APPOINTMENT (OUTPATIENT)
Dept: RADIOLOGY | Facility: HOSPITAL | Age: 27
End: 2022-07-31
Attending: EMERGENCY MEDICINE
Payer: COMMERCIAL

## 2022-07-31 ENCOUNTER — APPOINTMENT (OUTPATIENT)
Dept: CT IMAGING | Facility: HOSPITAL | Age: 27
End: 2022-07-31
Attending: EMERGENCY MEDICINE
Payer: COMMERCIAL

## 2022-07-31 VITALS
HEIGHT: 65 IN | RESPIRATION RATE: 18 BRPM | HEART RATE: 73 BPM | OXYGEN SATURATION: 98 % | TEMPERATURE: 98.6 F | BODY MASS INDEX: 19.16 KG/M2 | DIASTOLIC BLOOD PRESSURE: 57 MMHG | WEIGHT: 115 LBS | SYSTOLIC BLOOD PRESSURE: 99 MMHG

## 2022-07-31 DIAGNOSIS — J01.00 ACUTE NON-RECURRENT MAXILLARY SINUSITIS: ICD-10-CM

## 2022-07-31 DIAGNOSIS — K04.7 DENTAL ABSCESS: ICD-10-CM

## 2022-07-31 PROCEDURE — 94640 AIRWAY INHALATION TREATMENT: CPT

## 2022-07-31 PROCEDURE — 999N000157 HC STATISTIC RCP TIME EA 10 MIN

## 2022-07-31 PROCEDURE — 70486 CT MAXILLOFACIAL W/O DYE: CPT

## 2022-07-31 PROCEDURE — 250N000013 HC RX MED GY IP 250 OP 250 PS 637: Performed by: EMERGENCY MEDICINE

## 2022-07-31 PROCEDURE — 71046 X-RAY EXAM CHEST 2 VIEWS: CPT

## 2022-07-31 PROCEDURE — 87636 SARSCOV2 & INF A&B AMP PRB: CPT | Performed by: EMERGENCY MEDICINE

## 2022-07-31 PROCEDURE — 250N000009 HC RX 250: Performed by: EMERGENCY MEDICINE

## 2022-07-31 RX ORDER — ECHINACEA PURPUREA EXTRACT 125 MG
TABLET ORAL
Qty: 30 ML | Refills: 0 | Status: SHIPPED | OUTPATIENT
Start: 2022-07-31

## 2022-07-31 RX ORDER — IBUPROFEN 600 MG/1
600 TABLET, FILM COATED ORAL ONCE
Status: DISCONTINUED | OUTPATIENT
Start: 2022-07-31 | End: 2022-07-31 | Stop reason: HOSPADM

## 2022-07-31 RX ORDER — IPRATROPIUM BROMIDE AND ALBUTEROL SULFATE 2.5; .5 MG/3ML; MG/3ML
3 SOLUTION RESPIRATORY (INHALATION) ONCE
Status: COMPLETED | OUTPATIENT
Start: 2022-07-31 | End: 2022-07-31

## 2022-07-31 RX ORDER — PSEUDOEPHEDRINE HCL 30 MG
30 TABLET ORAL EVERY 4 HOURS PRN
Qty: 20 TABLET | Refills: 0 | Status: SHIPPED | OUTPATIENT
Start: 2022-07-31 | End: 2023-10-19

## 2022-07-31 RX ORDER — CLINDAMYCIN HCL 150 MG
300 CAPSULE ORAL ONCE
Status: COMPLETED | OUTPATIENT
Start: 2022-07-31 | End: 2022-07-31

## 2022-07-31 RX ORDER — AZITHROMYCIN 250 MG/1
TABLET, FILM COATED ORAL
Qty: 6 TABLET | Refills: 0 | Status: SHIPPED | OUTPATIENT
Start: 2022-07-31 | End: 2022-08-05

## 2022-07-31 RX ADMIN — CLINDAMYCIN HYDROCHLORIDE 300 MG: 150 CAPSULE ORAL at 03:15

## 2022-07-31 RX ADMIN — IPRATROPIUM BROMIDE AND ALBUTEROL SULFATE 3 ML: 2.5; .5 SOLUTION RESPIRATORY (INHALATION) at 01:19

## 2022-07-31 ASSESSMENT — ENCOUNTER SYMPTOMS
FEVER: 0
STRIDOR: 0
SHORTNESS OF BREATH: 0
RHINORRHEA: 1
SINUS PRESSURE: 1
SINUS PAIN: 1
WHEEZING: 0
APNEA: 0
COUGH: 1

## 2022-07-31 NOTE — Clinical Note
Renata Staton was seen and treated in our emergency department on 7/30/2022.  She may return to work on 08/02/2022.       If you have any questions or concerns, please don't hesitate to call.      Jose Foote MD

## 2022-07-31 NOTE — ED NOTES
"Pt a/o x4. Denies pain - refused advil, but does endorses LUE \"pinched nerve\" feeling. VSS. Pt in no distress.   "

## 2022-07-31 NOTE — ED PROVIDER NOTES
NAME: Renata Staton  AGE: 26 year old female  YOB: 1995  MRN: [id number]  EVALUATION DATE & TIME: 2022 12:28 AM    PCP: Andria Hernández    ED PROVIDER: Jose Foote M.D.    Chief Complaint   Patient presents with     sinus pain, left arm pain     FINAL IMPRESSION:  1. Dental abscess    2. Acute non-recurrent maxillary sinusitis      MEDICAL DECISION MAKIN:02 AM I met with the patient, obtained history, performed an initial exam, and discussed options and plan for diagnostics and treatment here in the ED. PPE worn: n95 mask, eye protection and gloves.  2:24 AM Patient reassessed.  3:02 AM Results were communicated with the patient. Discussed discharge plans along with return precautions. Patient was agreeable with plan.   3:42 AM care discussed with Dr. Andrews, [facility] Oral surgery.   3:58 AM discussed recommendations from oral surgery and discharge with patient.  She is comfortable with this plan.     Patient was clinically assessed and consented to treatment. After assessment, medical decision making and workup were discussed with the patient. The patient was agreeable to plan for testing, workup, and treatment.  Pertinent Labs & Imaging studies reviewed. (See chart for details)     Renata Staton is a 26 year old female who presents with sinus pain and left arm pain.   Differential diagnosis includes but not limited to viral sinusitis, bacterial sinusitis, upper respiratory infection, pneumonia, COVID-19.  Patient with sinus pain and drainage especially when she puts her face forward or head down.  She also has intermittent slight cough and then noted left arm pain at the shoulder.  Patient appears otherwise comfortable and in no distress.  She is not coughing during exam.  Lung sounds are clear bilaterally however patient concerned about this foul drainage especially when she leans forward bending down comes out her nose or mouth and she coughs.  Chest x-ray obtained  which was negative for any pneumonia.  Patient was swabbed for COVID which was negative and CT scan of the face was performed.  CT scan did show extensive dental erosion on the left where patient had tooth extraction beginning of August.  There is no prominent large abscess or facial abscess, no facial cellulitis or swelling on exam.  The concerning possible apical abscess of the dentition possibly erodes into the sinus where there is purulent appearing material.  I discussed findings with oral surgery and recommendation was for outpatient follow-up with antibiotics.  Patient was comfortable with this plan and did not appear septic or have any red flags.  Recommendation for azithromycin along with decongestants and nasal spray.  Patient comfortable with plan and appointment was made for 9 AM on Monday which patient was told about and will plan for follow-up.    0 minutes of critical care time    MEDICATIONS GIVEN IN THE EMERGENCY:  Medications   ibuprofen (ADVIL/MOTRIN) tablet 600 mg (600 mg Oral Not Given 7/31/22 0134)   ipratropium - albuterol 0.5 mg/2.5 mg/3 mL (DUONEB) neb solution 3 mL (3 mLs Nebulization Given 7/31/22 0119)   clindamycin (CLEOCIN) capsule 300 mg (300 mg Oral Given 7/31/22 0315)       NEW PRESCRIPTIONS STARTED AT TODAY'S ER VISIT:  New Prescriptions    AZITHROMYCIN (ZITHROMAX Z-TERRIE) 250 MG TABLET    Two tablets on the first day, then one tablet daily for the next 4 days    PSEUDOEPHEDRINE (SUDAFED) 30 MG TABLET    Take 1 tablet (30 mg) by mouth every 4 hours as needed for congestion    SODIUM CHLORIDE (OCEAN) 0.65 % NASAL SPRAY    As needed for nasal congestion.          =================================================================    HPI    Patient information was obtained from: patient     Use of : N/A         Renata Staton is a 26 year old female with a past medical history of tobacco abuse, chronic daily headache, kidney disease, anxiety, who presents sinus pain and  "left arm pain.    Patient here with a day of sinus pain that radiates behind her eyes. She has been having intermittent rhinorrhea with clear drainage. She says that when she \"bends over I get an awful smell\". She did try using a mucus reliever with minimal relief but pain was still there so she took Tylenol. She did buy a generic sudafed medication but did not use it yet. She has had prior sinus infections but had never been this bad since she feels so \"congested\". She also endorses a mild cough and some discomfort in her chest. She did develop mild left arm pain a couple of hours ago which made her concerned for a heart attack. She also saw that her vein was bulging out but not currently. She was not laying on the arm in a weird position prior. No fever. No asthma. She is fully vaccinated against COVID.       REVIEW OF SYSTEMS   Review of Systems   Constitutional: Negative for fever.   HENT: Positive for rhinorrhea (intermittent (clear drainage)), sinus pressure (behind bilateral eyes) and sinus pain.    Respiratory: Positive for cough (mild). Negative for apnea, shortness of breath, wheezing and stridor.         Positive mild chest discomfort   Musculoskeletal:        Positive left arm pain   All other systems reviewed and are negative.       PAST MEDICAL HISTORY:  Past Medical History:   Diagnosis Date     Anxiety      Chronic abdominal pain      Chronic pelvic pain in female      Depression      History of anesthesia complications     family issues slow to wake up after surgery     Migraine        PAST SURGICAL HISTORY:  Past Surgical History:   Procedure Laterality Date     ENDOSCOPIC RETROGRADE CHOLANGIOPANCREATOGRAM N/A 12/18/2019    Procedure: ENDOSCOPIC RETROGRADE CHOLANGIOPANCREATOGRAPHY with billary sphinctomy and stone extraction and  billary stent placement with epinephrine injection ;  Surgeon: Dustin Betancourt MD;  Location: [facility] [place];  Service: Gastroenterology     ENDOSCOPIC RETROGRADE " CHOLANGIOPANCREATOGRAM N/A 2/28/2020    Procedure: ENDOSCOPIC RETROGRADE CHOLANGIOPANCREATOGRAPHY, STENT REMOVAL, SLUDGE REMOVAL;  Surgeon: Dustin Betancourt MD;  [place] [facility] [place];  Service: Gastroenterology     LAPAROSCOPIC CHOLECYSTECTOMY N/A 12/11/2019    Procedure: CHOLECYSTECTOMY, LAPAROSCOPIC;  Surgeon: Samia Portillo MD;  Location: [facility] [place];  Service: General     SD LAP,DIAGNOSTIC ABDOMEN N/A 2/20/2018    Procedure: DIAGNOSTIC LAPAROSCOPY, LYSIS OF  ADHESIONS;  Surgeon: Manish Lyons MD;  Location: [facility];  Service: Gynecology     XR ERCP BILIARY ONLY  12/18/2019     XR ERCP BILIARY ONLY  2/28/2020       CURRENT MEDICATIONS:      Current Facility-Administered Medications:      ibuprofen (ADVIL/MOTRIN) tablet 600 mg, 600 mg, Oral, Once, Jose Foote MD    Current Outpatient Medications:      azithromycin (ZITHROMAX Z-TERRIE) 250 MG tablet, Two tablets on the first day, then one tablet daily for the next 4 days, Disp: 6 tablet, Rfl: 0     pseudoePHEDrine (SUDAFED) 30 MG tablet, Take 1 tablet (30 mg) by mouth every 4 hours as needed for congestion, Disp: 20 tablet, Rfl: 0     sodium chloride (OCEAN) 0.65 % nasal spray, As needed for nasal congestion., Disp: 30 mL, Rfl: 0     cholecalciferol 50 MCG (2000 UT) CAPS, Take 2,000 Units by mouth daily, Disp: , Rfl:      cyanocobalamin (VITAMIN B-12) 1000 MCG tablet, Take 1,000 mcg by mouth daily, Disp: , Rfl:      dimenhyDRINATE (DRAMAMINE) 50 MG CHEW, Take 1 tablet (50 mg) by mouth every 6 hours as needed for other (nausea), Disp: 10 tablet, Rfl: 0     ondansetron (ZOFRAN-ODT) 8 MG ODT tab, Take 1 tablet (8 mg) by mouth every 8 hours as needed, Disp: 12 tablet, Rfl: 0    ALLERGIES:  Allergies   Allergen Reactions     Penicillin G Anaphylaxis     Sumatriptan Hives, Swelling and Anxiety     Went to ED after taking 25mg for first time, no objective findings, likely not true allergy     Augmentin Hives      "Iohexol Hives and Itching     Pt had hives after premedication.  She had a prior reaction in 2020 at the ER given Iohexol and today was given Iohexol again with premedication of Prednistone 50 mg 13, 7, 1 hour prior.  Pt developed hives on head and abdomen.  Stable vitals HR 95, BP 90/57, O2 SAT 99%.  Pt given water and time and symptoms improved.  No breathing or respiratory issues.   Pt check by RN from primary care clinic.  Benedict     Peanut Oil Unknown     Peanut [Peanut Oil] Swelling     Throat swelling       FAMILY HISTORY:  Family History   Problem Relation Age of Onset     Cancer Mother      Asthma Mother      Asthma Sister      Mental Illness Sister      Heart Disease Sister        SOCIAL HISTORY:   Social History     Socioeconomic History     Marital status: Single   Tobacco Use     Smoking status: Current Every Day Smoker     Packs/day: 0.50     Smokeless tobacco: Never Used   Substance and Sexual Activity     Alcohol use: No     Comment: Alcoholic Drinks/day: rare     Drug use: Not Currently     Sexual activity: Yes     Partners: Male       PHYSICAL EXAM:    Vitals: BP 99/57   Pulse 73   Temp 98.6  F (37  C) (Oral)   Resp 18   Ht 1.651 m (5' 5\")   Wt 52.2 kg (115 lb)   LMP 07/11/2022 (Approximate)   SpO2 98%   BMI 19.14 kg/m     Physical Exam  Vitals and nursing note reviewed.   Constitutional:       General: She is not in acute distress.     Appearance: Normal appearance. She is normal weight. She is not ill-appearing or toxic-appearing.   HENT:      Head: Normocephalic and atraumatic.      Nose: Mucosal edema, congestion and rhinorrhea present. No nasal deformity, signs of injury or nasal tenderness. Rhinorrhea is purulent.      Left Nostril: No epistaxis.      Right Turbinates: Swollen. Not pale.      Left Turbinates: Swollen. Not pale.      Right Sinus: No maxillary sinus tenderness or frontal sinus tenderness.      Left Sinus: Maxillary sinus tenderness present. No frontal sinus tenderness. "      Mouth/Throat:      Mouth: Mucous membranes are moist.      Pharynx: Oropharynx is clear. No oropharyngeal exudate or posterior oropharyngeal erythema.   Eyes:      General:         Right eye: No discharge.         Left eye: No discharge.      Extraocular Movements: Extraocular movements intact.      Pupils: Pupils are equal, round, and reactive to light.   Cardiovascular:      Rate and Rhythm: Normal rate and regular rhythm.      Heart sounds: Normal heart sounds.   Pulmonary:      Effort: Pulmonary effort is normal. No respiratory distress.      Breath sounds: No stridor. No wheezing, rhonchi or rales.      Comments: Course upper airway nasal sounds on auscultation.  Chest:      Chest wall: No tenderness.   Musculoskeletal:         General: No swelling, tenderness, deformity or signs of injury.      Cervical back: Normal range of motion and neck supple. No rigidity or tenderness.   Skin:     General: Skin is warm and dry.      Findings: No erythema.   Neurological:      General: No focal deficit present.      Mental Status: She is alert.   Psychiatric:         Behavior: Behavior normal.           LAB:  All pertinent labs reviewed and interpreted.  Labs Ordered and Resulted from Time of ED Arrival to Time of ED Departure   INFLUENZA A/B & SARS-COV2 PCR MULTIPLEX - Normal       Result Value    Influenza A PCR Negative      Influenza B PCR Negative      SARS CoV2 PCR Negative         RADIOLOGY:  CT Facial Bones without Contrast   Final Result   IMPRESSION:    1.  Findings concerning for acute left maxillary and left frontal sinusitis.   2.  Extensive carious erosion involving the dentition of the maxilla and mandible. There is marked carious erosion of the left second maxillary molar with a prominent periapical lucency involving its root which thins the cortex of the floor of the left    maxillary sinus. The patient's left maxillary sinus disease may be odontogenic in origin. Follow-up dental consultation is  recommended.         XR Chest 2 Views   Final Result   IMPRESSION: Lungs are clear. No focal airspace consolidation. No pleural effusion or pneumothorax.      Cardiomediastinal silhouette is normal.      Postsurgical changes of cholecystectomy.        PROCEDURES:   None      I, Isabel Stern, am serving as a scribe to document services personally performed by Dr. Jose Foote  based on my observation and the provider's statements to me. I, Jose Foote MD attest that Isabel Stern is acting in a scribe capacity, has observed my performance of the services and has documented them in accordance with my direction.      Jose Foote M.D.  Emergency Medicine  [facility] Emergency Department     Jose Foote MD  07/31/22 0284

## 2022-07-31 NOTE — ED TRIAGE NOTES
Left sinus pain for 1 day, and left arm pain for 4 hours. Pain an 8. She is a smoker. Also complains of smelly discharge from nose. She took a mucus relief med with little relief.     Triage Assessment     Row Name 07/30/22 8012       Triage Assessment (Adult)    Airway WDL WDL       Respiratory WDL    Respiratory WDL WDL       Skin Circulation/Temperature WDL    Skin Circulation/Temperature WDL WDL       Cardiac WDL    Cardiac WDL WDL       Peripheral/Neurovascular WDL    Peripheral Neurovascular WDL X   left arm pain       Cognitive/Neuro/Behavioral WDL    Cognitive/Neuro/Behavioral WDL WDL

## 2022-08-15 ENCOUNTER — HOSPITAL ENCOUNTER (EMERGENCY)
Facility: HOSPITAL | Age: 27
Discharge: HOME | End: 2022-08-15
Attending: EMERGENCY MEDICINE | Admitting: EMERGENCY MEDICINE
Payer: COMMERCIAL

## 2022-08-15 ENCOUNTER — APPOINTMENT (OUTPATIENT)
Dept: RADIOLOGY | Facility: HOSPITAL | Age: 27
End: 2022-08-15
Attending: STUDENT IN AN ORGANIZED HEALTH CARE EDUCATION/TRAINING PROGRAM
Payer: COMMERCIAL

## 2022-08-15 VITALS
HEIGHT: 65 IN | WEIGHT: 117 LBS | DIASTOLIC BLOOD PRESSURE: 74 MMHG | SYSTOLIC BLOOD PRESSURE: 138 MMHG | RESPIRATION RATE: 16 BRPM | OXYGEN SATURATION: 96 % | HEART RATE: 75 BPM | BODY MASS INDEX: 19.49 KG/M2 | TEMPERATURE: 98.3 F

## 2022-08-15 DIAGNOSIS — R07.9 CHEST PAIN, UNSPECIFIED TYPE: ICD-10-CM

## 2022-08-15 DIAGNOSIS — R20.2 TINGLING: ICD-10-CM

## 2022-08-15 DIAGNOSIS — F41.9 ANXIETY: ICD-10-CM

## 2022-08-15 LAB
ALBUMIN SERPL-MCNC: 4.5 G/DL (ref 3.5–5)
ALP SERPL-CCNC: 92 U/L (ref 45–120)
ALT SERPL W P-5'-P-CCNC: 29 U/L (ref 0–45)
ANION GAP SERPL CALCULATED.3IONS-SCNC: 10 MMOL/L (ref 5–18)
AST SERPL W P-5'-P-CCNC: 15 U/L (ref 0–40)
BASOPHILS # BLD AUTO: 0 10E3/UL (ref 0–0.2)
BASOPHILS NFR BLD AUTO: 0 %
BILIRUB SERPL-MCNC: 0.4 MG/DL (ref 0–1)
BUN SERPL-MCNC: 9 MG/DL (ref 8–22)
CALCIUM SERPL-MCNC: 9.4 MG/DL (ref 8.5–10.5)
CHLORIDE BLD-SCNC: 104 MMOL/L (ref 98–107)
CO2 SERPL-SCNC: 25 MMOL/L (ref 22–31)
CREAT SERPL-MCNC: 0.74 MG/DL (ref 0.6–1.1)
D DIMER PPP FEU-MCNC: 0.29 UG/ML FEU (ref 0–0.5)
EOSINOPHIL # BLD AUTO: 0.1 10E3/UL (ref 0–0.7)
EOSINOPHIL NFR BLD AUTO: 1 %
ERYTHROCYTE [DISTWIDTH] IN BLOOD BY AUTOMATED COUNT: 15 % (ref 10–15)
GFR SERPL CREATININE-BSD FRML MDRD: >90 ML/MIN/1.73M2
GLUCOSE BLD-MCNC: 119 MG/DL (ref 70–125)
HCT VFR BLD AUTO: 42.6 % (ref 35–47)
HGB BLD-MCNC: 14.7 G/DL (ref 11.7–15.7)
IMM GRANULOCYTES # BLD: 0 10E3/UL
IMM GRANULOCYTES NFR BLD: 0 %
LIPASE SERPL-CCNC: 16 U/L (ref 0–52)
LYMPHOCYTES # BLD AUTO: 2.8 10E3/UL (ref 0.8–5.3)
LYMPHOCYTES NFR BLD AUTO: 29 %
MAGNESIUM SERPL-MCNC: 1.8 MG/DL (ref 1.8–2.6)
MCH RBC QN AUTO: 30.3 PG (ref 26.5–33)
MCHC RBC AUTO-ENTMCNC: 34.5 G/DL (ref 31.5–36.5)
MCV RBC AUTO: 88 FL (ref 78–100)
MONOCYTES # BLD AUTO: 0.5 10E3/UL (ref 0–1.3)
MONOCYTES NFR BLD AUTO: 5 %
NEUTROPHILS # BLD AUTO: 6.3 10E3/UL (ref 1.6–8.3)
NEUTROPHILS NFR BLD AUTO: 65 %
NRBC # BLD AUTO: 0 10E3/UL
NRBC BLD AUTO-RTO: 0 /100
PLATELET # BLD AUTO: 317 10E3/UL (ref 150–450)
POTASSIUM BLD-SCNC: 3.5 MMOL/L (ref 3.5–5)
PROT SERPL-MCNC: 7.9 G/DL (ref 6–8)
RBC # BLD AUTO: 4.85 10E6/UL (ref 3.8–5.2)
SODIUM SERPL-SCNC: 139 MMOL/L (ref 136–145)
TROPONIN I SERPL-MCNC: 0.01 NG/ML (ref 0–0.29)
WBC # BLD AUTO: 9.8 10E3/UL (ref 4–11)

## 2022-08-15 PROCEDURE — 258N000003 HC RX IP 258 OP 636: Performed by: EMERGENCY MEDICINE

## 2022-08-15 PROCEDURE — 96360 HYDRATION IV INFUSION INIT: CPT

## 2022-08-15 PROCEDURE — 85379 FIBRIN DEGRADATION QUANT: CPT | Performed by: STUDENT IN AN ORGANIZED HEALTH CARE EDUCATION/TRAINING PROGRAM

## 2022-08-15 PROCEDURE — 250N000013 HC RX MED GY IP 250 OP 250 PS 637: Performed by: EMERGENCY MEDICINE

## 2022-08-15 PROCEDURE — 99284 EMERGENCY DEPT VISIT MOD MDM: CPT | Mod: 25

## 2022-08-15 PROCEDURE — 84484 ASSAY OF TROPONIN QUANT: CPT | Performed by: STUDENT IN AN ORGANIZED HEALTH CARE EDUCATION/TRAINING PROGRAM

## 2022-08-15 PROCEDURE — 96361 HYDRATE IV INFUSION ADD-ON: CPT

## 2022-08-15 PROCEDURE — 83690 ASSAY OF LIPASE: CPT | Performed by: STUDENT IN AN ORGANIZED HEALTH CARE EDUCATION/TRAINING PROGRAM

## 2022-08-15 PROCEDURE — 85004 AUTOMATED DIFF WBC COUNT: CPT | Performed by: STUDENT IN AN ORGANIZED HEALTH CARE EDUCATION/TRAINING PROGRAM

## 2022-08-15 PROCEDURE — 83735 ASSAY OF MAGNESIUM: CPT | Performed by: STUDENT IN AN ORGANIZED HEALTH CARE EDUCATION/TRAINING PROGRAM

## 2022-08-15 PROCEDURE — 36415 COLL VENOUS BLD VENIPUNCTURE: CPT | Performed by: STUDENT IN AN ORGANIZED HEALTH CARE EDUCATION/TRAINING PROGRAM

## 2022-08-15 PROCEDURE — 71046 X-RAY EXAM CHEST 2 VIEWS: CPT

## 2022-08-15 PROCEDURE — 80053 COMPREHEN METABOLIC PANEL: CPT | Performed by: STUDENT IN AN ORGANIZED HEALTH CARE EDUCATION/TRAINING PROGRAM

## 2022-08-15 RX ORDER — HYDROXYZINE PAMOATE 25 MG/1
25 CAPSULE ORAL 3 TIMES DAILY PRN
Qty: 20 CAPSULE | Refills: 0 | Status: SHIPPED | OUTPATIENT
Start: 2022-08-15 | End: 2024-07-03

## 2022-08-15 RX ORDER — HYDROXYZINE HYDROCHLORIDE 25 MG/1
25 TABLET, FILM COATED ORAL ONCE
Status: COMPLETED | OUTPATIENT
Start: 2022-08-15 | End: 2022-08-15

## 2022-08-15 RX ADMIN — SODIUM CHLORIDE 1000 ML: 9 INJECTION, SOLUTION INTRAVENOUS at 20:21

## 2022-08-15 RX ADMIN — HYDROXYZINE HYDROCHLORIDE 25 MG: 25 TABLET, FILM COATED ORAL at 21:08

## 2022-08-15 ASSESSMENT — ACTIVITIES OF DAILY LIVING (ADL): ADLS_ACUITY_SCORE: 35

## 2022-08-15 NOTE — ED TRIAGE NOTES
Patient had an iron infusion, where she had a allergic reaction. Since then she has been having increasing chest pain, with shortness of breath along with numbness in her arms. Patient report that she has a fair amt of anxiety in the present moment.      Triage Assessment     Row Name 08/15/22 1825       Triage Assessment (Adult)    Airway WDL WDL

## 2022-08-15 NOTE — ED NOTES
ED Provider In Triage Note  [facility]  Encounter Date: Aug 15, 2022    No chief complaint on file.      Brief HPI:   Renata Staton is a 26 year old female presenting to the Emergency Department with a chief complaint of chest pain.  Had iron infusion a few days ago had allergic reaction.  Has iron deficiency anemia.  Feels cp and sob on occasion.  No has worsening CP and sob and feels both hands numb and like she is going to pass out.    Brief Physical Exam:  LMP 07/11/2022 (Approximate)   General: Non-toxic appearing  HEENT: Atraumatic  Resp: No respiratory distress  Abdomen: Non-peritoneal  Neuro: Alert, oriented, answers questions appropriately  Psych: Behavior appropriate      Plan Initiated in Triage:  Will order blood work and ekg. Suspect anxiety but will want to evaluate electrolytes and anemia.  No orders of the defined types were placed in this encounter.      PIT Dispo:   Return to lobby while awaiting workup and ED bed availability    Laci Glover MD on 8/15/2022 at 6:22 PM    Patient was evaluated by the Physician in Triage due to a limitation of available rooms in the Emergency Department. A plan of care was discussed based on the information obtained on the initial evaluation and patient was consuled to return back to the Emergency Department lobby after this initial evalutaiton until results were obtained or a room became available in the Emergency Department. Patient was counseled not to leave prior to receiving the results of their workup.     Laci Glover MD  [facility] EMERGENCY DEPARTMENT  [address] [place] [id number]  [phone]       Laci Glover MD  08/15/22 8132

## 2022-08-16 NOTE — ED NOTES
Patient states anxiety is very high, crying and visibly upset. Gave reassurance and spoke with provider.

## 2022-08-16 NOTE — DISCHARGE INSTRUCTIONS
You were seen in the Emergency Department today for evaluation of some chest pain.  Your lab work showed no cause of your symptoms. Your imaging studies showed no significant cause of your symptoms. You were prescribed hydroxyzine as needed for anxiety. You should take all medications as prescribed.  Follow up with your primary care physician to ensure resolution of symptoms. Return if you have new or worsening symptoms.

## 2022-08-16 NOTE — ED NOTES
Pt c/o increasing anxiety, crying in room, worried about health, when asked pt stated that zoloft was a past way to manage anxiety but has been forgetting to take it recently. Also stated that they used smoking to cope regularly. RN notified about increased anxiety.

## 2022-08-16 NOTE — ED NOTES
"Pt reports feeling fatigued  With \"numbness\" both arms and legs while receiving an iron infusion last Friday. Infusion was stopped . She called her hematologist today and she was supposed to call in an rx for a medrol dose pack. The rx wasn't there before she went into work today and she notes sx felt worse so she came to ED. She reports used to take zoloft for her anxiety.   "

## 2022-08-16 NOTE — ED PROVIDER NOTES
EMERGENCY DEPARTMENT ENCOUNTER      NAME: Renata Staton  AGE: 26 year old female  YOB: 1995  MRN: [id number]  EVALUATION DATE & TIME: 8/15/2022  7:33 PM    PCP: Andria Hernández    ED PROVIDER: Sherron Galdamez M.D.      Chief Complaint   Patient presents with     Chest Pain     Shortness of Breath     FINAL IMPRESSION:  1. Chest pain, unspecified type    2. Anxiety    3. Tingling      ED COURSE & MEDICAL DECISION MAKING:    Pertinent Labs & Imaging studies reviewed. (See chart for details)  ED Course as of 08/15/22 2234   Mon Aug 15, 2022   2012 Patient is a pleasant 26-year-old female who comes in today with some chest pain and shortness of breath and then she had tingling to her hands and feet.  She had had some recent diarrhea and was very concerned about her symptoms.  She thinks she may have had some anxiety contributing to this as well.  She had an iron infusion on Monday and thinks that she may have had a little bit of a reaction to that.  Her hemoglobin here is 14.7 which is great.  Her lab work so far is unremarkable.  D-dimer is negative.  Troponin is negative.  Chest x-ray is negative.  I think her tingling in the hands and feet was most likely related to hyperventilation.  That has mostly resolved.  We will get a give her some IV fluids because she does feel like her mucous membranes are dry and she was little tachycardic on arrival.  I think once that is done she can likely discharge home and she is in agreement with that plan.   2051 Patient would like some hydroxyzine to go home with and I think that is reasonable.  I will send her with a prescription for as needed for anxiety.   2138 Blood work all came back okay.  I will get the patient discharged home.   2146 Patient is feeling quite a bit better.  Discussed results and plan for discharge and she is in agreement.  I will send her with a prescription for Vistaril.        8:09 PM Met with and introduced myself to the patient.  "Discussed history and plan of care.    At the conclusion of the encounter I discussed  the results of all of the tests and the disposition with patient.   All questions were answered.  The patient acknowledged understanding and was involved in the decision making regarding the overall care plan.      I discussed with patient the utility, limitations and findings of the exam/interventions/studies done during this visit as well as the list of differential diagnosis and symptoms to monitor/return to ER for.  Additional verbal discharge instructions were provided.       MEDICATIONS GIVEN IN THE EMERGENCY:  Medications   0.9% sodium chloride BOLUS (0 mLs Intravenous Stopped 8/15/22 2157)   hydrOXYzine (ATARAX) tablet 25 mg (25 mg Oral Given 8/15/22 2108)       NEW PRESCRIPTIONS STARTED AT TODAY'S ER VISIT  Discharge Medication List as of 8/15/2022  9:58 PM      START taking these medications    Details   hydrOXYzine (VISTARIL) 25 MG capsule Take 1 capsule (25 mg) by mouth 3 times daily as needed for anxiety, Disp-20 capsule, R-0, E-Prescribe                =================================================================    HPI    Triage Note:   Patient had an iron infusion, where she had a allergic reaction. Since then she has been having increasing chest pain, with shortness of breath along with numbness in her arms. Patient report that she has a fair amt of anxiety in the present moment.      Triage Assessment     Row Name 08/15/22 1345       Triage Assessment (Adult)    Airway WDL WDL                  Patient information was obtained from: Marie    Use of : N/A       Renata Staton is a 26 year old female who presents to the ED for evaluation of chest pain and shortness of breath.    The patient reports that her arms and legs became numb and \"crampy\" today while she was at work. This then resulted in chest pain and shortness of breath. States that she has had similar symptoms in the past due to her " iron deficiency anemia. The patient reports that she recently had an iron infusion just prior to onset of symptoms which she believed caused an allergic reaction. States that this reaction made her nervous and caused an anxiety attack. No other concerns at this time.       REVIEW OF SYSTEMS   Except as stated in the HPI all other systems reviewed and are negative.    PAST MEDICAL HISTORY:  Past Medical History:   Diagnosis Date     Anxiety      Chronic abdominal pain      Chronic pelvic pain in female      Depression      History of anesthesia complications     family issues slow to wake up after surgery     Migraine        PAST SURGICAL HISTORY:  Past Surgical History:   Procedure Laterality Date     ENDOSCOPIC RETROGRADE CHOLANGIOPANCREATOGRAM N/A 12/18/2019    Procedure: ENDOSCOPIC RETROGRADE CHOLANGIOPANCREATOGRAPHY with billary sphinctomy and stone extraction and  billary stent placement with epinephrine injection ;  Surgeon: Dustin Betancourt MD;  Location: [facility];  Service: Gastroenterology     ENDOSCOPIC RETROGRADE CHOLANGIOPANCREATOGRAM N/A 2/28/2020    Procedure: ENDOSCOPIC RETROGRADE CHOLANGIOPANCREATOGRAPHY, STENT REMOVAL, SLUDGE REMOVAL;  Surgeon: Dustin Betancourt MD;  Location: [facility] [place];  Service: Gastroenterology     LAPAROSCOPIC CHOLECYSTECTOMY N/A 12/11/2019    Procedure: CHOLECYSTECTOMY, LAPAROSCOPIC;  Surgeon: Samia Portillo MD;  Location: [facility] [place];  Service: General     AK LAP,DIAGNOSTIC ABDOMEN N/A 2/20/2018    Procedure: DIAGNOSTIC LAPAROSCOPY, LYSIS OF  ADHESIONS;  Surgeon: Manish Lyons MD;  Location: [facility];  Service: Gynecology     XR ERCP BILIARY ONLY  12/18/2019     XR ERCP BILIARY ONLY  2/28/2020       CURRENT MEDICATIONS:    No current facility-administered medications for this encounter.    Current Outpatient Medications:      hydrOXYzine (VISTARIL) 25 MG capsule, Take 1 capsule (25 mg) by mouth 3 times daily as needed for  anxiety, Disp: 20 capsule, Rfl: 0     cholecalciferol 50 MCG (2000 UT) CAPS, Take 2,000 Units by mouth daily, Disp: , Rfl:      cyanocobalamin (VITAMIN B-12) 1000 MCG tablet, Take 1,000 mcg by mouth daily, Disp: , Rfl:      dimenhyDRINATE (DRAMAMINE) 50 MG CHEW, Take 1 tablet (50 mg) by mouth every 6 hours as needed for other (nausea), Disp: 10 tablet, Rfl: 0     ondansetron (ZOFRAN-ODT) 8 MG ODT tab, Take 1 tablet (8 mg) by mouth every 8 hours as needed, Disp: 12 tablet, Rfl: 0     pseudoePHEDrine (SUDAFED) 30 MG tablet, Take 1 tablet (30 mg) by mouth every 4 hours as needed for congestion, Disp: 20 tablet, Rfl: 0     sodium chloride (OCEAN) 0.65 % nasal spray, As needed for nasal congestion., Disp: 30 mL, Rfl: 0    ALLERGIES:  Allergies   Allergen Reactions     Penicillin G Anaphylaxis     Sumatriptan Hives, Swelling and Anxiety     Went to ED after taking 25mg for first time, no objective findings, likely not true allergy     Augmentin Hives     Iohexol Hives and Itching     Pt had hives after premedication.  She had a prior reaction in 2020 at the ER given Iohexol and today was given Iohexol again with premedication of Prednistone 50 mg 13, 7, 1 hour prior.  Pt developed hives on head and abdomen.  Stable vitals HR 95, BP 90/57, O2 SAT 99%.  Pt given water and time and symptoms improved.  No breathing or respiratory issues.   Pt check by RN from primary care clinic.  Benedict     Peanut Oil Unknown     Peanut [Peanut Oil] Swelling     Throat swelling       FAMILY HISTORY:  Family History   Problem Relation Age of Onset     Cancer Mother      Asthma Mother      Asthma Sister      Mental Illness Sister      Heart Disease Sister        SOCIAL HISTORY:   Social History     Socioeconomic History     Marital status: Single     Spouse name: None     Number of children: None     Years of education: None     Highest education level: None   Tobacco Use     Smoking status: Current Every Day Smoker     Packs/day: 0.50      "Smokeless tobacco: Never Used   Substance and Sexual Activity     Alcohol use: No     Comment: Alcoholic Drinks/day: rare     Drug use: Not Currently     Sexual activity: Yes     Partners: Male       PHYSICAL EXAM    VITAL SIGNS: /74   Pulse 75   Temp 98.3  F (36.8  C) (Oral)   Resp 16   Ht 1.651 m (5' 5\")   Wt 53.1 kg (117 lb)   LMP 07/11/2022 (Approximate)   SpO2 96%   BMI 19.47 kg/m     GENERAL: Awake, Alert, answering questions, No acute distress, Well nourished  HEENT: Normal cephalic, Atraumatic, bilateral external ears normal, No scleral icterus, mask in place  NECK: No obvious swelling or abnormality, No stridor  PULMONARY:Normal and symmetric breath sounds, No respiratory distress, Lungs clear to auscultation bilaterally. No wheezing  CARDIOVASCULAR: Regular rate and rhythm, Distal pulses present and normal.  ABDOMINAL: Soft, Nondistended, Nontender, No flank tenderness, No palpable masses  BACK: No tenderness.  EXTREMITIES: Moves all extremities spontaneously, warm, no edema, No major deformities  NEURO: No facial droop, normal motor function, Normal speech   PSYCH: Normal mood and affect  SKIN: No rashes on visualized skin, dry, warm     LAB:  All pertinent labs reviewed and interpreted.  Results for orders placed or performed during the hospital encounter of 08/15/22   Chest XR,  PA & LAT    Impression    IMPRESSION: Negative chest.   Comprehensive metabolic panel   Result Value Ref Range    Sodium 139 136 - 145 mmol/L    Potassium 3.5 3.5 - 5.0 mmol/L    Chloride 104 98 - 107 mmol/L    Carbon Dioxide (CO2) 25 22 - 31 mmol/L    Anion Gap 10 5 - 18 mmol/L    Urea Nitrogen 9 8 - 22 mg/dL    Creatinine 0.74 0.60 - 1.10 mg/dL    Calcium 9.4 8.5 - 10.5 mg/dL    Glucose 119 70 - 125 mg/dL    Alkaline Phosphatase 92 45 - 120 U/L    AST 15 0 - 40 U/L    ALT 29 0 - 45 U/L    Protein Total 7.9 6.0 - 8.0 g/dL    Albumin 4.5 3.5 - 5.0 g/dL    Bilirubin Total 0.4 0.0 - 1.0 mg/dL    GFR Estimate >90 >60 " mL/min/1.73m2   Result Value Ref Range    Magnesium 1.8 1.8 - 2.6 mg/dL   Troponin I (now)   Result Value Ref Range    Troponin I 0.01 0.00 - 0.29 ng/mL   D dimer quantitative   Result Value Ref Range    D-Dimer Quantitative 0.29 0.00 - 0.50 ug/mL FEU   Result Value Ref Range    Lipase 16 0 - 52 U/L   CBC with platelets and differential   Result Value Ref Range    WBC Count 9.8 4.0 - 11.0 10e3/uL    RBC Count 4.85 3.80 - 5.20 10e6/uL    Hemoglobin 14.7 11.7 - 15.7 g/dL    Hematocrit 42.6 35.0 - 47.0 %    MCV 88 78 - 100 fL    MCH 30.3 26.5 - 33.0 pg    MCHC 34.5 31.5 - 36.5 g/dL    RDW 15.0 10.0 - 15.0 %    Platelet Count 317 150 - 450 10e3/uL    % Neutrophils 65 %    % Lymphocytes 29 %    % Monocytes 5 %    % Eosinophils 1 %    % Basophils 0 %    % Immature Granulocytes 0 %    NRBCs per 100 WBC 0 <1 /100    Absolute Neutrophils 6.3 1.6 - 8.3 10e3/uL    Absolute Lymphocytes 2.8 0.8 - 5.3 10e3/uL    Absolute Monocytes 0.5 0.0 - 1.3 10e3/uL    Absolute Eosinophils 0.1 0.0 - 0.7 10e3/uL    Absolute Basophils 0.0 0.0 - 0.2 10e3/uL    Absolute Immature Granulocytes 0.0 <=0.4 10e3/uL    Absolute NRBCs 0.0 10e3/uL       RADIOLOGY:  Chest XR,  PA & LAT   Final Result   IMPRESSION: Negative chest.            I, Andria Uriarte, am serving as a scribe to document services personally performed by Dr. Galdamez based on my observation and the provider's statements to me. I, Sherron Galdamez MD attest that Andria Uriarte is acting in a scribe capacity, has observed my performance of the services and has documented them in accordance with my direction.    Sherron Galdamez M.D.  Emergency Medicine  [facility] EMERGENCY DEPARTMENT  [address] [place] [id number] [phone]  Dept: [phone]     Sherron Galdamez MD  08/15/22 8824

## 2022-09-09 ENCOUNTER — HOSPITAL ENCOUNTER (EMERGENCY)
Facility: HOSPITAL | Age: 27
Discharge: HOME | End: 2022-09-09
Admitting: STUDENT IN AN ORGANIZED HEALTH CARE EDUCATION/TRAINING PROGRAM
Payer: COMMERCIAL

## 2022-09-09 VITALS
BODY MASS INDEX: 18.99 KG/M2 | RESPIRATION RATE: 16 BRPM | SYSTOLIC BLOOD PRESSURE: 101 MMHG | HEART RATE: 78 BPM | OXYGEN SATURATION: 99 % | WEIGHT: 114 LBS | HEIGHT: 65 IN | TEMPERATURE: 98.4 F | DIASTOLIC BLOOD PRESSURE: 75 MMHG

## 2022-09-09 DIAGNOSIS — M79.604 PAIN OF RIGHT LOWER EXTREMITY: ICD-10-CM

## 2022-09-09 PROCEDURE — 99283 EMERGENCY DEPT VISIT LOW MDM: CPT

## 2022-09-09 ASSESSMENT — ENCOUNTER SYMPTOMS
WEAKNESS: 0
NUMBNESS: 0
FEVER: 0
CHILLS: 0
SHORTNESS OF BREATH: 0
MYALGIAS: 1
WOUND: 0

## 2022-09-10 ENCOUNTER — APPOINTMENT (OUTPATIENT)
Dept: ULTRASOUND IMAGING | Facility: HOSPITAL | Age: 27
End: 2022-09-10
Attending: EMERGENCY MEDICINE
Payer: COMMERCIAL

## 2022-09-10 ENCOUNTER — HOSPITAL ENCOUNTER (EMERGENCY)
Facility: HOSPITAL | Age: 27
Discharge: HOME | End: 2022-09-10
Attending: EMERGENCY MEDICINE | Admitting: EMERGENCY MEDICINE
Payer: COMMERCIAL

## 2022-09-10 ENCOUNTER — APPOINTMENT (OUTPATIENT)
Dept: RADIOLOGY | Facility: HOSPITAL | Age: 27
End: 2022-09-10
Attending: EMERGENCY MEDICINE
Payer: COMMERCIAL

## 2022-09-10 VITALS
OXYGEN SATURATION: 96 % | HEART RATE: 71 BPM | SYSTOLIC BLOOD PRESSURE: 94 MMHG | TEMPERATURE: 98.6 F | HEIGHT: 65 IN | DIASTOLIC BLOOD PRESSURE: 60 MMHG | RESPIRATION RATE: 20 BRPM | BODY MASS INDEX: 18.99 KG/M2 | WEIGHT: 114 LBS

## 2022-09-10 DIAGNOSIS — R06.02 SHORTNESS OF BREATH: ICD-10-CM

## 2022-09-10 DIAGNOSIS — M79.661 PAIN OF RIGHT LOWER LEG: ICD-10-CM

## 2022-09-10 LAB
ANION GAP SERPL CALCULATED.3IONS-SCNC: 10 MMOL/L (ref 5–18)
BASOPHILS # BLD AUTO: 0 10E3/UL (ref 0–0.2)
BASOPHILS NFR BLD AUTO: 0 %
BUN SERPL-MCNC: 11 MG/DL (ref 8–22)
CALCIUM SERPL-MCNC: 9.6 MG/DL (ref 8.5–10.5)
CHLORIDE BLD-SCNC: 108 MMOL/L (ref 98–107)
CO2 SERPL-SCNC: 21 MMOL/L (ref 22–31)
CREAT SERPL-MCNC: 0.9 MG/DL (ref 0.6–1.1)
D DIMER PPP FEU-MCNC: 0.28 UG/ML FEU (ref 0–0.5)
EOSINOPHIL # BLD AUTO: 0 10E3/UL (ref 0–0.7)
EOSINOPHIL NFR BLD AUTO: 0 %
ERYTHROCYTE [DISTWIDTH] IN BLOOD BY AUTOMATED COUNT: 14 % (ref 10–15)
GFR SERPL CREATININE-BSD FRML MDRD: >90 ML/MIN/1.73M2
GLUCOSE BLD-MCNC: 84 MG/DL (ref 70–125)
HCT VFR BLD AUTO: 36.3 % (ref 35–47)
HGB BLD-MCNC: 13 G/DL (ref 11.7–15.7)
IMM GRANULOCYTES # BLD: 0 10E3/UL
IMM GRANULOCYTES NFR BLD: 0 %
LYMPHOCYTES # BLD AUTO: 2.1 10E3/UL (ref 0.8–5.3)
LYMPHOCYTES NFR BLD AUTO: 37 %
MCH RBC QN AUTO: 32 PG (ref 26.5–33)
MCHC RBC AUTO-ENTMCNC: 35.8 G/DL (ref 31.5–36.5)
MCV RBC AUTO: 89 FL (ref 78–100)
MONOCYTES # BLD AUTO: 0.5 10E3/UL (ref 0–1.3)
MONOCYTES NFR BLD AUTO: 8 %
NEUTROPHILS # BLD AUTO: 3.1 10E3/UL (ref 1.6–8.3)
NEUTROPHILS NFR BLD AUTO: 55 %
NRBC # BLD AUTO: 0 10E3/UL
NRBC BLD AUTO-RTO: 0 /100
PLATELET # BLD AUTO: 253 10E3/UL (ref 150–450)
POTASSIUM BLD-SCNC: 3.5 MMOL/L (ref 3.5–5)
RBC # BLD AUTO: 4.06 10E6/UL (ref 3.8–5.2)
SODIUM SERPL-SCNC: 139 MMOL/L (ref 136–145)
TROPONIN I SERPL-MCNC: <0.01 NG/ML (ref 0–0.29)
WBC # BLD AUTO: 5.8 10E3/UL (ref 4–11)

## 2022-09-10 PROCEDURE — 71045 X-RAY EXAM CHEST 1 VIEW: CPT

## 2022-09-10 PROCEDURE — 250N000013 HC RX MED GY IP 250 OP 250 PS 637: Performed by: EMERGENCY MEDICINE

## 2022-09-10 PROCEDURE — 99285 EMERGENCY DEPT VISIT HI MDM: CPT | Mod: 25

## 2022-09-10 PROCEDURE — 84484 ASSAY OF TROPONIN QUANT: CPT | Performed by: EMERGENCY MEDICINE

## 2022-09-10 PROCEDURE — 80048 BASIC METABOLIC PNL TOTAL CA: CPT | Performed by: EMERGENCY MEDICINE

## 2022-09-10 PROCEDURE — 93005 ELECTROCARDIOGRAM TRACING: CPT | Performed by: STUDENT IN AN ORGANIZED HEALTH CARE EDUCATION/TRAINING PROGRAM

## 2022-09-10 PROCEDURE — 85379 FIBRIN DEGRADATION QUANT: CPT | Performed by: EMERGENCY MEDICINE

## 2022-09-10 PROCEDURE — 36415 COLL VENOUS BLD VENIPUNCTURE: CPT | Performed by: EMERGENCY MEDICINE

## 2022-09-10 PROCEDURE — 93005 ELECTROCARDIOGRAM TRACING: CPT | Performed by: EMERGENCY MEDICINE

## 2022-09-10 PROCEDURE — 85025 COMPLETE CBC W/AUTO DIFF WBC: CPT | Performed by: EMERGENCY MEDICINE

## 2022-09-10 PROCEDURE — 93971 EXTREMITY STUDY: CPT | Mod: RT

## 2022-09-10 RX ORDER — OXYCODONE AND ACETAMINOPHEN 5; 325 MG/1; MG/1
1 TABLET ORAL ONCE
Status: COMPLETED | OUTPATIENT
Start: 2022-09-10 | End: 2022-09-10

## 2022-09-10 RX ORDER — OXYCODONE AND ACETAMINOPHEN 5; 325 MG/1; MG/1
1 TABLET ORAL EVERY 6 HOURS PRN
Qty: 8 TABLET | Refills: 0 | Status: SHIPPED | OUTPATIENT
Start: 2022-09-10 | End: 2022-09-13

## 2022-09-10 RX ADMIN — OXYCODONE HYDROCHLORIDE AND ACETAMINOPHEN 1 TABLET: 5; 325 TABLET ORAL at 20:08

## 2022-09-10 ASSESSMENT — ACTIVITIES OF DAILY LIVING (ADL): ADLS_ACUITY_SCORE: 35

## 2022-09-10 NOTE — DISCHARGE INSTRUCTIONS
You were seen here today for evaluation of leg pain.  As we discussed, the biggest concern is for a blood clot.  Your exam is reassuring but you need to follow-up with your primary care provider to have labs taken to check your electrolytes and an ultrasound of that leg.    You may take Tylenol and ibuprofen for pain/fever, do not exceed 4000 mg of Tylenol per day or 3200 mg ofibuprofen per day.    Follow-up with your doctor as soon as possible.  Return here for any new or worsening symptoms like worsening pain in the leg, loss of sensation or function in your leg, worsening chest pain or shortness of breath, fever, or any other symptoms that concern you.

## 2022-09-10 NOTE — ED PROVIDER NOTES
EMERGENCY DEPARTMENT ENCOUNTER      NAME: Renata Staton  AGE: 26 year old female  YOB: 1995  MRN: [id number]  EVALUATION DATE & TIME: No admission date for patient encounter.    PCP: Andria Hernández    ED PROVIDER: Lyndsey Tyler PA-C      Chief Complaint   Patient presents with     Leg Pain         FINAL IMPRESSION:  1. Pain of right lower extremity          ED COURSE & MEDICAL DECISION MAKING:    Pertinent Labs & Imaging studies reviewed. (See chart for details)    26 year old female presents to the Emergency Department for evaluation of leg pain.    Physical exam is remarkable for a generally well-appearing female who is in no acute distress.  Heart and lung sounds are clear diffusely throughout.  She has normal range of motion of all joints of the right leg, no tenderness to palpation noted anywhere in the leg.  Compartments of the upper and lower leg are soft.  She has good distal sensation in the foot, strong PT pulse identified, capillary refill is less than 2 seconds.  Vital signs are stable and she is afebrile.    I did recommend lab work including BMP and magnesium to rule out electrolyte derangement causing her symptoms as well as an ultrasound of the right lower leg.  The patient has to work early tomorrow and it is her son's birthday party so she does not want to stay in the ER any further tonight.  We discussed the risks of foregoing including DVT or subsequent PE although my suspicion for DVT as the cause of her symptoms is very low, she has no risk factors for DVT other than smoking and her pain is essentially her whole leg rather than a focal area.  She also had a negative ddimer on 08/15/22. She is neurovascularly intact on exam and there is no clinical evidence of septic arthritis, cellulitis, or compartment syndrome.  Discussed management of her symptoms at home including Tylenol and ibuprofen, patient will plan to follow-up with her primary care provider next week.  Advised  return here for any new or worsening symptoms.  The patient is agreeable with this treatment plan and verbalized her understanding.    ED Course   10:40 PM Performed my initial history and physical exam. Discussed workup in the emergency department, management of symptoms, and likely disposition. I discussed the plan for discharge with the patient, and patient is agreeable. We discussed supportive cares at home and reasons for return to the ER including new or worsening symptoms - all questions and concerns addressed. Patient to be discharged by RN.    At the conclusion of the encounter I discussed the results of all of the tests and the disposition. The questions were answered. The patient or family acknowledged understanding and was agreeable with the care plan.     Voice recognition software was used in the creation of this note. Any grammatical or nonsensical errors are due to inherent errors with the software and are not the intention of the writer.     MEDICATIONS GIVEN IN THE EMERGENCY:  Medications - No data to display    NEW PRESCRIPTIONS STARTED AT TODAY'S ER VISIT  Discharge Medication List as of 9/9/2022 10:55 PM               =================================================================    HPI    Patient information was obtained from: Patient    Use of : N/A    Renata Staton is a 26 year old female with PMH of anemia, pancreatitis, kidney disease, vitamin b12 deficiency who presents to the ED via walk-in for evaluation of leg pain.     The patient reports that for the last 4 days, she has had pain in her right leg that goes from her thigh just distal to the groin all the way down to her ankle.  The pain is burning in nature and seems to be worse at night.  She has had episodes of pain like this before but never this painful.  The pain is only in her right leg.  She endorses some associated tingling but notes this is not necessarily unusual for her.  She also endorses intermittent  "episodes of chest pressure that she describes as \"like an elephant is sitting on my chest\" but has been having these episodes for a long time and has been worked up in the past for them with no abnormal findings, she does not have this currently.    She denies any fevers, chills, chest pain, hemoptysis, recent long travel or surgery, estrogen use, or history of DVT/PE.      REVIEW OF SYSTEMS   Review of Systems   Constitutional: Negative for chills and fever.   Respiratory: Negative for shortness of breath.    Cardiovascular: Negative for chest pain.   Musculoskeletal: Positive for myalgias (Right leg pain).   Skin: Negative for rash and wound.   Neurological: Negative for syncope, weakness and numbness.       All other systems reviewed and are negative unless noted in HPI.      PAST MEDICAL HISTORY:  Past Medical History:   Diagnosis Date     Anxiety      Chronic abdominal pain      Chronic pelvic pain in female      Depression      History of anesthesia complications     family issues slow to wake up after surgery     Migraine        PAST SURGICAL HISTORY:  Past Surgical History:   Procedure Laterality Date     ENDOSCOPIC RETROGRADE CHOLANGIOPANCREATOGRAM N/A 12/18/2019    Procedure: ENDOSCOPIC RETROGRADE CHOLANGIOPANCREATOGRAPHY with billary sphinctomy and stone extraction and  billary stent placement with epinephrine injection ;  Surgeon: Dustin Betancourt MD;  Location: [facility];  Service: Gastroenterology     ENDOSCOPIC RETROGRADE CHOLANGIOPANCREATOGRAM N/A 2/28/2020    Procedure: ENDOSCOPIC RETROGRADE CHOLANGIOPANCREATOGRAPHY, STENT REMOVAL, SLUDGE REMOVAL;  Surgeon: Dustin Betancourt MD;  Location: [facility] OR;  Service: Gastroenterology     LAPAROSCOPIC CHOLECYSTECTOMY N/A 12/11/2019    Procedure: CHOLECYSTECTOMY, LAPAROSCOPIC;  Surgeon: Samia Portillo MD;  Location: [facility] OR;  Service: General     ID LAP,DIAGNOSTIC ABDOMEN N/A 2/20/2018    Procedure: DIAGNOSTIC LAPAROSCOPY, " LYSIS OF  ADHESIONS;  Surgeon: Manish Lyons MD;  Location: [facility] OR;  Service: Gynecology     XR ERCP BILIARY ONLY  12/18/2019     XR ERCP BILIARY ONLY  2/28/2020       CURRENT MEDICATIONS:    cholecalciferol 50 MCG (2000 UT) CAPS  cyanocobalamin (VITAMIN B-12) 1000 MCG tablet  dimenhyDRINATE (DRAMAMINE) 50 MG CHEW  hydrOXYzine (VISTARIL) 25 MG capsule  ondansetron (ZOFRAN-ODT) 8 MG ODT tab  pseudoePHEDrine (SUDAFED) 30 MG tablet  sodium chloride (OCEAN) 0.65 % nasal spray        ALLERGIES:  Allergies   Allergen Reactions     Penicillin G Anaphylaxis     Sumatriptan Hives, Swelling and Anxiety     Went to ED after taking 25mg for first time, no objective findings, likely not true allergy     Augmentin Hives     Iohexol Hives and Itching     Pt had hives after premedication.  She had a prior reaction in 2020 at the ER given Iohexol and today was given Iohexol again with premedication of Prednistone 50 mg 13, 7, 1 hour prior.  Pt developed hives on head and abdomen.  Stable vitals HR 95, BP 90/57, O2 SAT 99%.  Pt given water and time and symptoms improved.  No breathing or respiratory issues.   Pt check by RN from primary care clinic.  Benedict     Peanut Oil Unknown     Peanut [Peanut Oil] Swelling     Throat swelling       FAMILY HISTORY:  Family History   Problem Relation Age of Onset     Cancer Mother      Asthma Mother      Asthma Sister      Mental Illness Sister      Heart Disease Sister        SOCIAL HISTORY:   Social History     Socioeconomic History     Marital status: Single   Tobacco Use     Smoking status: Current Every Day Smoker     Packs/day: 0.50     Smokeless tobacco: Never Used   Substance and Sexual Activity     Alcohol use: No     Comment: Alcoholic Drinks/day: rare     Drug use: Not Currently     Sexual activity: Yes     Partners: Male       VITALS:  Patient Vitals for the past 24 hrs:   BP Temp Temp src Pulse Resp SpO2 Height Weight   09/09/22 2256 101/75 -- -- 78 16 99 % -- --  "  09/09/22 2134 121/82 98.4  F (36.9  C) Oral 85 16 99 % 1.651 m (5' 5\") 51.7 kg (114 lb)       PHYSICAL EXAM    VITAL SIGNS: /75   Pulse 78   Temp 98.4  F (36.9  C) (Oral)   Resp 16   Ht 1.651 m (5' 5\")   Wt 51.7 kg (114 lb)   LMP 08/07/2022 (Approximate)   SpO2 99%   BMI 18.97 kg/m    General Appearance: Alert, cooperative, normal speech and facial symmetry, appears stated age, the patient does not appear in distress  Head:  Normocephalic, without obvious abnormality, atraumatic  Cardio:  Regular rate and rhythm, S1 and S2 normal, no murmur, rub    or gallop, 2+ pulses symmetric in all extremities  Pulm:  Clear to auscultation bilaterally, respirations unlabored with no accessory muscle use  Extremities: Normal range of motion of all joints of the right leg, no tenderness to palpation noted anywhere in the leg.  Compartments of the upper and lower leg are soft.  She has good distal sensation in the foot, strong PT pulse identified, capillary refill is less than 2 seconds.  Neuro: Patient is awake, alert, and responsive to voice. No gross motor weaknesses or sensory loss; moves all extremities.     LAB:  All pertinent labs reviewed and interpreted.  Labs Ordered and Resulted from Time of ED Arrival to Time of ED Departure - No data to display    RADIOLOGY:  Reviewed all pertinent imaging. Please see official radiology report.  No orders to display         Lyndsey Tyler PA-C  Emergency Medicine  [facility] EMERGENCY DEPARTMENT  [facility] [facility] [id number] [phone]  Dept: [phone]       Lyndsey Tyler PA-C  09/09/22 2258    "

## 2022-09-10 NOTE — ED TRIAGE NOTES
Pt arrives c/o right leg pain that happens mostly at night. The pain starts at her thigh and goes down to her toes. Pt denies blood thinners. Pt is a smoker and concerned for a blood clot. Leg is not red or swollen. Positive CMS     Triage Assessment     Row Name 09/09/22 0273       Triage Assessment (Adult)    Airway WDL WDL       Respiratory WDL    Respiratory WDL WDL       Skin Circulation/Temperature WDL    Skin Circulation/Temperature WDL WDL       Cardiac WDL    Cardiac WDL WDL       Peripheral/Neurovascular WDL    Peripheral Neurovascular WDL WDL;capillary refill    Capillary Refill, General less than/equal to 3 secs    Capillary Refill, LUE less than/equal to 3 secs    Capillary Refill, RUE less than/equal to 3 secs    Capillary Refill, LLE less than/equal to 3 secs    Capillary Refill, RLE less than/equal to 3 secs       Cognitive/Neuro/Behavioral WDL    Cognitive/Neuro/Behavioral WDL WDL

## 2022-09-11 NOTE — ED PROVIDER NOTES
EMERGENCY DEPARTMENT ENCOUNTER      NAME: Rentaa Staton  AGE: 26 year old female  YOB: 1995  MRN: [id number]  EVALUATION DATE & TIME: 9/10/2022  7:27 PM    PCP: Andria Hernández    ED PROVIDER: Sherron Galdamez M.D.      Chief Complaint   Patient presents with     Chest Pain     Shortness of Breath     Leg Pain     FINAL IMPRESSION:  1. Pain of right lower leg    2. Shortness of breath      ED COURSE & MEDICAL DECISION MAKING:    Pertinent Labs & Imaging studies reviewed. (See chart for details)  7:44 PM I met with the patient for the initial interview and physical examination. Discussed plan for treatment and workup in the ED.    ED Course as of 09/10/22 2149   Sat Sep 10, 2022   2004 Patient is a 26-year-old female who comes in today for evaluation of some right sided leg pain as well as some chest pain and shortness of breath.  She has some radiculopathy down that right leg as well.  She was initially seen yesterday and could not stay because it was taking too long to get everything done.  She came back today concerned about DVT although she has no risk factors for DVT.  She never had a blood clot before.  She is not on estrogen.  On my exam she has some tenderness to the right calf but no swelling.  I think this likely represents DVT but we will get an ultrasound and check some basic labs.  She does have some chest pain and shortness of breath.  I will check a D-dimer but I did not feel strongly that we need to go straight to chest CT if her D-dimer is negative and her ultrasound is negative.  I think in that case a chest x-ray would suffice.  My suspicion for something emergent is not superhigh at this point in time.  I saw her for similar presentation about a month ago and her work-up was unremarkable for an emergent cause.  I will treat her with some Percocet for pain while we wait for the thing to come back.  She is in agreement with the plan.   2031 The patient's D-dimer came back at  0.28.  I ordered a chest x-ray.  I do not think CT imaging is warranted.   2138 Patient's work-up came back unremarkable.  I will discussed the result with her and we will work on getting her discharged home.   2140 I discussed the results and plan for discharge with the patient.  She is in agreement.  I will send her home with a few doses of Percocet to help with her discomfort.  She feels much better after that.  She needs some help establishing care with a primary doctor so I can put in a referral for that as well.     At the conclusion of the encounter I discussed  the results of all of the tests and the disposition with patient.   All questions were answered.  The patient acknowledged understanding and was involved in the decision making regarding the overall care plan.      I discussed with patient the utility, limitations and findings of the exam/interventions/studies done during this visit as well as the list of differential diagnosis and symptoms to monitor/return to ER for.  Additional verbal discharge instructions were provided.     MEDICATIONS GIVEN IN THE EMERGENCY:  Medications   oxyCODONE-acetaminophen (PERCOCET) 5-325 MG per tablet 1 tablet (1 tablet Oral Given 9/10/22 2008)     NEW PRESCRIPTIONS STARTED AT TODAY'S ER VISIT  New Prescriptions    OXYCODONE-ACETAMINOPHEN (PERCOCET) 5-325 MG TABLET    Take 1 tablet by mouth every 6 hours as needed for pain      =================================================================    HPI    Triage Note: Patient arrives to triage with chief complaint of right leg pain which has been ongoing for five days now.  Patient was seen here last night, but never completed imaging/Ultrasound.  C/o chest pain and shortness of breath which started a couple of hours ago.  Reports pain is sharp in center of chest when taking a breath and radiates up into left side of neck.  EKG done at 1908 and seen by provider.  Alert and oriented x4.     Patient information was obtained  from: Patient     Use of : N/A      Renata Staton is a 26 year old female with no contributory medical history who presents to this ED by private vehicle for evaluation of leg and chest pain.     Patient reports persisting severe right leg pain with associated numbness in the right calf radiating down to the right foot for the past 5 days. She also notes that she started to experience shortness of breath and chest pain a few hours prior to arrival. Patient has been taking tylenol and ibuprofen with no relief. Patient denies history of blood clots or chance of pregnancy. Patient denies cough, fever, back pain, or additional symptoms or complaints at this time.     Positive for leg pain, leg numbness, shortness of breath, and chest pain     REVIEW OF SYSTEMS   Except as stated in the HPI all other systems reviewed and are negative.    PAST MEDICAL HISTORY:  Past Medical History:   Diagnosis Date     Anxiety      Chronic abdominal pain      Chronic pelvic pain in female      Depression      History of anesthesia complications     family issues slow to wake up after surgery     Migraine        PAST SURGICAL HISTORY:  Past Surgical History:   Procedure Laterality Date     ENDOSCOPIC RETROGRADE CHOLANGIOPANCREATOGRAM N/A 12/18/2019    Procedure: ENDOSCOPIC RETROGRADE CHOLANGIOPANCREATOGRAPHY with billary sphinctomy and stone extraction and  billary stent placement with epinephrine injection ;  Surgeon: Dustin Betancourt MD;  Location: [facility];  Service: Gastroenterology     ENDOSCOPIC RETROGRADE CHOLANGIOPANCREATOGRAM N/A 2/28/2020    Procedure: ENDOSCOPIC RETROGRADE CHOLANGIOPANCREATOGRAPHY, STENT REMOVAL, SLUDGE REMOVAL;  Surgeon: Dustin Betancourt MD;  Location: [facility] OR;  Service: Gastroenterology     LAPAROSCOPIC CHOLECYSTECTOMY N/A 12/11/2019    Procedure: CHOLECYSTECTOMY, LAPAROSCOPIC;  Surgeon: Samia Portillo MD;  Location: [facility] OR;  Service: General     [place]  LAP,DIAGNOSTIC ABDOMEN N/A 2/20/2018    Procedure: DIAGNOSTIC LAPAROSCOPY, LYSIS OF  ADHESIONS;  Surgeon: Manish Lyons MD;  Location: [facility] OR;  Service: Gynecology     XR ERCP BILIARY ONLY  12/18/2019     XR ERCP BILIARY ONLY  2/28/2020       CURRENT MEDICATIONS:    No current facility-administered medications for this encounter.    Current Outpatient Medications:      oxyCODONE-acetaminophen (PERCOCET) 5-325 MG tablet, Take 1 tablet by mouth every 6 hours as needed for pain, Disp: 8 tablet, Rfl: 0     cholecalciferol 50 MCG (2000 UT) CAPS, Take 2,000 Units by mouth daily, Disp: , Rfl:      cyanocobalamin (VITAMIN B-12) 1000 MCG tablet, Take 1,000 mcg by mouth daily, Disp: , Rfl:      dimenhyDRINATE (DRAMAMINE) 50 MG CHEW, Take 1 tablet (50 mg) by mouth every 6 hours as needed for other (nausea), Disp: 10 tablet, Rfl: 0     hydrOXYzine (VISTARIL) 25 MG capsule, Take 1 capsule (25 mg) by mouth 3 times daily as needed for anxiety, Disp: 20 capsule, Rfl: 0     ondansetron (ZOFRAN-ODT) 8 MG ODT tab, Take 1 tablet (8 mg) by mouth every 8 hours as needed, Disp: 12 tablet, Rfl: 0     pseudoePHEDrine (SUDAFED) 30 MG tablet, Take 1 tablet (30 mg) by mouth every 4 hours as needed for congestion, Disp: 20 tablet, Rfl: 0     sodium chloride (OCEAN) 0.65 % nasal spray, As needed for nasal congestion., Disp: 30 mL, Rfl: 0    ALLERGIES:  Allergies   Allergen Reactions     Penicillin G Anaphylaxis     Sumatriptan Hives, Swelling and Anxiety     Went to ED after taking 25mg for first time, no objective findings, likely not true allergy     Augmentin Hives     Iohexol Hives and Itching     Pt had hives after premedication.  She had a prior reaction in 2020 at the ER given Iohexol and today was given Iohexol again with premedication of Prednistone 50 mg 13, 7, 1 hour prior.  Pt developed hives on head and abdomen.  Stable vitals HR 95, BP 90/57, O2 SAT 99%.  Pt given water and time and symptoms improved.  No  "breathing or respiratory issues.   Pt check by RN from primary care clinic.  Benedict     Peanut Oil Unknown     Peanut [Peanut Oil] Swelling     Throat swelling       FAMILY HISTORY:  Family History   Problem Relation Age of Onset     Cancer Mother      Asthma Mother      Asthma Sister      Mental Illness Sister      Heart Disease Sister        SOCIAL HISTORY:   Social History     Socioeconomic History     Marital status: Single   Tobacco Use     Smoking status: Current Every Day Smoker     Packs/day: 0.50     Smokeless tobacco: Never Used   Substance and Sexual Activity     Alcohol use: No     Comment: Alcoholic Drinks/day: rare     Drug use: Not Currently     Sexual activity: Yes     Partners: Male       PHYSICAL EXAM    VITAL SIGNS: BP 94/60   Pulse 71   Temp 98.6  F (37  C) (Oral)   Resp 20   Ht 1.651 m (5' 5\")   Wt 51.7 kg (114 lb)   LMP 08/07/2022 (Approximate)   SpO2 96%   BMI 18.97 kg/m     GENERAL: Awake, Alert, answering questions, Well nourished, mildly anxious appearing  HEENT: Normal cephalic, Atraumatic, bilateral external ears normal, No scleral icterus, mask in place  NECK: No obvious swelling or abnormality, No stridor  PULMONARY:Normal and symmetric breath sounds, No respiratory distress, Lungs clear to auscultation bilaterally. No wheezing  CARDIOVASCULAR: Regular rate and rhythm, Distal pulses present and normal.  ABDOMINAL: Soft, Nondistended, Nontender, No flank tenderness, No palpable masses  BACK: No tenderness.  EXTREMITIES: Moves all extremities spontaneously, warm, no edema, No major deformities. Tenderness to right calf without swelling  NEURO: No facial droop, normal motor function, Normal speech   PSYCH: Normal mood and affect  SKIN: No rashes on visualized skin, dry, warm     LAB:  All pertinent labs reviewed and interpreted.  Results for orders placed or performed during the hospital encounter of 09/10/22   US Lower Extremity Venous Duplex Right    Impression    IMPRESSION:  1.  " No deep venous thrombosis in the right lower extremity.   XR Chest Port 1 View    Impression    IMPRESSION: Negative chest.   Basic metabolic panel   Result Value Ref Range    Sodium 139 136 - 145 mmol/L    Potassium 3.5 3.5 - 5.0 mmol/L    Chloride 108 (H) 98 - 107 mmol/L    Carbon Dioxide (CO2) 21 (L) 22 - 31 mmol/L    Anion Gap 10 5 - 18 mmol/L    Urea Nitrogen 11 8 - 22 mg/dL    Creatinine 0.90 0.60 - 1.10 mg/dL    Calcium 9.6 8.5 - 10.5 mg/dL    Glucose 84 70 - 125 mg/dL    GFR Estimate >90 >60 mL/min/1.73m2   Result Value Ref Range    Troponin I <0.01 0.00 - 0.29 ng/mL   D dimer quantitative   Result Value Ref Range    D-Dimer Quantitative 0.28 0.00 - 0.50 ug/mL FEU   CBC with platelets and differential   Result Value Ref Range    WBC Count 5.8 4.0 - 11.0 10e3/uL    RBC Count 4.06 3.80 - 5.20 10e6/uL    Hemoglobin 13.0 11.7 - 15.7 g/dL    Hematocrit 36.3 35.0 - 47.0 %    MCV 89 78 - 100 fL    MCH 32.0 26.5 - 33.0 pg    MCHC 35.8 31.5 - 36.5 g/dL    RDW 14.0 10.0 - 15.0 %    Platelet Count 253 150 - 450 10e3/uL    % Neutrophils 55 %    % Lymphocytes 37 %    % Monocytes 8 %    % Eosinophils 0 %    % Basophils 0 %    % Immature Granulocytes 0 %    NRBCs per 100 WBC 0 <1 /100    Absolute Neutrophils 3.1 1.6 - 8.3 10e3/uL    Absolute Lymphocytes 2.1 0.8 - 5.3 10e3/uL    Absolute Monocytes 0.5 0.0 - 1.3 10e3/uL    Absolute Eosinophils 0.0 0.0 - 0.7 10e3/uL    Absolute Basophils 0.0 0.0 - 0.2 10e3/uL    Absolute Immature Granulocytes 0.0 <=0.4 10e3/uL    Absolute NRBCs 0.0 10e3/uL       RADIOLOGY:      XR Chest Port 1 View   Final Result   IMPRESSION: Negative chest.      US Lower Extremity Venous Duplex Right   Final Result   IMPRESSION:   1.  No deep venous thrombosis in the right lower extremity.          EKG:    Date and time: September 10, 2022 at 1908  Rate: 85 bpm  Rhythm: Sinus rhythm  AZ interval: 114 ms  QRS interval: 78 ms  QT/QTc: 362/430 ms  ST changes or T wave changes: No acute ST or T wave  abnormality  Change from prior ECG: No significant change from prior  I have independently reviewed and interpreted this EKG.     I, Eros Adrianna, am serving as a scribe to document services personally performed by Dr. Galdamez based on my observation and the provider's statements to me. I, Sherron Galdamez MD attest that Eros Rodas is acting in a scribe capacity, has observed my performance of the services and has documented them in accordance with my direction.    Sherron Galdamez M.D.  Emergency Medicine  [facility] EMERGENCY DEPARTMENT  [address] [place] [id number]  [phone]  Dept: [phone]     Sherron Galdamez MD  09/10/22 9127

## 2022-09-11 NOTE — ED TRIAGE NOTES
Patient arrives to triage with chief complaint of right leg pain which has been ongoing for five days now.  Patient was seen here last night, but never completed imaging/Ultrasound.  C/o chest pain and shortness of breath which started a couple of hours ago.  Reports pain is sharp in center of chest when taking a breath and radiates up into left side of neck.  EKG done at 1908 and seen by provider.  Alert and oriented x4.

## 2022-09-11 NOTE — DISCHARGE INSTRUCTIONS
You were seen in the Emergency Department today for evaluation of right leg pain and shortness of breath.  Your lab work showed no cause of your symptoms. Your imaging studies showed no significant cause of your symptoms. You were prescribed a few doses of Percocet to help with your discomfort. You should take all medications as prescribed.  Follow up with your primary care physician to ensure resolution of symptoms. Return if you have new or worsening symptoms.

## 2022-09-19 LAB
ATRIAL RATE - MUSE: 85 BPM
DIASTOLIC BLOOD PRESSURE - MUSE: NORMAL MMHG
INTERPRETATION ECG - MUSE: NORMAL
P AXIS - MUSE: 60 DEGREES
PR INTERVAL - MUSE: 114 MS
QRS DURATION - MUSE: 78 MS
QT - MUSE: 362 MS
QTC - MUSE: 430 MS
R AXIS - MUSE: 82 DEGREES
SYSTOLIC BLOOD PRESSURE - MUSE: NORMAL MMHG
T AXIS - MUSE: 60 DEGREES
VENTRICULAR RATE- MUSE: 85 BPM

## 2022-09-25 ENCOUNTER — HEALTH MAINTENANCE LETTER (OUTPATIENT)
Age: 27
End: 2022-09-25

## 2023-01-02 ENCOUNTER — VIRTUAL VISIT (OUTPATIENT)
Dept: FAMILY MEDICINE | Facility: CLINIC | Age: 28
End: 2023-01-02
Payer: COMMERCIAL

## 2023-01-02 DIAGNOSIS — Z71.6 TOBACCO ABUSE COUNSELING: Primary | ICD-10-CM

## 2023-01-02 PROCEDURE — 99203 OFFICE O/P NEW LOW 30 MIN: CPT | Mod: GT | Performed by: FAMILY MEDICINE

## 2023-01-02 RX ORDER — VARENICLINE TARTRATE 1 MG/1
1 TABLET, FILM COATED ORAL 2 TIMES DAILY
Qty: 180 TABLET | Refills: 0 | Status: SHIPPED | OUTPATIENT
Start: 2023-01-02 | End: 2023-04-02

## 2023-01-02 RX ORDER — VARENICLINE TARTRATE 0.5 MG/1
TABLET, FILM COATED ORAL
Qty: 12 TABLET | Refills: 0 | Status: SHIPPED | OUTPATIENT
Start: 2023-01-02 | End: 2023-01-10

## 2023-01-02 ASSESSMENT — ENCOUNTER SYMPTOMS
SHORTNESS OF BREATH: 0
PALPITATIONS: 0
HEADACHES: 0
CONSTITUTIONAL NEGATIVE: 1

## 2023-01-02 NOTE — PROGRESS NOTES
Gloria is a 27 year old who is being evaluated via a billable video visit.      How would you like to obtain your AVS? MyChart  If the video visit is dropped, the invitation should be resent by: Text to cell phone: [phone]  Will anyone else be joining your video visit? No        Assessment and Plan    (Z71.6) Tobacco abuse counseling  (primary encounter diagnosis)  Comment: advised she can still use nictoine replacement if needed.  Warning for derepression exacerbation given and if her mood gets worse after starting she should stop med immediately and seek help  Plan: REVIEW OF HEALTH MAINTENANCE PROTOCOL ORDERS,         varenicline (APO-VARENICLINE) 0.5 MG tablet,         varenicline (APO-VARENICLINE) 1 MG tablet              RTC in 1m prn    Yovani Magallanes MD      Subjective   Gloria is a 27 year old, presenting for the following health issues:  No chief complaint on file.    PT is wanting to discuss smoking cessation options.    History of Present Illness       Reason for visit:  Quit smoking options    She eats 0-1 servings of fruits and vegetables daily.She consumes 3 sweetened beverage(s) daily.She exercises with enough effort to increase her heart rate 10 to 19 minutes per day.  She exercises with enough effort to increase her heart rate 4 days per week. She is missing 2 dose(s) of medications per week.  She is not taking prescribed medications regularly due to remembering to take.     Started using patch last week, 21 mg patch and 14 mg patch helped but caused some side effects, 7 mg didn't seem to help.  Wondering about other options.  Does have depression.  Lives with his boyfriend, but he does not smoke.    Review of Systems   Constitutional: Negative.    Eyes: Negative for visual disturbance.   Respiratory: Negative for shortness of breath.    Cardiovascular: Negative for chest pain, palpitations and peripheral edema.   Neurological: Negative for headaches.            Objective           Vitals:  No  vitals were obtained today due to virtual visit.    Physical Exam   GENERAL: Healthy, alert and no distress  EYES: Eyes grossly normal to inspection.  No discharge or erythema, or obvious scleral/conjunctival abnormalities.  RESP: No audible wheeze, cough, or visible cyanosis.  No visible retractions or increased work of breathing.    SKIN: Visible skin clear. No significant rash, abnormal pigmentation or lesions.  NEURO: Cranial nerves grossly intact.  Mentation and speech appropriate for age.  PSYCH: Mentation appears normal, affect normal/bright, judgement and insight intact, normal speech and appearance well-groomed.                Video-Visit Details    Type of service:  Video Visit   Video Start Time: 1113  Video End Time:11:22 AM    Originating Location (pt. Location): Home  Distant Location (provider location):  On-site  Platform used for Video Visit: [employer]

## 2023-01-05 ENCOUNTER — APPOINTMENT (OUTPATIENT)
Dept: INTERNAL MEDICINE | Facility: CLINIC | Age: 28
End: 2023-01-05
Payer: COMMERCIAL

## 2023-01-09 ENCOUNTER — APPOINTMENT (OUTPATIENT)
Dept: RADIOLOGY | Facility: CLINIC | Age: 28
End: 2023-01-09
Attending: EMERGENCY MEDICINE
Payer: COMMERCIAL

## 2023-01-09 ENCOUNTER — HOSPITAL ENCOUNTER (EMERGENCY)
Facility: CLINIC | Age: 28
Discharge: HOME | End: 2023-01-09
Attending: EMERGENCY MEDICINE | Admitting: EMERGENCY MEDICINE
Payer: COMMERCIAL

## 2023-01-09 VITALS
BODY MASS INDEX: 20.18 KG/M2 | WEIGHT: 121.1 LBS | HEART RATE: 102 BPM | DIASTOLIC BLOOD PRESSURE: 66 MMHG | OXYGEN SATURATION: 99 % | TEMPERATURE: 99.7 F | HEIGHT: 65 IN | RESPIRATION RATE: 16 BRPM | SYSTOLIC BLOOD PRESSURE: 99 MMHG

## 2023-01-09 DIAGNOSIS — R00.2 PALPITATIONS: ICD-10-CM

## 2023-01-09 LAB
ALBUMIN SERPL-MCNC: 4.4 G/DL (ref 3.5–5)
ALP SERPL-CCNC: 92 U/L (ref 45–120)
ALT SERPL W P-5'-P-CCNC: 25 U/L (ref 0–45)
ANION GAP SERPL CALCULATED.3IONS-SCNC: 10 MMOL/L (ref 5–18)
AST SERPL W P-5'-P-CCNC: 17 U/L (ref 0–40)
ATRIAL RATE - MUSE: 112 BPM
BASOPHILS # BLD AUTO: 0 10E3/UL (ref 0–0.2)
BASOPHILS NFR BLD AUTO: 0 %
BILIRUB SERPL-MCNC: 0.6 MG/DL (ref 0–1)
BUN SERPL-MCNC: 10 MG/DL (ref 8–22)
CALCIUM SERPL-MCNC: 9.7 MG/DL (ref 8.5–10.5)
CHLORIDE BLD-SCNC: 106 MMOL/L (ref 98–107)
CO2 SERPL-SCNC: 25 MMOL/L (ref 22–31)
CREAT SERPL-MCNC: 0.77 MG/DL (ref 0.6–1.1)
D DIMER PPP FEU-MCNC: <=0.27 UG/ML FEU (ref 0–0.5)
DIASTOLIC BLOOD PRESSURE - MUSE: NORMAL MMHG
EOSINOPHIL # BLD AUTO: 0.1 10E3/UL (ref 0–0.7)
EOSINOPHIL NFR BLD AUTO: 2 %
ERYTHROCYTE [DISTWIDTH] IN BLOOD BY AUTOMATED COUNT: 11.1 % (ref 10–15)
GFR SERPL CREATININE-BSD FRML MDRD: >90 ML/MIN/1.73M2
GLUCOSE BLD-MCNC: 88 MG/DL (ref 70–125)
HCG SERPL QL: NEGATIVE
HCT VFR BLD AUTO: 44.9 % (ref 35–47)
HGB BLD-MCNC: 15.5 G/DL (ref 11.7–15.7)
IMM GRANULOCYTES # BLD: 0 10E3/UL
IMM GRANULOCYTES NFR BLD: 0 %
INTERPRETATION ECG - MUSE: NORMAL
LYMPHOCYTES # BLD AUTO: 1.5 10E3/UL (ref 0.8–5.3)
LYMPHOCYTES NFR BLD AUTO: 32 %
MAGNESIUM SERPL-MCNC: 1.8 MG/DL (ref 1.8–2.6)
MCH RBC QN AUTO: 31.7 PG (ref 26.5–33)
MCHC RBC AUTO-ENTMCNC: 34.5 G/DL (ref 31.5–36.5)
MCV RBC AUTO: 92 FL (ref 78–100)
MONOCYTES # BLD AUTO: 0.3 10E3/UL (ref 0–1.3)
MONOCYTES NFR BLD AUTO: 6 %
NEUTROPHILS # BLD AUTO: 2.7 10E3/UL (ref 1.6–8.3)
NEUTROPHILS NFR BLD AUTO: 60 %
NRBC # BLD AUTO: 0 10E3/UL
NRBC BLD AUTO-RTO: 0 /100
P AXIS - MUSE: 72 DEGREES
PLATELET # BLD AUTO: 275 10E3/UL (ref 150–450)
POTASSIUM BLD-SCNC: 3.7 MMOL/L (ref 3.5–5)
PR INTERVAL - MUSE: 114 MS
PROT SERPL-MCNC: 7.9 G/DL (ref 6–8)
QRS DURATION - MUSE: 70 MS
QT - MUSE: 332 MS
QTC - MUSE: 453 MS
R AXIS - MUSE: 85 DEGREES
RBC # BLD AUTO: 4.89 10E6/UL (ref 3.8–5.2)
SODIUM SERPL-SCNC: 141 MMOL/L (ref 136–145)
SYSTOLIC BLOOD PRESSURE - MUSE: NORMAL MMHG
T AXIS - MUSE: 61 DEGREES
TROPONIN I SERPL-MCNC: <0.01 NG/ML (ref 0–0.29)
VENTRICULAR RATE- MUSE: 112 BPM
WBC # BLD AUTO: 4.5 10E3/UL (ref 4–11)

## 2023-01-09 PROCEDURE — 85025 COMPLETE CBC W/AUTO DIFF WBC: CPT | Performed by: STUDENT IN AN ORGANIZED HEALTH CARE EDUCATION/TRAINING PROGRAM

## 2023-01-09 PROCEDURE — 84703 CHORIONIC GONADOTROPIN ASSAY: CPT | Performed by: EMERGENCY MEDICINE

## 2023-01-09 PROCEDURE — 85379 FIBRIN DEGRADATION QUANT: CPT | Performed by: STUDENT IN AN ORGANIZED HEALTH CARE EDUCATION/TRAINING PROGRAM

## 2023-01-09 PROCEDURE — 84484 ASSAY OF TROPONIN QUANT: CPT | Performed by: STUDENT IN AN ORGANIZED HEALTH CARE EDUCATION/TRAINING PROGRAM

## 2023-01-09 PROCEDURE — 36415 COLL VENOUS BLD VENIPUNCTURE: CPT | Performed by: STUDENT IN AN ORGANIZED HEALTH CARE EDUCATION/TRAINING PROGRAM

## 2023-01-09 PROCEDURE — 99285 EMERGENCY DEPT VISIT HI MDM: CPT | Mod: 25

## 2023-01-09 PROCEDURE — 93005 ELECTROCARDIOGRAM TRACING: CPT | Performed by: EMERGENCY MEDICINE

## 2023-01-09 PROCEDURE — 80053 COMPREHEN METABOLIC PANEL: CPT | Performed by: STUDENT IN AN ORGANIZED HEALTH CARE EDUCATION/TRAINING PROGRAM

## 2023-01-09 PROCEDURE — 83735 ASSAY OF MAGNESIUM: CPT | Performed by: EMERGENCY MEDICINE

## 2023-01-09 PROCEDURE — 71046 X-RAY EXAM CHEST 2 VIEWS: CPT

## 2023-01-09 ASSESSMENT — ENCOUNTER SYMPTOMS
HEADACHES: 1
DIARRHEA: 0
PALPITATIONS: 1
NUMBNESS: 1

## 2023-01-09 NOTE — ED TRIAGE NOTES
Arrives to ED with c/o L sided CP x1 week. Pain radiates to LUE causing numbness. Denies cardiac hx. Reports hx of malnutrition and vitamin deficiency.      Triage Assessment     Row Name 01/09/23 1000       Triage Assessment (Adult)    Airway WDL WDL       Respiratory WDL    Respiratory WDL WDL       Skin Circulation/Temperature WDL    Skin Circulation/Temperature WDL WDL       Cardiac WDL    Cardiac WDL X;chest pain;rhythm    Pulse Rate & Regularity tachycardic       Peripheral/Neurovascular WDL    Peripheral Neurovascular WDL X;neurovascular assessment upper       LUE Neurovascular Assessment    Sensation LUE numbness present       Cognitive/Neuro/Behavioral WDL    Cognitive/Neuro/Behavioral WDL WDL

## 2023-01-09 NOTE — ED PROVIDER NOTES
EMERGENCY DEPARTMENT ENCOUNTER      NAME: Renata Staton  AGE: 27 year old female  YOB: 1995  MRN: [id number]  EVALUATION DATE & TIME: No admission date for patient encounter.    PCP: Andria Hernández    ED PROVIDER: David Finch MD        Chief Complaint   Patient presents with     Chest Pain         FINAL IMPRESSION:  1. Palpitations          ED COURSE & MEDICAL DECISION MAKING:    Pertinent Labs & Imaging studies reviewed. (See chart for details)  27 year old female presents to the Emergency Department for evaluation of palpitations and left arm numbness and intermittent chest discomfort    Plan for D-dimer, EKG, chest imaging, troponin, magnesium, CBC and metabolic panel      ED Course as of 01/09/23 1518   Mon Jan 09, 2023   1213 Differential includes ACS, pulmonary emboli, pneumonia, aortic dissection, esophageal rupture, pneumothorax, pneumonia, malignancy, pleurisy, shingles, and GERD.  Based on history and examination pulmonary emboli and aortic dissection unlikely.      1213 Troponin I: <0.01   1213 D-Dimer Quantitative: <=0.27   1213 Magnesium: 1.8   1213 WBC: 4.5   1213 Hemoglobin: 15.5     Stroke, aortic dissection, pulmonary emboli, aneurysm, ACS, ruptured esophagus highly unlikely    EKG and labs reassuring.    Wells low risk with negative D-dimer therefore PE unlikely and CTA not indicated    Chest x-ray ordered and personally reviewed was negative    Anxiety may be contributing to her symptoms    Plan for discharge home and recommend follow-up with primary care doctor for recheck    Medical Decision Making    History:    Supplemental history from: N/A    External Record(s) reviewed: Documented in HPI, if applicable.    Work Up:    Chart documentation includes differential considered and any EKGs or imaging independently interpreted by provider.    In additional to work up documented, I considered the following work up: See chart documentation, if applicable.    External  "consultation:    Discussion of management with another provider: See chart documentation, if applicable    Complicating factors:    Care impacted by chronic illness: Mental Health    Care affected by social determinants of health: Access to Medical Care    Disposition considerations: Discharge. No recommendations on prescription strength medication(s). I considered admission, but ultimately discharged patient Heart score less than 3, reassuring work-up and EKG.      10:26 AM I met with patient for initial encounter.  12:15 PM I updated patient with results and plan for discharge.      At the conclusion of the encounter I discussed the results of all of the tests and the disposition. The questions were answered. The patient or family acknowledged understanding and was agreeable with the care plan.         MEDICATIONS GIVEN IN THE EMERGENCY:  Medications - No data to display    NEW PRESCRIPTIONS STARTED AT TODAY'S ER VISIT  Discharge Medication List as of 1/9/2023 12:21 PM             =================================================================    HPI    Triage note  \"  Arrives to ED with c/o L sided CP x1 week. Pain radiates to LUE causing numbness. Denies cardiac hx. Reports hx of malnutrition and vitamin deficiency.      Triage Assessment     Row Name 01/09/23 1000       Triage Assessment (Adult)    Airway WDL WDL       Respiratory WDL    Respiratory WDL WDL       Skin Circulation/Temperature WDL    Skin Circulation/Temperature WDL WDL       Cardiac WDL    Cardiac WDL X;chest pain;rhythm    Pulse Rate & Regularity tachycardic       Peripheral/Neurovascular WDL    Peripheral Neurovascular WDL X;neurovascular assessment upper       LUE Neurovascular Assessment    Sensation LUE numbness present       Cognitive/Neuro/Behavioral WDL    Cognitive/Neuro/Behavioral WDL WDL              \"      Patient information was obtained from: Patient    Use of : N/A      Renata Staton is a 27 year old female with " a pertinent history of anxiety who presents to this ED via walk-in for evaluation of chest pain.    Patient endorses palpitations, headache, left side chest pain radiating to her left arm, and associated left arm numbness which has vania ongoing for 1 week. Patient endorses tobacco use but says she has not drank alcohol in 2 weeks. Patient is currently on her period. Patient denies any recent medication changes. She is right handed. Patient notes that her sister was born with a hole in her heart.    Patient denies diarrhea and all other relevant symptoms.      REVIEW OF SYSTEMS   Review of Systems   Cardiovascular: Positive for chest pain (left) and palpitations.   Gastrointestinal: Negative for diarrhea.   Neurological: Positive for numbness (Left arm) and headaches.   All other systems reviewed and are negative.      PAST MEDICAL HISTORY:  Past Medical History:   Diagnosis Date     Anxiety      Chronic abdominal pain      Chronic pelvic pain in female      Depression      Depressive disorder 2017     History of anesthesia complications     family issues slow to wake up after surgery     Migraine        PAST SURGICAL HISTORY:  Past Surgical History:   Procedure Laterality Date     ABDOMEN SURGERY       COLONOSCOPY  10/28     ENDOSCOPIC RETROGRADE CHOLANGIOPANCREATOGRAM N/A 12/18/2019    Procedure: ENDOSCOPIC RETROGRADE CHOLANGIOPANCREATOGRAPHY with billary sphinctomy and stone extraction and  billary stent placement with epinephrine injection ;  Surgeon: Dustin Betancourt MD;  Location: [facility] [place];  Service: Gastroenterology     ENDOSCOPIC RETROGRADE CHOLANGIOPANCREATOGRAM N/A 02/28/2020    Procedure: ENDOSCOPIC RETROGRADE CHOLANGIOPANCREATOGRAPHY, STENT REMOVAL, SLUDGE REMOVAL;  Surgeon: Dustin Betancourt MD;  Location: [facility] [place];  Service: Gastroenterology     LAPAROSCOPIC CHOLECYSTECTOMY N/A 12/11/2019    Procedure: CHOLECYSTECTOMY, LAPAROSCOPIC;  Surgeon: Samia Portillo MD;  Location: [facility];  Service: General     ID LAP,DIAGNOSTIC ABDOMEN N/A 02/20/2018    Procedure: DIAGNOSTIC LAPAROSCOPY, LYSIS OF  ADHESIONS;  Surgeon: Manish Lyons MD;  Location: [facility] Main OR;  Service: Gynecology     XR ERCP BILIARY ONLY  12/18/2019     XR ERCP BILIARY ONLY  02/28/2020           CURRENT MEDICATIONS:    cholecalciferol 50 MCG (2000 UT) CAPS  cyanocobalamin (VITAMIN B-12) 1000 MCG tablet  dimenhyDRINATE (DRAMAMINE) 50 MG CHEW  hydrOXYzine (VISTARIL) 25 MG capsule  ondansetron (ZOFRAN-ODT) 8 MG ODT tab  pseudoePHEDrine (SUDAFED) 30 MG tablet  sodium chloride (OCEAN) 0.65 % nasal spray  varenicline (APO-VARENICLINE) 0.5 MG tablet  varenicline (APO-VARENICLINE) 1 MG tablet        ALLERGIES:  Allergies   Allergen Reactions     Penicillin G Anaphylaxis     Sumatriptan Hives, Swelling and Anxiety     Went to ED after taking 25mg for first time, no objective findings, likely not true allergy     Augmentin Hives     Iohexol Hives and Itching     Pt had hives after premedication.  She had a prior reaction in 2020 at the ER given Iohexol and today was given Iohexol again with premedication of Prednistone 50 mg 13, 7, 1 hour prior.  Pt developed hives on head and abdomen.  Stable vitals HR 95, BP 90/57, O2 SAT 99%.  Pt given water and time and symptoms improved.  No breathing or respiratory issues.   Pt check by RN from primary care clinic.  Benedict     Peanut Oil Unknown     Peanut [Peanut Oil] Swelling     Throat swelling       FAMILY HISTORY:  Family History   Problem Relation Age of Onset     Cancer Mother      Asthma Mother      Asthma Sister      Mental Illness Sister      Heart Disease Sister        SOCIAL HISTORY:   Social History     Socioeconomic History     Marital status: Single   Tobacco Use     Smoking status: Every Day     Packs/day: 0.50     Years: 7.00     Pack years: 3.50     Types: Cigarettes     Smokeless tobacco: Never   Substance and Sexual Activity     Alcohol use: No      "Comment: Alcoholic Drinks/day: rare     Drug use: Never     Sexual activity: Yes     Partners: Male     Birth control/protection: Condom   Other Topics Concern     Parent/sibling w/ CABG, MI or angioplasty before 65F 55M? No       VITALS:  BP 99/66   Pulse 102   Temp 99.7  F (37.6  C) (Temporal)   Resp 16   Ht 1.651 m (5' 5\")   Wt 54.9 kg (121 lb 1.6 oz)   LMP 01/04/2023   SpO2 99%   BMI 20.15 kg/m      PHYSICAL EXAM      Vitals: BP 99/66   Pulse 102   Temp 99.7  F (37.6  C) (Temporal)   Resp 16   Ht 1.651 m (5' 5\")   Wt 54.9 kg (121 lb 1.6 oz)   LMP 01/04/2023   SpO2 99%   BMI 20.15 kg/m    General: Appears in no acute distress, awake, alert, interactive.  Eyes: Conjunctivae non-injected. Sclera anicteric.  HENT: Atraumatic.  Neck: Supple with full ROM.  Respiratory/Chest: Respiration unlabored.  Heart: 2+ radial pulses.  Abdomen: non distended  Musculoskeletal: Normal extremities. No edema or erythema. Pain with ROM of left shoulder. 5/5 strength in upper extremities.  Skin: Normal color. No rash or diaphoresis.  Neurologic: Face symmetric, moves all extremities spontaneously. Speech clear.  Psychiatric: Oriented to person, place, and time. Affect appropriate.       LAB:  All pertinent labs reviewed and interpreted.  Results for orders placed or performed during the hospital encounter of 01/09/23   Chest XR,  PA & LAT    Impression    IMPRESSION: No pleural fluid or pneumothorax. No airspace disease or edema. Normal size of the heart. Cholecystectomy.    Comprehensive metabolic panel   Result Value Ref Range    Sodium 141 136 - 145 mmol/L    Potassium 3.7 3.5 - 5.0 mmol/L    Chloride 106 98 - 107 mmol/L    Carbon Dioxide (CO2) 25 22 - 31 mmol/L    Anion Gap 10 5 - 18 mmol/L    Urea Nitrogen 10 8 - 22 mg/dL    Creatinine 0.77 0.60 - 1.10 mg/dL    Calcium 9.7 8.5 - 10.5 mg/dL    Glucose 88 70 - 125 mg/dL    Alkaline Phosphatase 92 45 - 120 U/L    AST 17 0 - 40 U/L    ALT 25 0 - 45 U/L    Protein " Total 7.9 6.0 - 8.0 g/dL    Albumin 4.4 3.5 - 5.0 g/dL    Bilirubin Total 0.6 0.0 - 1.0 mg/dL    GFR Estimate >90 >60 mL/min/1.73m2   Result Value Ref Range    Troponin I <0.01 0.00 - 0.29 ng/mL   D dimer quantitative   Result Value Ref Range    D-Dimer Quantitative <=0.27 0.00 - 0.50 ug/mL FEU   HCG QUALitative pregnancy (blood)   Result Value Ref Range    hCG Serum Qualitative Negative Negative   Result Value Ref Range    Magnesium 1.8 1.8 - 2.6 mg/dL   CBC with platelets and differential   Result Value Ref Range    WBC Count 4.5 4.0 - 11.0 10e3/uL    RBC Count 4.89 3.80 - 5.20 10e6/uL    Hemoglobin 15.5 11.7 - 15.7 g/dL    Hematocrit 44.9 35.0 - 47.0 %    MCV 92 78 - 100 fL    MCH 31.7 26.5 - 33.0 pg    MCHC 34.5 31.5 - 36.5 g/dL    RDW 11.1 10.0 - 15.0 %    Platelet Count 275 150 - 450 10e3/uL    % Neutrophils 60 %    % Lymphocytes 32 %    % Monocytes 6 %    % Eosinophils 2 %    % Basophils 0 %    % Immature Granulocytes 0 %    NRBCs per 100 WBC 0 <1 /100    Absolute Neutrophils 2.7 1.6 - 8.3 10e3/uL    Absolute Lymphocytes 1.5 0.8 - 5.3 10e3/uL    Absolute Monocytes 0.3 0.0 - 1.3 10e3/uL    Absolute Eosinophils 0.1 0.0 - 0.7 10e3/uL    Absolute Basophils 0.0 0.0 - 0.2 10e3/uL    Absolute Immature Granulocytes 0.0 <=0.4 10e3/uL    Absolute NRBCs 0.0 10e3/uL   ECG 12-LEAD WITH MUSE (LHE)   Result Value Ref Range    Systolic Blood Pressure  mmHg    Diastolic Blood Pressure  mmHg    Ventricular Rate 112 BPM    Atrial Rate 112 BPM    MN Interval 114 ms    QRS Duration 70 ms     ms    QTc 453 ms    P Axis 72 degrees    R AXIS 85 degrees    T Axis 61 degrees    Interpretation ECG       Sinus tachycardia  Otherwise normal ECG  When compared with ECG of 10-SEP-2022 19:08,  No significant change was found  Confirmed by SEE ED PROVIDER NOTE FOR, ECG INTERPRETATION ([id number]),  BR[place]ICK [place] ([id number]) on 1/9/2023 11:20:09 AM         RADIOLOGY:  Reviewed all pertinent imaging. Please see official radiology  report.  Chest XR,  PA & LAT   Final Result   IMPRESSION: No pleural fluid or pneumothorax. No airspace disease or edema. Normal size of the heart. Cholecystectomy.           EKG:    Performed at: 1/9/23 1032    Impression: Sinus tachycardia    Rate: 112 bpm  Rhythm: Sinus  Axis: 85  AK Interval: 114 ms  QRS Interval: 70 ms  QTc Interval: 453 ms  ST Changes: None  Comparison: When compared with ECG of 9/10/22 no significant change was found.    I have independently reviewed and interpreted the EKG(s) documented above.    PROCEDURES:         I, Jose Willams, am serving as a scribe to document services personally performed by Karel Finch MD based on my observation and the provider's statements to me. I, Dr. Karel Finch, attest that Jose Willams is acting in a scribe capacity, has observed my performance of the services and has documented them in accordance with my direction.    Karel Finch MD  Emergency Medicine  [facility] EMERGENCY ROOM  Formerly [facility] [facility] [id number]  [phone]      Karel Finch MD  01/09/23 4312

## 2023-01-09 NOTE — DISCHARGE INSTRUCTIONS
As we discussed your work-up not suggestive of a stroke, blood clot, heart attack, collapsed lung or pneumonia.  Recommend recheck with your doctor if your symptoms persist.

## 2023-02-08 ENCOUNTER — VIRTUAL VISIT (OUTPATIENT)
Dept: FAMILY MEDICINE | Facility: CLINIC | Age: 28
End: 2023-02-08
Payer: COMMERCIAL

## 2023-02-08 DIAGNOSIS — R10.9 FLANK PAIN: Primary | ICD-10-CM

## 2023-02-08 DIAGNOSIS — N20.0 CALCULUS OF KIDNEY: ICD-10-CM

## 2023-02-08 PROCEDURE — 99214 OFFICE O/P EST MOD 30 MIN: CPT | Mod: VID

## 2023-02-08 NOTE — PROGRESS NOTES
Gloria is a 27 year old who is being evaluated via a billable video visit.      How would you like to obtain your AVS? MyChart  If the video visit is dropped, the invitation should be resent by: Text to cell phone: [phone]  Will anyone else be joining your video visit? No        Assessment & Plan     Flank pain  Acute, worsening.  Unknown etiology.  DDx includes H. Pylori/PUD, GERD, urinary tract infection.  Ordered H. pylori testing. patient declines risk for STI testing.  DDx also includes MSK injury like rib fracture or strain.  Given patient's history of kidney stones and worsening abdominal pain ordered stat abdomen pelvis noncontrast CT. discussed signs and symptoms that would warrant returning to the ER.  - Helicobacter pylori Antigen Stool  - CT Abdomen Pelvis w/o Contrast      Ordering of each unique test         Nicotine/Tobacco Cessation:  She reports that she has been smoking cigarettes. She has a 3.50 pack-year smoking history. She has never used smokeless tobacco.  Nicotine/Tobacco Cessation Plan:         Schedule CT scan, ok to cancel if pain resolves.     No follow-ups on file.    [facility]   Gloria is a 27 year old, presenting for the following health issues:  Flank Pain    History of Present Illness       Reason for visit:  Rib pain  Symptom onset:  1-3 days ago  Symptoms include:  Agueda pain in rib or under rib. Side pain  Symptom intensity:  Severe  Symptom progression:  Staying the same  Had these symptoms before:  No  What makes it worse:  Moving  What makes it better:  No    She eats 0-1 servings of fruits and vegetables daily.She consumes 1 sweetened beverage(s) daily.She exercises with enough effort to increase her heart rate 10 to 19 minutes per day.  She exercises with enough effort to increase her heart rate 3 or less days per week. She is missing 1 dose(s) of medications per week.  She is not taking prescribed  medications regularly due to remembering to take.    Patient is not known to me.  Chart appears to have frequent ER visits, was recently seen yesterday for current issue and discharged home with sucralfate and recommended H. pylori testing.   -Since discharge patient reports her pain is worsening, though her GERD/nausea has improved.    ER visit yesterday 2/7/23  past medical history of cholecystectomy, gastritis, anxiety, gallstone pancreatitis, B12 deficiency and others presentstoday for evaluation of right upper quadrant abdominal discomfort.    grossly normal vital signs,  minimal tachycardia with a heart rate of 105. This improved spontaneously and at the time my exam she was not tachycardic. Her cardiopulmonary exam is normal. Abdomen she has tenderness without guarding or peritoneal signs in the right upper quadrant. Also some slight suprapubic tenderness as well. There is no guarding or peritoneal signs.    GI cocktail, LFTs and lipase are unremarkable. postcholecystectomy. Her CBC does not demonstrate a leukocytosis. She does not have any right lower quadrant tenderness that would be more concerning for acute appendicitis. Peptic ulcer disease and gastritis seem more likely. The patient does have a burning sensation that radiates from her stomach into her chest. Additionally her pain was significantly helped by administration of a GI cocktail. She does take omeprazole at home, 40 mg delayed release daily. She endorses compliance. I am going to add Carafate onto this to provide additional gastric protection. Patient is to follow-up with her primary medical provider if her symptoms continue she may benefit from a GI consult and repeat endoscopy. H. pylori would be a cause of peptic ulcer disease and could be diagnosed in the primary care or GI specialty clinics. I do not think the patient would benefit from a right upper quadrant ultrasound given her lack of existing gallbladder in the setting of normal liver  enzymes and lipase. I did consider a CT scan however I think in this particular instance given my higher clinical suspicion for gastritis or peptic ulcer disease this test is unlikely to provide benefit to the patient and may expose her to unnecessary radiation and healthcare cost. There are many other potential intraperitoneal etiology of the patient's pain which would include constipation, bowel obstruction, colitis, diverticulitis, hepatic vein thrombosis, and multiple others. These are all less likely and could be followed up on with imaging if the patient returns with worsening symptoms. In the meantime she is safe to discharge. Again I will send her home with Carafate and I like her to continue her PPI. She will follow-up with her primary and/or gastroenterology on an outpatient basis            Right flank pain started Sunday. Patient states she was laying in bed and might have turned wrong.   Tried tylenol; not effective.  Pain/tenderness and mild nausea with movement. Pain present at rest.  No recent trauma or falls but did gp sledding with 3 yr/old on Saturday and it was bumpy.   No dysuria. No fevers.  No vomiting, no bloating, no blood in stool. No constipation.   Heartburn has improved since ER visit.   LMP 1/31/23. Normal. +sexually active, monogamous relationship. No new partners since last testing. No vaginal discharge, itching.         History of kidney stone, passed on its own.  Reports this pain feels similar but worse.     Noticed swollen lymph node on neck this morning. Did hurt this morning but could just be that she just woke up and a week ago she did have a cold. She had sinus and nasal congestion, chills, fever and negative COVID test.          Review of Systems   Constitutional, HEENT, cardiovascular, pulmonary, gi and gu systems are negative, except as otherwise noted.      Objective       33 minutes    Vitals:  No vitals were obtained today due to virtual visit.    Physical Exam    GENERAL: Healthy, alert and no distress  EYES: Eyes grossly normal to inspection.  No discharge or erythema, or obvious scleral/conjunctival abnormalities.  RESP: No audible wheeze, cough, or visible cyanosis.  No visible retractions or increased work of breathing.    SKIN: Visible skin clear. No significant rash, abnormal pigmentation or lesions.  NEURO: Cranial nerves grossly intact.  Mentation and speech appropriate for age.  PSYCH: Mentation appears normal, affect normal/bright, judgement and insight intact, normal speech and appearance well-groomed.    No results found for any visits on 02/08/23.            Video-Visit Details    Type of service:  Video Visit   Video Start Time: 3:15 PM  Video End Time:3:48 PM    Originating Location (pt. Location): Home  Distant Location (provider location):  On-site  Platform used for Video Visit: Joselyn

## 2023-02-09 NOTE — PATIENT INSTRUCTIONS
Frandy Castro,     It was a pleasure meeting you via virtual visit today.  This is the plan we discussed:    Schedule CT scan asap to look for kidney stone  See handouts attached, especially note the signs/symptoms that would warrant returning to ER.     Follow up if not improving or worsening    Ordered H.Pylori testing. You will need to  a stool collection kit from lab and then return a stool sample.     Mark

## 2023-02-10 ENCOUNTER — HOSPITAL ENCOUNTER (OUTPATIENT)
Dept: CT IMAGING | Facility: HOSPITAL | Age: 28
Discharge: HOME | End: 2023-02-10
Payer: COMMERCIAL

## 2023-02-10 DIAGNOSIS — R10.9 FLANK PAIN: ICD-10-CM

## 2023-02-10 PROCEDURE — 74176 CT ABD & PELVIS W/O CONTRAST: CPT

## 2023-02-11 ENCOUNTER — NURSE TRIAGE (OUTPATIENT)
Dept: NURSING | Facility: CLINIC | Age: 28
End: 2023-02-11
Payer: COMMERCIAL

## 2023-02-12 NOTE — TELEPHONE ENCOUNTER
Adolfo Henson.  Yesterday she had a Cat scan showing a 4 mm kidney stone. This morning big red rash with hives from her hip bone up to the neck on the right side of her body. It itches badly. She took some Benadryl. It is now on her abdomen and right leg outer thigh. Benadryl taken @ 1pm, and again 10min ago. 1 tablet (25 mg) each time.   Hx of IV Contrast allergy, but she was not given any.   She saw [facility] physician for Cat Scan  Imaging center. Virtual visit 2/8/23 with OLGA Mascorro CNP. 2/7 urgency room.  New medication from GI MD: Lansoprazole began yesterday am. Then her second dose was today @ 5pm. (it was ordered twice daily) by HAKAN HUTTON.    Triaged to a disposition of See PCP within 3 days.   Advised to hold medication and discuss with Dr.   Advised to call HAKAN HUTTON and discuss with oncall provider. If unable to reach, then discussed option of calling Adolfo Henson and speaking to oncall provider. Recommended to see provider within 3 days as per guideline.     Shannan Palma RN Triage Nurse Advisor 8:25 PM 2/11/2023  Reason for Disposition    [1] Widespread hives, itching or facial swelling AND [2] onset > 2 hours after exposure to high-risk allergen (e.g., sting, nuts, 1st dose of antibiotic)    Additional Information    Negative: [1] Life-threatening reaction (anaphylaxis) in the past to similar substance (e.g., food, insect bite/sting, chemical, etc.) AND [2] < 2 hours since exposure    Negative: Difficulty breathing or wheezing now    Negative: [1] Swollen tongue AND [2] rapid onset    Negative: [1] Hoarseness or cough now AND [2] rapid onset    Negative: Shock suspected (e.g., cold/pale/clammy skin, too weak to stand, low BP, rapid pulse)    Negative: Difficult to awaken or acting confused (e.g., disoriented, slurred speech)    Negative: Sounds like a life-threatening emergency to the triager    Negative: [1] Bee, wasp, or yellow jacket sting AND [2] within last 24 hours    Negative:  Blood-colored, dark red or purple rash    Negative: [1] Drug rash suspected AND [2] started taking new medicine within last 2 weeks(Exception: antihistamine, eye drops, ear drops, decongestant or other OTC cough/cold medicines)    Negative: Doesn't match the SYMPTOMS of hives    Negative: Swollen tongue    Negative: [1] Widespread hives, itching or facial swelling AND [2] onset < 2 hours of exposure to high-risk allergen (e.g., sting, nuts, 1st dose of antibiotic)    Negative: Patient sounds very sick or weak to the triager    Negative: [1] MODERATE-SEVERE hives persist (i.e., hives interfere with normal activities or work) AND [2] taking antihistamine (e.g., Benadryl, Claritin) > 24 hours    Negative: [1] Hives have become worse AND [2] taking oral steroids (e.g., prednisone) > 24 hours    Negative: Joint swelling    Negative: [1] Abdominal pain AND [2] pain present > 12 hours    Negative: Fever    Protocols used: HIVES-A-

## 2023-02-17 ENCOUNTER — TELEPHONE (OUTPATIENT)
Dept: FAMILY MEDICINE | Facility: CLINIC | Age: 28
End: 2023-02-17
Payer: COMMERCIAL

## 2023-02-17 NOTE — TELEPHONE ENCOUNTER
----- Message from HELDER Figueroa CNP sent at 2/16/2023  6:01 PM CST -----  Team - please call patient to check in. Seen by me virtual visit 2/8 for flank pain, ordered CT scan to assess for kidney stone (pt had in the past as well). Would like her to have a follow up appointment with me or her PCP in about 1 month to make sure she has passed stone/pain is improving. Please review with her if she were to stop making urine, develop fevers, she should be seen in ER. She should push hydration, can take alternating pain relievers every 4 hours (ibuprofen 600 mg followed by tylenol 1000 mg 4 hours later). Follow up if her pain is uncontrolled.

## 2023-02-17 NOTE — TELEPHONE ENCOUNTER
RN called and spoke with patient.    Patient states pain has improved.  Pain is intermittent and comes and goes.    RN reviewed providers recommendations and when to go to ER.    Patient verbalized understanding.    RN assisted patient in scheduling follow up appointment.    Carroll Orta RN, BSN, PHN  [facility]

## 2023-03-08 ENCOUNTER — VIRTUAL VISIT (OUTPATIENT)
Dept: FAMILY MEDICINE | Facility: CLINIC | Age: 28
End: 2023-03-08
Payer: COMMERCIAL

## 2023-03-08 DIAGNOSIS — N20.0 CALCULUS OF KIDNEY: Primary | ICD-10-CM

## 2023-03-08 PROCEDURE — 99213 OFFICE O/P EST LOW 20 MIN: CPT | Mod: VID

## 2023-03-08 NOTE — PROGRESS NOTES
Gloria is a 27 year old who is being evaluated via a billable video visit.      How would you like to obtain your AVS? MyChart  If the video visit is dropped, the invitation should be resent by: Text to cell phone: [phone]  Will anyone else be joining your video visit? No        Assessment & Plan     Calculus of kidney  Resolved. Pts second episode of renal calculi. Advised  reducing caffeine intake and staying well hydrated to prevent. Pt reports history of polycystic kidney disease (mom) and personal history of kidney disease in childhood. No current symptoms worrisome of acute kidney injury. Recommend follow up if new symptoms develop or pain returns.     Patient is due for annual exam + immunizations, recommended scheduling with me or her PCP at her convenience.                If symptoms return.  Due for annual exam.    No follow-ups on file.    [facility]   Gloria is a 27 year old, presenting for the following health issues:  Follow Up      History of Present Illness       Reason for visit:  Follow up with kidney stone    She eats 2-3 servings of fruits and vegetables daily.She consumes 2 sweetened beverage(s) daily.She exercises with enough effort to increase her heart rate 10 to 19 minutes per day.  She exercises with enough effort to increase her heart rate 3 or less days per week. She is missing 1 dose(s) of medications per week.  She is not taking prescribed medications regularly due to remembering to take.     Seen via virtual visit 2/8/23 with CT scan completed on 2/10/23:  IMPRESSION:   1.  Nonobstructing 4 mm left renal calculus.  2.  No hydronephrosis or right-sided genitourinary calculi.    Symptoms have resolved, resolved less than 1 week after scan. No blood in urine, pain with voiding. No fevers. No edema in hands/feets     Kidney stone once in the past.   Endorses drink a lot of coffee.               Review of Systems    Constitutional, HEENT, cardiovascular, pulmonary, gi and gu systems are negative, except as otherwise noted.      Objective           Vitals:  No vitals were obtained today due to virtual visit.    Physical Exam   GENERAL: Healthy, alert and no distress  EYES: Eyes grossly normal to inspection.  No discharge or erythema, or obvious scleral/conjunctival abnormalities.  RESP: No audible wheeze, cough, or visible cyanosis.  No visible retractions or increased work of breathing.    SKIN: Visible skin clear. No significant rash, abnormal pigmentation or lesions.  NEURO: Cranial nerves grossly intact.  Mentation and speech appropriate for age.  PSYCH: Mentation appears normal, affect normal/bright, judgement and insight intact, normal speech and appearance well-groomed.    No results found for this or any previous visit (from the past 24 hour(s)).            Video-Visit Details    Type of service:  Video Visit   Video Start Time: 12:00 PM  Video End Time:12:09 PM    Originating Location (pt. Location): Home  Distant Location (provider location):  On-site  Platform used for Video Visit: Joselyn

## 2023-03-08 NOTE — PATIENT INSTRUCTIONS
Frandy Castro,     I'm happy to hear your pain has resolved and sounds like your kidney stone passed. Here is a reminder of what we discussed at your virtual visit today:    - read handouts on kidney stones, risk factors, and prevention    - I recommend you cut back on coffee and increase water intake    - schedule an annual exam with your PCP or me    - Follow up if you develop any new symptoms that could be related to kidney dysfunction:    Signs of bladder infection, such as urinating more often, burning or pain when you pee, pain above your pubic bone, blood in your urine, or trouble starting your urine stream  Signs of infection around your catheter, such as redness, swelling, warmth, or fluid leaking  Rapid weight loss or weight gain, such as 3 pounds or more in 24 hours or 6 pounds or more in 7 days  Fever above 100.4  F ( 38 C ) or as directed by your healthcare provider  Chills  Muscle aches  Night sweats  Very little or no urine output  Swelling of your hands, legs, or feet  Back pain  Abdominal pain  Extreme tiredness        Mark Mascorro

## 2023-03-15 ENCOUNTER — HOSPITAL ENCOUNTER (OUTPATIENT)
Dept: ULTRASOUND IMAGING | Facility: HOSPITAL | Age: 28
Discharge: HOME | End: 2023-03-15
Attending: PHYSICIAN ASSISTANT | Admitting: PHYSICIAN ASSISTANT
Payer: COMMERCIAL

## 2023-03-15 DIAGNOSIS — R10.11 RUQ PAIN: ICD-10-CM

## 2023-03-15 PROCEDURE — 76700 US EXAM ABDOM COMPLETE: CPT

## 2023-04-15 ENCOUNTER — HOSPITAL ENCOUNTER (EMERGENCY)
Facility: HOSPITAL | Age: 28
Discharge: HOME | End: 2023-04-15
Attending: EMERGENCY MEDICINE | Admitting: EMERGENCY MEDICINE
Payer: COMMERCIAL

## 2023-04-15 ENCOUNTER — NURSE TRIAGE (OUTPATIENT)
Dept: NURSING | Facility: CLINIC | Age: 28
End: 2023-04-15
Payer: COMMERCIAL

## 2023-04-15 VITALS
WEIGHT: 121 LBS | HEIGHT: 65 IN | SYSTOLIC BLOOD PRESSURE: 129 MMHG | OXYGEN SATURATION: 100 % | RESPIRATION RATE: 16 BRPM | BODY MASS INDEX: 20.16 KG/M2 | DIASTOLIC BLOOD PRESSURE: 72 MMHG | TEMPERATURE: 99.8 F | HEART RATE: 91 BPM

## 2023-04-15 DIAGNOSIS — G43.801 OTHER MIGRAINE WITH STATUS MIGRAINOSUS, NOT INTRACTABLE: ICD-10-CM

## 2023-04-15 LAB
ALBUMIN UR-MCNC: NEGATIVE MG/DL
ANION GAP SERPL CALCULATED.3IONS-SCNC: 11 MMOL/L (ref 7–15)
APPEARANCE UR: CLEAR
BACTERIA #/AREA URNS HPF: ABNORMAL /HPF
BASOPHILS # BLD AUTO: 0 10E3/UL (ref 0–0.2)
BASOPHILS NFR BLD AUTO: 0 %
BILIRUB UR QL STRIP: NEGATIVE
BUN SERPL-MCNC: 10.2 MG/DL (ref 6–20)
CALCIUM SERPL-MCNC: 9.6 MG/DL (ref 8.6–10)
CHLORIDE SERPL-SCNC: 102 MMOL/L (ref 98–107)
COLOR UR AUTO: COLORLESS
CREAT SERPL-MCNC: 0.65 MG/DL (ref 0.51–0.95)
DEPRECATED HCO3 PLAS-SCNC: 23 MMOL/L (ref 22–29)
EOSINOPHIL # BLD AUTO: 0 10E3/UL (ref 0–0.7)
EOSINOPHIL NFR BLD AUTO: 0 %
ERYTHROCYTE [DISTWIDTH] IN BLOOD BY AUTOMATED COUNT: 11.6 % (ref 10–15)
GFR SERPL CREATININE-BSD FRML MDRD: >90 ML/MIN/1.73M2
GLUCOSE SERPL-MCNC: 87 MG/DL (ref 70–99)
GLUCOSE UR STRIP-MCNC: NEGATIVE MG/DL
HCG SERPL QL: NEGATIVE
HCT VFR BLD AUTO: 38.8 % (ref 35–47)
HGB BLD-MCNC: 13.8 G/DL (ref 11.7–15.7)
HGB UR QL STRIP: NEGATIVE
IMM GRANULOCYTES # BLD: 0 10E3/UL
IMM GRANULOCYTES NFR BLD: 0 %
KETONES UR STRIP-MCNC: NEGATIVE MG/DL
LEUKOCYTE ESTERASE UR QL STRIP: NEGATIVE
LYMPHOCYTES # BLD AUTO: 1.9 10E3/UL (ref 0.8–5.3)
LYMPHOCYTES NFR BLD AUTO: 21 %
MAGNESIUM SERPL-MCNC: 1.9 MG/DL (ref 1.7–2.3)
MCH RBC QN AUTO: 32.5 PG (ref 26.5–33)
MCHC RBC AUTO-ENTMCNC: 35.6 G/DL (ref 31.5–36.5)
MCV RBC AUTO: 91 FL (ref 78–100)
MONOCYTES # BLD AUTO: 0.6 10E3/UL (ref 0–1.3)
MONOCYTES NFR BLD AUTO: 7 %
NEUTROPHILS # BLD AUTO: 6.6 10E3/UL (ref 1.6–8.3)
NEUTROPHILS NFR BLD AUTO: 72 %
NITRATE UR QL: NEGATIVE
NRBC # BLD AUTO: 0 10E3/UL
NRBC BLD AUTO-RTO: 0 /100
PH UR STRIP: 6 [PH] (ref 5–7)
PLATELET # BLD AUTO: 233 10E3/UL (ref 150–450)
POTASSIUM SERPL-SCNC: 3.8 MMOL/L (ref 3.4–5.3)
RBC # BLD AUTO: 4.25 10E6/UL (ref 3.8–5.2)
RBC URINE: 1 /HPF
SODIUM SERPL-SCNC: 136 MMOL/L (ref 136–145)
SP GR UR STRIP: 1 (ref 1–1.03)
SQUAMOUS EPITHELIAL: 1 /HPF
UROBILINOGEN UR STRIP-MCNC: <2 MG/DL
WBC # BLD AUTO: 9.2 10E3/UL (ref 4–11)
WBC URINE: <1 /HPF

## 2023-04-15 PROCEDURE — 99284 EMERGENCY DEPT VISIT MOD MDM: CPT | Mod: 25

## 2023-04-15 PROCEDURE — 85025 COMPLETE CBC W/AUTO DIFF WBC: CPT | Performed by: EMERGENCY MEDICINE

## 2023-04-15 PROCEDURE — 81003 URINALYSIS AUTO W/O SCOPE: CPT | Performed by: EMERGENCY MEDICINE

## 2023-04-15 PROCEDURE — 96374 THER/PROPH/DIAG INJ IV PUSH: CPT

## 2023-04-15 PROCEDURE — 84703 CHORIONIC GONADOTROPIN ASSAY: CPT | Performed by: EMERGENCY MEDICINE

## 2023-04-15 PROCEDURE — 83735 ASSAY OF MAGNESIUM: CPT | Performed by: EMERGENCY MEDICINE

## 2023-04-15 PROCEDURE — 36415 COLL VENOUS BLD VENIPUNCTURE: CPT | Performed by: EMERGENCY MEDICINE

## 2023-04-15 PROCEDURE — 93005 ELECTROCARDIOGRAM TRACING: CPT | Performed by: EMERGENCY MEDICINE

## 2023-04-15 PROCEDURE — 96375 TX/PRO/DX INJ NEW DRUG ADDON: CPT

## 2023-04-15 PROCEDURE — 80048 BASIC METABOLIC PNL TOTAL CA: CPT | Performed by: EMERGENCY MEDICINE

## 2023-04-15 PROCEDURE — 258N000003 HC RX IP 258 OP 636: Performed by: EMERGENCY MEDICINE

## 2023-04-15 PROCEDURE — 250N000011 HC RX IP 250 OP 636: Performed by: EMERGENCY MEDICINE

## 2023-04-15 RX ORDER — KETOROLAC TROMETHAMINE 15 MG/ML
15 INJECTION, SOLUTION INTRAMUSCULAR; INTRAVENOUS ONCE
Status: COMPLETED | OUTPATIENT
Start: 2023-04-15 | End: 2023-04-15

## 2023-04-15 RX ORDER — METOCLOPRAMIDE HYDROCHLORIDE 5 MG/ML
10 INJECTION INTRAMUSCULAR; INTRAVENOUS ONCE
Status: COMPLETED | OUTPATIENT
Start: 2023-04-15 | End: 2023-04-15

## 2023-04-15 RX ORDER — DIPHENHYDRAMINE HYDROCHLORIDE 50 MG/ML
25 INJECTION INTRAMUSCULAR; INTRAVENOUS ONCE
Status: COMPLETED | OUTPATIENT
Start: 2023-04-15 | End: 2023-04-15

## 2023-04-15 RX ADMIN — DIPHENHYDRAMINE HYDROCHLORIDE 25 MG: 50 INJECTION, SOLUTION INTRAMUSCULAR; INTRAVENOUS at 19:27

## 2023-04-15 RX ADMIN — KETOROLAC TROMETHAMINE 15 MG: 15 INJECTION, SOLUTION INTRAMUSCULAR; INTRAVENOUS at 19:26

## 2023-04-15 RX ADMIN — METOCLOPRAMIDE 10 MG: 5 INJECTION, SOLUTION INTRAMUSCULAR; INTRAVENOUS at 19:29

## 2023-04-15 RX ADMIN — SODIUM CHLORIDE 1000 ML: 9 INJECTION, SOLUTION INTRAVENOUS at 19:25

## 2023-04-15 ASSESSMENT — ACTIVITIES OF DAILY LIVING (ADL): ADLS_ACUITY_SCORE: 33

## 2023-04-15 NOTE — TELEPHONE ENCOUNTER
Pt is calling in about a visit at an [facility] clinic she had today. Pt has an [facility] PCP. Pt is calling in about medications she was given at the Urgent Care there today. Pt was given the [facility] Nurse Line number, in case they need to call her PCP. Pt verbalized understanding.    Marco Antonio Hu RN on 4/15/2023 at 5:45 PM       Reason for Disposition    General information question, no triage required and triager able to answer question    Additional Information    Negative: [1] Caller is not with the adult (patient) AND [2] reporting urgent symptoms    Negative: Lab result questions    Negative: Medication questions    Negative: Caller can't be reached by phone    Negative: Caller has already spoken to PCP or another triager    Negative: RN needs further essential information from caller in order to complete triage    Negative: Requesting regular office appointment    Negative: [1] Caller requesting NON-URGENT health information AND [2] PCP's office is the best resource    Negative: Health Information question, no triage required and triager able to answer question    Protocols used: INFORMATION ONLY CALL - NO TRIAGE-A-

## 2023-04-15 NOTE — ED PROVIDER NOTES
EMERGENCY DEPARTMENT ENCOUNTER      NAME: Renata Staton  AGE: 27 year old female  YOB: 1995  MRN: [id number]  EVALUATION DATE & TIME: 4/15/2023  6:19 PM    PCP: Andria Hernández    ED PROVIDER: Sherron Galdamez M.D.      Chief Complaint   Patient presents with     Headache     Panic Attack     FINAL IMPRESSION:  1. Other migraine with status migrainosus, not intractable      ED COURSE & MEDICAL DECISION MAKIN:29 PM I met with the patient to obtain patient history and performed a physical exam. Discussed plan for ED work up including potential diagnostic studies and interventions.  7:45 PM Reevaluated and updated the patient. We discussed the plan for discharge and the patient is agreeable. Reviewed supportive cares, symptomatic treatment, outpatient follow up, and reasons to return to the Emergency Department. Patient to be discharged by ED RN.      Pertinent Labs & Imaging studies reviewed. (See chart for details)  ED Course as of 04/15/23 2055   Sat Apr 15, 2023   1835 Patient is a 27-year-old female who comes in today for evaluation of a headache and feeling like she had a panic attack earlier today.  Around 4 PM she developed a headache and took some Toradol.  She then felt like she developed a panic attack and was anxious.  She complained of some tingling to her left hand that has since resolved.  She also had tingling in her mouth that has also since resolved.  She has had anxiety before.  She has had migraines before.  She still has a headache now.  She also complains of chills.  She is in a room by a window that is cooled so that might be part of what she is feeling so I did give her a couple of blankets.  We will check some basic labs and a urine on her.  We will check a pregnancy test as she did not know if she could be pregnant.  We will treat her symptomatically like a migraine with some Toradol, Reglan, and Benadryl and see how she feels after that.  I discussed this with her  and she is in agreement with the plan.   1903 I reviewed multiple outpatient visit notes.  Patient has had sensitivity to medications in the past.  I reviewed the patient's cardiology note from 4/5/2022.  She had complained of some lightheadedness and thought was to be due to hypotension or hypovolemia.  She also had atypical chest pain.  They had recommended that she follow-up with her primary doctor.   1944 Patient is requesting discharge home.  All her labs have come back okay except for her pregnancy test.  I do not think that would .  We will work in getting her discharged home and I can call her if there is anything she needs to know.   1953 I discussed results and plan for discharge with the patient.  She is in agreement.       Medical Decision Making    History:    Supplemental history from: None    External Record(s) reviewed: See details above    Work Up:    Emergent/Severe conditions considered and evaluated for: Electrolyte abnormality, severe dehydration, urinary tract infection, anemia, renal failure    I independently reviewed and interpreted EKG    In additional to work up documented, I considered the following work up: None    Medications given that require intensive monitoring for toxicity: None    External consultation:    Discussion of management with another provider: None    Complicating factors:    Care impacted by chronic illness: Mental health, Chronic pain     Care affected by social determinants of health: Smoker    Disposition considerations: Discharge  Prescriptions considered/prescribed: None    At the conclusion of the encounter I discussed  the results of all of the tests and the disposition with patient.   All questions were answered.  The patient acknowledged understanding and was involved in the decision making regarding the overall care plan.      I discussed with patient the utility, limitations and findings of the exam/interventions/studies done during this  visit as well as the list of differential diagnosis and symptoms to monitor/return to ER for.  Additional verbal discharge instructions were provided.     MEDICATIONS GIVEN IN THE EMERGENCY:  Medications   ketorolac (TORADOL) injection 15 mg (15 mg Intravenous $Given 4/15/23 1926)   metoclopramide (REGLAN) injection 10 mg (10 mg Intravenous $Given 4/15/23 1929)   diphenhydrAMINE (BENADRYL) injection 25 mg (25 mg Intravenous $Given 4/15/23 1927)   0.9% sodium chloride BOLUS (0 mLs Intravenous Stopped 4/15/23 1952)       NEW PRESCRIPTIONS STARTED AT TODAY'S ER VISIT  Discharge Medication List as of 4/15/2023  7:52 PM             =================================================================    HPI    Triage Note:   Pt presents with headache and poss panic attack. Pt states she took her last Diflucan pill and then felt odd, and started to get a headache. Pt then took toradol and started to fell anxious. Left hand started to go numb and roof of her mouth. Pt states she has anxiety and has taken Hydroxyzine and Benadryl for it but neither helped.     Triage Assessment     Row Name 04/15/23 1811       Triage Assessment (Adult)    Airway WDL WDL       Respiratory WDL    Respiratory WDL WDL       Skin Circulation/Temperature WDL    Skin Circulation/Temperature WDL WDL       Cardiac WDL    Cardiac WDL WDL       Peripheral/Neurovascular WDL    Peripheral Neurovascular WDL WDL    Capillary Refill, General less than/equal to 3 secs       Cognitive/Neuro/Behavioral WDL    Cognitive/Neuro/Behavioral WDL mood/behavior    Mood/Behavior anxious       Tommy Coma Scale    Best Eye Response 4-->(E4) spontaneous    Best Motor Response 6-->(M6) obeys commands    Best Verbal Response 5-->(V5) oriented    Tommy Coma Scale Score 15                  Patient information was obtained from: patient    Use of : N/A         Renata Staton is a 27 year old female who presents headache and panic attack.    Patient reports she  "took her last Diflucan pill at 12 PM while she was at work and at 4 PM, she suddenly got a migraine. She took 1 tablet of 10 mg Toradol and about 10 minutes later, she started to \"feel high.\" She said that she doesn't like to \"feel high\" and started to have an anxiety attack because she needed to drive herself home. After the anxiety attack, she started to develop a \"massive\" headache located behind her eyes and the top of her head, left hand numbness, and tongue numbness (both of which has resolved). She also associates chills.    She denies associating tingling and numbness in bilateral lower extremities, decrease in appetite and liquid intake, shortness of breath, cough, and chest pain. She says she is unsure if she's pregnant. She has history of neuropathy, anxiety, depression, chronic daily migraines, cholelithiasis, and GERD. Of note, she is currently dealing with a yeast infection and bacterial vaginosis and is currently taking diflucan for them.    There were no other concerns/complaints at this time.    SHx: She is a smoker.    PAST MEDICAL HISTORY:  Past Medical History:   Diagnosis Date     Anxiety      Chronic abdominal pain      Chronic pelvic pain in female      Depression      Depressive disorder 2017     History of anesthesia complications     family issues slow to wake up after surgery     Migraine        PAST SURGICAL HISTORY:  Past Surgical History:   Procedure Laterality Date     ABDOMEN SURGERY       COLONOSCOPY  10/28     ENDOSCOPIC RETROGRADE CHOLANGIOPANCREATOGRAM N/A 12/18/2019    Procedure: ENDOSCOPIC RETROGRADE CHOLANGIOPANCREATOGRAPHY with billary sphinctomy and stone extraction and  billary stent placement with epinephrine injection ;  Surgeon: Dustin Betancourt MD;  Location: [facility] OR;  Service: Gastroenterology     ENDOSCOPIC RETROGRADE CHOLANGIOPANCREATOGRAM N/A 02/28/2020    Procedure: ENDOSCOPIC RETROGRADE CHOLANGIOPANCREATOGRAPHY, STENT REMOVAL, SLUDGE REMOVAL;  Surgeon: " Dustin Betancourt MD;  Location: [facility];  Service: Gastroenterology     LAPAROSCOPIC CHOLECYSTECTOMY N/A 12/11/2019    Procedure: CHOLECYSTECTOMY, LAPAROSCOPIC;  Surgeon: Samia Portillo MD;  Location: [facility];  Service: General     WA LAP,DIAGNOSTIC ABDOMEN N/A 02/20/2018    Procedure: DIAGNOSTIC LAPAROSCOPY, LYSIS OF  ADHESIONS;  Surgeon: Manish Lyons MD;  Location: [facility] OR;  Service: Gynecology     XR ERCP BILIARY ONLY  12/18/2019     XR ERCP BILIARY ONLY  02/28/2020       CURRENT MEDICATIONS:    No current facility-administered medications for this encounter.    Current Outpatient Medications:      cholecalciferol 50 MCG (2000 UT) CAPS, Take 2,000 Units by mouth daily, Disp: , Rfl:      cyanocobalamin (VITAMIN B-12) 1000 MCG tablet, Take 1,000 mcg by mouth daily, Disp: , Rfl:      dimenhyDRINATE (DRAMAMINE) 50 MG CHEW, Take 1 tablet (50 mg) by mouth every 6 hours as needed for other (nausea), Disp: 10 tablet, Rfl: 0     hydrOXYzine (VISTARIL) 25 MG capsule, Take 1 capsule (25 mg) by mouth 3 times daily as needed for anxiety, Disp: 20 capsule, Rfl: 0     ondansetron (ZOFRAN-ODT) 8 MG ODT tab, Take 1 tablet (8 mg) by mouth every 8 hours as needed, Disp: 12 tablet, Rfl: 0     pseudoePHEDrine (SUDAFED) 30 MG tablet, Take 1 tablet (30 mg) by mouth every 4 hours as needed for congestion (Patient not taking: Reported on 2/8/2023), Disp: 20 tablet, Rfl: 0     sodium chloride (OCEAN) 0.65 % nasal spray, As needed for nasal congestion. (Patient not taking: Reported on 2/8/2023), Disp: 30 mL, Rfl: 0    ALLERGIES:  Allergies   Allergen Reactions     Penicillin G Anaphylaxis     Sumatriptan Hives, Swelling and Anxiety     Went to ED after taking 25mg for first time, no objective findings, likely not true allergy     Augmentin Hives     Iohexol Hives and Itching     Pt had hives after premedication.  She had a prior reaction in 2020 at the ER given Iohexol and today was given  "Iohexol again with premedication of Prednistone 50 mg 13, 7, 1 hour prior.  Pt developed hives on head and abdomen.  Stable vitals HR 95, BP 90/57, O2 SAT 99%.  Pt given water and time and symptoms improved.  No breathing or respiratory issues.   Pt check by RN from primary care clinic.  Benedict     Lansoprazole Hives     Peanut Oil Unknown     Peanut [Peanut Oil] Swelling     Throat swelling       FAMILY HISTORY:  Family History   Problem Relation Age of Onset     Cancer Mother      Asthma Mother      Asthma Sister      Mental Illness Sister      Heart Disease Sister        SOCIAL HISTORY:   Social History     Socioeconomic History     Marital status: Single   Tobacco Use     Smoking status: Every Day     Packs/day: 0.50     Years: 7.00     Pack years: 3.50     Types: Cigarettes     Smokeless tobacco: Never   Substance and Sexual Activity     Alcohol use: No     Comment: Alcoholic Drinks/day: rare     Drug use: Never     Sexual activity: Yes     Partners: Male     Birth control/protection: Condom   Other Topics Concern     Parent/sibling w/ CABG, MI or angioplasty before 65F 55M? No       PHYSICAL EXAM    VITAL SIGNS: /72   Pulse 91   Temp 99.8  F (37.7  C) (Temporal)   Resp 16   Ht 1.651 m (5' 5\")   Wt 54.9 kg (121 lb)   LMP 04/02/2023   SpO2 100%   BMI 20.14 kg/m     GENERAL: Awake, alert, answering questions appropriately, mildly uncomfortable.  SPEECH:  Easy to understand speech, Normal volume and colby  PULMONARY: No respiratory distress, Lungs clear to auscultation bilaterally  CARDIOVASCULAR: Regular rate and rhythm, Distal pulses present and normal.  ABDOMINAL: Soft, Nondistended, Nontender, No rebound or guarding, No palpable masses  EXTREMITIES: No lower extremity edema. Normal sensation and strength to hands bilaterally.  Normal sensation to bilateral lower extremities.  Equal sensation bilaterally.  No focal neurologic deficits  PSYCH: Normal mood and affect     LAB:  All pertinent labs " reviewed and interpreted.  Results for orders placed or performed during the hospital encounter of 04/15/23   UA with Microscopic reflex to Culture    Specimen: Urine, Midstream   Result Value Ref Range    Color Urine Colorless Colorless, Straw, Light Yellow, Yellow    Appearance Urine Clear Clear    Glucose Urine Negative Negative mg/dL    Bilirubin Urine Negative Negative    Ketones Urine Negative Negative mg/dL    Specific Gravity Urine 1.003 1.001 - 1.030    Blood Urine Negative Negative    pH Urine 6.0 5.0 - 7.0    Protein Albumin Urine Negative Negative mg/dL    Urobilinogen Urine <2.0 <2.0 mg/dL    Nitrite Urine Negative Negative    Leukocyte Esterase Urine Negative Negative    Bacteria Urine Few (A) None Seen /HPF    RBC Urine 1 <=2 /HPF    WBC Urine <1 <=5 /HPF    Squamous Epithelials Urine 1 <=1 /HPF   Basic metabolic panel   Result Value Ref Range    Sodium 136 136 - 145 mmol/L    Potassium 3.8 3.4 - 5.3 mmol/L    Chloride 102 98 - 107 mmol/L    Carbon Dioxide (CO2) 23 22 - 29 mmol/L    Anion Gap 11 7 - 15 mmol/L    Urea Nitrogen 10.2 6.0 - 20.0 mg/dL    Creatinine 0.65 0.51 - 0.95 mg/dL    Calcium 9.6 8.6 - 10.0 mg/dL    Glucose 87 70 - 99 mg/dL    GFR Estimate >90 >60 mL/min/1.73m2   Result Value Ref Range    Magnesium 1.9 1.7 - 2.3 mg/dL   HCG qualitative Blood   Result Value Ref Range    hCG Serum Qualitative Negative Negative   CBC with platelets and differential   Result Value Ref Range    WBC Count 9.2 4.0 - 11.0 10e3/uL    RBC Count 4.25 3.80 - 5.20 10e6/uL    Hemoglobin 13.8 11.7 - 15.7 g/dL    Hematocrit 38.8 35.0 - 47.0 %    MCV 91 78 - 100 fL    MCH 32.5 26.5 - 33.0 pg    MCHC 35.6 31.5 - 36.5 g/dL    RDW 11.6 10.0 - 15.0 %    Platelet Count 233 150 - 450 10e3/uL    % Neutrophils 72 %    % Lymphocytes 21 %    % Monocytes 7 %    % Eosinophils 0 %    % Basophils 0 %    % Immature Granulocytes 0 %    NRBCs per 100 WBC 0 <1 /100    Absolute Neutrophils 6.6 1.6 - 8.3 10e3/uL    Absolute Lymphocytes  1.9 0.8 - 5.3 10e3/uL    Absolute Monocytes 0.6 0.0 - 1.3 10e3/uL    Absolute Eosinophils 0.0 0.0 - 0.7 10e3/uL    Absolute Basophils 0.0 0.0 - 0.2 10e3/uL    Absolute Immature Granulocytes 0.0 <=0.4 10e3/uL    Absolute NRBCs 0.0 10e3/uL       EKG:    Date and time: April 15, 2023 at 1931  Rate: 63 bpm  Rhythm: Sinus rhythm  KS interval: 118 ms  QRS interval: 68 ms  QT/QTc: 380/388 ms  ST changes or T wave changes: No acute ST or T wave  Change from prior ECG: No significant change from prior  I have independently reviewed and interpreted this EKG.       I, Payton Arguelles, am serving as a scribe to document services personally performed by Dr. Galdamez based on my observation and the provider's statements to me. I, Sherron Galdamez MD attest that Payton Arguelles is acting in a scribe capacity, has observed my performance of the services and has documented them in accordance with my direction.    Sherron Galdamez M.D.  Emergency Medicine  [facility] EMERGENCY DEPARTMENT  [facility] [id number] [phone]  Dept: [phone]     Sherron Galdamez MD  04/15/23 2056

## 2023-04-15 NOTE — ED TRIAGE NOTES
Pt presents with headache and poss panic attack. Pt states she took her last Diflucan pill and then felt odd, and started to get a headache. Pt then took toradol and started to fell anxious. Left hand started to go numb and roof of her mouth. Pt states she has anxiety and has taken Hydroxyzine and Benadryl for it but neither helped.     Triage Assessment     Row Name 04/15/23 4125       Triage Assessment (Adult)    Airway WDL WDL       Respiratory WDL    Respiratory WDL WDL       Skin Circulation/Temperature WDL    Skin Circulation/Temperature WDL WDL       Cardiac WDL    Cardiac WDL WDL       Peripheral/Neurovascular WDL    Peripheral Neurovascular WDL WDL    Capillary Refill, General less than/equal to 3 secs       Cognitive/Neuro/Behavioral WDL    Cognitive/Neuro/Behavioral WDL mood/behavior    Mood/Behavior anxious       Tommy Coma Scale    Best Eye Response 4-->(E4) spontaneous    Best Motor Response 6-->(M6) obeys commands    Best Verbal Response 5-->(V5) oriented    Tommy Coma Scale Score 15

## 2023-04-16 LAB
ATRIAL RATE - MUSE: 63 BPM
DIASTOLIC BLOOD PRESSURE - MUSE: NORMAL MMHG
INTERPRETATION ECG - MUSE: NORMAL
P AXIS - MUSE: 61 DEGREES
PR INTERVAL - MUSE: 118 MS
QRS DURATION - MUSE: 68 MS
QT - MUSE: 380 MS
QTC - MUSE: 388 MS
R AXIS - MUSE: 73 DEGREES
SYSTOLIC BLOOD PRESSURE - MUSE: NORMAL MMHG
T AXIS - MUSE: 53 DEGREES
VENTRICULAR RATE- MUSE: 63 BPM

## 2023-04-16 NOTE — DISCHARGE INSTRUCTIONS
You were seen in the Emergency Department today for evaluation of a headache and some tingling.  Your lab work showed no cause of your symptoms but some of your labs are still pending.  I will call you if there is anything concerning that you need to know about.  Follow up with your primary care physician to ensure resolution of symptoms. Return if you have new or worsening symptoms.

## 2023-04-18 ENCOUNTER — VIRTUAL VISIT (OUTPATIENT)
Dept: FAMILY MEDICINE | Facility: CLINIC | Age: 28
End: 2023-04-18
Payer: COMMERCIAL

## 2023-04-18 DIAGNOSIS — N92.0 MENORRHAGIA WITH REGULAR CYCLE: ICD-10-CM

## 2023-04-18 DIAGNOSIS — G43.709 CHRONIC MIGRAINE WITHOUT AURA WITHOUT STATUS MIGRAINOSUS, NOT INTRACTABLE: Primary | ICD-10-CM

## 2023-04-18 PROCEDURE — 99214 OFFICE O/P EST MOD 30 MIN: CPT | Mod: VID | Performed by: NURSE PRACTITIONER

## 2023-04-18 RX ORDER — LEVONORGESTREL AND ETHINYL ESTRADIOL 0.15-0.03
1 KIT ORAL DAILY
Qty: 84 TABLET | Refills: 1 | Status: SHIPPED | OUTPATIENT
Start: 2023-04-18 | End: 2023-10-19

## 2023-04-18 RX ORDER — DEXAMETHASONE 4 MG/1
4 TABLET ORAL ONCE
Qty: 1 TABLET | Refills: 0 | Status: SHIPPED | OUTPATIENT
Start: 2023-04-18 | End: 2024-07-03

## 2023-04-18 RX ORDER — ONDANSETRON 8 MG/1
8 TABLET, ORALLY DISINTEGRATING ORAL EVERY 8 HOURS PRN
Qty: 30 TABLET | Refills: 1 | Status: SHIPPED | OUTPATIENT
Start: 2023-04-18 | End: 2023-04-19

## 2023-04-18 ASSESSMENT — ENCOUNTER SYMPTOMS: HEADACHES: 1

## 2023-04-18 NOTE — PROGRESS NOTES
Gloria is a 27 year old who is being evaluated via a billable video visit.      How would you like to obtain your AVS? MyChart  If the video visit is dropped, the invitation should be resent by: Text to cell phone: [phone]  Will anyone else be joining your video visit? No    If video does not work call : [phone]    Assessment & Plan     Chronic migraine without aura without status migrainosus, not intractable  Was in the ER few days ago, it was somewhat helpful, but still on-going headache. Mostly behind left eye. Has eye doctor appointment coming up. We will trial zofran for nausea, decadron with her toradol she already has to try and stop the headache. Follow up with Neurology  - ondansetron (ZOFRAN ODT) 8 MG ODT tab; Take 1 tablet (8 mg) by mouth every 8 hours as needed for nausea  - dexamethasone (DECADRON) 4 MG tablet; Take 1 tablet (4 mg) by mouth once for 1 dose  - Adult Neurology  Referral; Future    Menorrhagia with regular cycle  Hx of iron def, gets infusions. Trial this for birth control/control cycle  - levonorgestrel-ethinyl estradiol (SEASONALE) 0.15-0.03 MG tablet; Take 1 tablet by mouth daily    HELDER Veloz, NP-C  [facility]   Gloria is a 27 year old, presenting for the following health issues:  Headache        4/18/2023    11:20 AM   Additional Questions   Roomed by Cinthya     Headache     History of Present Illness       Reason for visit:  Headaches, brain fog    She eats 0-1 servings of fruits and vegetables daily.She consumes 1 sweetened beverage(s) daily.She exercises with enough effort to increase her heart rate 9 or less minutes per day.  She exercises with enough effort to increase her heart rate 3 or less days per week. She is missing 1 dose(s) of medications per week.  She is not taking prescribed medications regularly due to remembering to take.       Concern - Headaches above left eye   Onset: started last week   Description: has  prescription, aching pain, gets sharp pain every once in awhile   Intensity: mild, moderate  Progression of Symptoms:  same  Accompanying Signs & Symptoms: Eye pain   Previous history of similar problem: yes, used to get when teenager   Precipitating factors:        Worsened by: Sunlight   Alleviating factors:        Improved by: Tylenol   Therapies tried and outcome: Tylenol       Feels like throwing up.  Was in the ER few days ago, she felt better but the benadryl also made her tired for the past few days. Took toradol wed/thursday. Then Saturday took it and it made her feel high.  She has a 10 mg tab at home as a script. Hasn't take it since.  Saw neurology a long time ago. +FH of migraines.     Ibuprofen and tylenol on/off    The left eye where her headache isn't sure related to needing new script for glasses. Feels when sun goes into her eyes it triggers migraines.  Right now     No sinus pains. Feels she is drinking enough water/fluids. Has clear urine.  Change in seasons also can trigger migraines.     Sleep is okay. Bowels : having some diarrhea. Kind of feels like she is run down, stomach hurts, she may feel like she is getting sick. Her sister is sick currently going to the bathroom often. She was with her sisters baby yesterday also.     Getting migraines every few months.     But the headache above her left eye is few times a week for the past few months.  Last vision check was a year ago, has an appointment Thursday morning.     Has iron deficiency, normally gets infusions. Gets done through [facility], last infusion was March 12-16th.  They also monitor her for that. She also gets panic attacks sometimes as side effect from infusions, she doesn't get very hungry and has to force herself to eat.     Migraines are worse around menses. Tried stopping it, when on depo, caused corrosion of her teeth and had to have her teeth removed.           Review of Systems   Neurological: Positive for headaches.       Constitutional, HEENT, cardiovascular, pulmonary, gi and gu systems are negative, except as otherwise noted.      Objective    Vitals - Patient Reported  Pain Score: No Pain (0)        Physical Exam   GENERAL: Healthy, alert and no distress  EYES: Eyes grossly normal to inspection.  No discharge or erythema, or obvious scleral/conjunctival abnormalities.  RESP: No audible wheeze, cough, or visible cyanosis.  No visible retractions or increased work of breathing.    SKIN: Visible skin clear. No significant rash, abnormal pigmentation or lesions.  NEURO: Cranial nerves grossly intact.  Mentation and speech appropriate for age.  PSYCH: Mentation appears normal, affect normal/bright, judgement and insight intact, normal speech and appearance well-groomed.    Admission on 04/15/2023, Discharged on 04/15/2023   Component Date Value Ref Range Status     Color Urine 04/15/2023 Colorless  Colorless, Straw, Light Yellow, Yellow Final     Appearance Urine 04/15/2023 Clear  Clear Final     Glucose Urine 04/15/2023 Negative  Negative mg/dL Final     Bilirubin Urine 04/15/2023 Negative  Negative Final     Ketones Urine 04/15/2023 Negative  Negative mg/dL Final     Specific Gravity Urine 04/15/2023 1.003  1.001 - 1.030 Final     Blood Urine 04/15/2023 Negative  Negative Final     pH Urine 04/15/2023 6.0  5.0 - 7.0 Final     Protein Albumin Urine 04/15/2023 Negative  Negative mg/dL Final     Urobilinogen Urine 04/15/2023 <2.0  <2.0 mg/dL Final     Nitrite Urine 04/15/2023 Negative  Negative Final     Leukocyte Esterase Urine 04/15/2023 Negative  Negative Final     Bacteria Urine 04/15/2023 Few (A)  None Seen /HPF Final     RBC Urine 04/15/2023 1  <=2 /HPF Final     WBC Urine 04/15/2023 <1  <=5 /HPF Final     Squamous Epithelials Urine 04/15/2023 1  <=1 /HPF Final     Sodium 04/15/2023 136  136 - 145 mmol/L Final     Potassium 04/15/2023 3.8  3.4 - 5.3 mmol/L Final     Chloride 04/15/2023 102  98 - 107 mmol/L Final     Carbon  Dioxide (CO2) 04/15/2023 23  22 - 29 mmol/L Final     Anion Gap 04/15/2023 11  7 - 15 mmol/L Final     Urea Nitrogen 04/15/2023 10.2  6.0 - 20.0 mg/dL Final     Creatinine 04/15/2023 0.65  0.51 - 0.95 mg/dL Final     Calcium 04/15/2023 9.6  8.6 - 10.0 mg/dL Final     Glucose 04/15/2023 87  70 - 99 mg/dL Final     GFR Estimate 04/15/2023 >90  >60 mL/min/1.73m2 Final    eGFR calculated using 2021 CKD-EPI equation.     Magnesium 04/15/2023 1.9  1.7 - 2.3 mg/dL Final     hCG Serum Qualitative 04/15/2023 Negative  Negative Final     Ventricular Rate 04/15/2023 63  BPM Final     Atrial Rate 04/15/2023 63  BPM Final     GA Interval 04/15/2023 118  ms Final     QRS Duration 04/15/2023 68  ms Final     QT 04/15/2023 380  ms Final     QTc 04/15/2023 388  ms Final     P Axis 04/15/2023 61  degrees Final     R AXIS 04/15/2023 73  degrees Final     T Axis 04/15/2023 53  degrees Final     Interpretation ECG 04/15/2023    Final                    Value:Sinus rhythm  Septal infarct , age undetermined  Abnormal ECG    Confirmed by SEE ED PROVIDER NOTE FOR, ECG INTERPRETATION ([id number]),  FLY GARCIA ([id number]) on 4/16/2023 3:30:20 AM       WBC Count 04/15/2023 9.2  4.0 - 11.0 10e3/uL Final     RBC Count 04/15/2023 4.25  3.80 - 5.20 10e6/uL Final     Hemoglobin 04/15/2023 13.8  11.7 - 15.7 g/dL Final     Hematocrit 04/15/2023 38.8  35.0 - 47.0 % Final     MCV 04/15/2023 91  78 - 100 fL Final     MCH 04/15/2023 32.5  26.5 - 33.0 pg Final     MCHC 04/15/2023 35.6  31.5 - 36.5 g/dL Final     RDW 04/15/2023 11.6  10.0 - 15.0 % Final     Platelet Count 04/15/2023 233  150 - 450 10e3/uL Final     % Neutrophils 04/15/2023 72  % Final     % Lymphocytes 04/15/2023 21  % Final     % Monocytes 04/15/2023 7  % Final     % Eosinophils 04/15/2023 0  % Final     % Basophils 04/15/2023 0  % Final     % Immature Granulocytes 04/15/2023 0  % Final     NRBCs per 100 WBC 04/15/2023 0  <1 /100 Final     Absolute Neutrophils 04/15/2023 6.6  1.6 - 8.3  10e3/uL Final     Absolute Lymphocytes 04/15/2023 1.9  0.8 - 5.3 10e3/uL Final     Absolute Monocytes 04/15/2023 0.6  0.0 - 1.3 10e3/uL Final     Absolute Eosinophils 04/15/2023 0.0  0.0 - 0.7 10e3/uL Final     Absolute Basophils 04/15/2023 0.0  0.0 - 0.2 10e3/uL Final     Absolute Immature Granulocytes 04/15/2023 0.0  <=0.4 10e3/uL Final     Absolute NRBCs 04/15/2023 0.0  10e3/uL Final               Video-Visit Details    Type of service:  Video Visit   Video Start Time 4:17pm  Video End Time:4:35 PM    Originating Location (pt. Location): Home  Distant Location (provider location):  Off-site  Platform used for Video Visit: Joselyn

## 2023-04-19 ENCOUNTER — HOSPITAL ENCOUNTER (EMERGENCY)
Facility: HOSPITAL | Age: 28
Discharge: HOME | End: 2023-04-19
Attending: EMERGENCY MEDICINE | Admitting: EMERGENCY MEDICINE
Payer: COMMERCIAL

## 2023-04-19 ENCOUNTER — APPOINTMENT (OUTPATIENT)
Dept: CT IMAGING | Facility: HOSPITAL | Age: 28
End: 2023-04-19
Attending: EMERGENCY MEDICINE
Payer: COMMERCIAL

## 2023-04-19 ENCOUNTER — APPOINTMENT (OUTPATIENT)
Dept: RADIOLOGY | Facility: HOSPITAL | Age: 28
End: 2023-04-19
Attending: EMERGENCY MEDICINE
Payer: COMMERCIAL

## 2023-04-19 VITALS
WEIGHT: 119 LBS | TEMPERATURE: 98.5 F | DIASTOLIC BLOOD PRESSURE: 66 MMHG | RESPIRATION RATE: 18 BRPM | HEART RATE: 77 BPM | SYSTOLIC BLOOD PRESSURE: 109 MMHG | BODY MASS INDEX: 19.8 KG/M2 | OXYGEN SATURATION: 96 %

## 2023-04-19 DIAGNOSIS — R19.7 DIARRHEA, UNSPECIFIED TYPE: ICD-10-CM

## 2023-04-19 DIAGNOSIS — R11.0 NAUSEA: ICD-10-CM

## 2023-04-19 LAB
ALBUMIN SERPL BCG-MCNC: 4.8 G/DL (ref 3.5–5.2)
ALBUMIN UR-MCNC: 10 MG/DL
ALP SERPL-CCNC: 94 U/L (ref 35–104)
ALT SERPL W P-5'-P-CCNC: 15 U/L (ref 10–35)
ANION GAP SERPL CALCULATED.3IONS-SCNC: 14 MMOL/L (ref 7–15)
APPEARANCE UR: CLEAR
AST SERPL W P-5'-P-CCNC: 17 U/L (ref 10–35)
BASOPHILS # BLD AUTO: 0 10E3/UL (ref 0–0.2)
BASOPHILS NFR BLD AUTO: 0 %
BILIRUB DIRECT SERPL-MCNC: <0.2 MG/DL (ref 0–0.3)
BILIRUB SERPL-MCNC: 0.8 MG/DL
BILIRUB UR QL STRIP: NEGATIVE
BUN SERPL-MCNC: 8.9 MG/DL (ref 6–20)
CALCIUM SERPL-MCNC: 9.6 MG/DL (ref 8.6–10)
CHLORIDE SERPL-SCNC: 102 MMOL/L (ref 98–107)
COLOR UR AUTO: YELLOW
CREAT SERPL-MCNC: 0.59 MG/DL (ref 0.51–0.95)
DEPRECATED HCO3 PLAS-SCNC: 22 MMOL/L (ref 22–29)
EOSINOPHIL # BLD AUTO: 0 10E3/UL (ref 0–0.7)
EOSINOPHIL NFR BLD AUTO: 0 %
ERYTHROCYTE [DISTWIDTH] IN BLOOD BY AUTOMATED COUNT: 11.4 % (ref 10–15)
FLUAV RNA SPEC QL NAA+PROBE: NEGATIVE
FLUBV RNA RESP QL NAA+PROBE: NEGATIVE
GFR SERPL CREATININE-BSD FRML MDRD: >90 ML/MIN/1.73M2
GLUCOSE SERPL-MCNC: 82 MG/DL (ref 70–99)
GLUCOSE UR STRIP-MCNC: NEGATIVE MG/DL
HCG UR QL: NEGATIVE
HCT VFR BLD AUTO: 43.4 % (ref 35–47)
HGB BLD-MCNC: 15.5 G/DL (ref 11.7–15.7)
HGB UR QL STRIP: NEGATIVE
HOLD SPECIMEN: NORMAL
IMM GRANULOCYTES # BLD: 0 10E3/UL
IMM GRANULOCYTES NFR BLD: 0 %
KETONES UR STRIP-MCNC: ABNORMAL MG/DL
LEUKOCYTE ESTERASE UR QL STRIP: NEGATIVE
LIPASE SERPL-CCNC: 14 U/L (ref 13–60)
LYMPHOCYTES # BLD AUTO: 1.6 10E3/UL (ref 0.8–5.3)
LYMPHOCYTES NFR BLD AUTO: 22 %
MAGNESIUM SERPL-MCNC: 1.9 MG/DL (ref 1.7–2.3)
MCH RBC QN AUTO: 32 PG (ref 26.5–33)
MCHC RBC AUTO-ENTMCNC: 35.7 G/DL (ref 31.5–36.5)
MCV RBC AUTO: 90 FL (ref 78–100)
MONOCYTES # BLD AUTO: 0.5 10E3/UL (ref 0–1.3)
MONOCYTES NFR BLD AUTO: 6 %
MUCOUS THREADS #/AREA URNS LPF: PRESENT /LPF
NEUTROPHILS # BLD AUTO: 5.3 10E3/UL (ref 1.6–8.3)
NEUTROPHILS NFR BLD AUTO: 72 %
NITRATE UR QL: NEGATIVE
NRBC # BLD AUTO: 0 10E3/UL
NRBC BLD AUTO-RTO: 0 /100
PH UR STRIP: 7 [PH] (ref 5–7)
PLATELET # BLD AUTO: 273 10E3/UL (ref 150–450)
POTASSIUM SERPL-SCNC: 3.8 MMOL/L (ref 3.4–5.3)
PROT SERPL-MCNC: 7.9 G/DL (ref 6.4–8.3)
RBC # BLD AUTO: 4.84 10E6/UL (ref 3.8–5.2)
RBC URINE: 1 /HPF
RSV RNA SPEC NAA+PROBE: NEGATIVE
SARS-COV-2 RNA RESP QL NAA+PROBE: NEGATIVE
SODIUM SERPL-SCNC: 138 MMOL/L (ref 136–145)
SP GR UR STRIP: 1.02 (ref 1–1.03)
SQUAMOUS EPITHELIAL: 4 /HPF
UROBILINOGEN UR STRIP-MCNC: <2 MG/DL
WBC # BLD AUTO: 7.4 10E3/UL (ref 4–11)
WBC URINE: 1 /HPF

## 2023-04-19 PROCEDURE — 96361 HYDRATE IV INFUSION ADD-ON: CPT

## 2023-04-19 PROCEDURE — 99285 EMERGENCY DEPT VISIT HI MDM: CPT | Mod: 25,CS

## 2023-04-19 PROCEDURE — 83735 ASSAY OF MAGNESIUM: CPT | Performed by: EMERGENCY MEDICINE

## 2023-04-19 PROCEDURE — 80053 COMPREHEN METABOLIC PANEL: CPT | Performed by: EMERGENCY MEDICINE

## 2023-04-19 PROCEDURE — 96374 THER/PROPH/DIAG INJ IV PUSH: CPT

## 2023-04-19 PROCEDURE — 83690 ASSAY OF LIPASE: CPT | Performed by: EMERGENCY MEDICINE

## 2023-04-19 PROCEDURE — 87637 SARSCOV2&INF A&B&RSV AMP PRB: CPT | Performed by: EMERGENCY MEDICINE

## 2023-04-19 PROCEDURE — 36415 COLL VENOUS BLD VENIPUNCTURE: CPT | Performed by: EMERGENCY MEDICINE

## 2023-04-19 PROCEDURE — 74176 CT ABD & PELVIS W/O CONTRAST: CPT

## 2023-04-19 PROCEDURE — 82248 BILIRUBIN DIRECT: CPT | Performed by: EMERGENCY MEDICINE

## 2023-04-19 PROCEDURE — 81001 URINALYSIS AUTO W/SCOPE: CPT | Performed by: EMERGENCY MEDICINE

## 2023-04-19 PROCEDURE — 258N000003 HC RX IP 258 OP 636: Performed by: EMERGENCY MEDICINE

## 2023-04-19 PROCEDURE — 85025 COMPLETE CBC W/AUTO DIFF WBC: CPT | Performed by: EMERGENCY MEDICINE

## 2023-04-19 PROCEDURE — 250N000011 HC RX IP 250 OP 636: Performed by: EMERGENCY MEDICINE

## 2023-04-19 PROCEDURE — 81025 URINE PREGNANCY TEST: CPT | Performed by: EMERGENCY MEDICINE

## 2023-04-19 PROCEDURE — 71046 X-RAY EXAM CHEST 2 VIEWS: CPT

## 2023-04-19 PROCEDURE — C9803 HOPD COVID-19 SPEC COLLECT: HCPCS

## 2023-04-19 RX ORDER — ONDANSETRON 4 MG/1
4 TABLET, ORALLY DISINTEGRATING ORAL EVERY 8 HOURS PRN
Qty: 10 TABLET | Refills: 0 | Status: SHIPPED | OUTPATIENT
Start: 2023-04-19 | End: 2024-07-03

## 2023-04-19 RX ORDER — ONDANSETRON 2 MG/ML
4 INJECTION INTRAMUSCULAR; INTRAVENOUS ONCE
Status: COMPLETED | OUTPATIENT
Start: 2023-04-19 | End: 2023-04-19

## 2023-04-19 RX ADMIN — SODIUM CHLORIDE 1000 ML: 9 INJECTION, SOLUTION INTRAVENOUS at 10:30

## 2023-04-19 RX ADMIN — ONDANSETRON 4 MG: 2 INJECTION INTRAMUSCULAR; INTRAVENOUS at 10:34

## 2023-04-19 ASSESSMENT — ENCOUNTER SYMPTOMS
DIARRHEA: 1
WEAKNESS: 0
VOMITING: 0
TROUBLE SWALLOWING: 0
ABDOMINAL PAIN: 1
NAUSEA: 1
CHILLS: 1
COUGH: 1
NUMBNESS: 0
DYSURIA: 0
FATIGUE: 1

## 2023-04-19 NOTE — DISCHARGE INSTRUCTIONS
All of your tests look great.  I have written nausea medication for you to use at home.    Drink liquids such as water or Gatorade type products to stay hydrated.    This may just be a stomach virus, there are many going around the community at this time.    Return to emergency department with worsening abdominal pain, fever, worsening vomiting or diarrhea, concerns for dehydration, or any other concerns.    Thank you for choosing [facility] Emergency [facility].  It has been my pleasure caring for you today.     ~Dr. Bubba MD

## 2023-04-19 NOTE — ED PROVIDER NOTES
"    EMERGENCY DEPARTMENT ENCOUNTER      NAME: Renata Staton  AGE: 27 year old female  YOB: 1995  MRN: [id number]  EVALUATION DATE & TIME: No admission date for patient encounter.    PCP: Andria Hernández    ED PROVIDER: Yudi Mac M.D.        Chief Complaint   Patient presents with     Abdominal Pain         FINAL IMPRESSION:    1. Nausea    2. Diarrhea, unspecified type            MEDICAL DECISION MAKIN year old female with history of chronic abdominal pain and tobacco abuse who presents emergency department with nausea, diarrhea, cough and abdominal pain.  Work-up here in the ER is unremarkable.  Overall she looks much better.  At this time I feel she safe for discharge home for conservative management at home with prescription for Zofran.  Work note has been provided.      ED COURSE:  10:12 AM  I met with the patient to gather history and perform my exam. ED course and treatment discussed.    12:22 PM  Patient is feeling better.  CT scan and laboratories reassuring.  May be more of a viral gastroenteritis type picture.  I do not have concerns with things like stroke, abdominal catastrophe, etc.  Patient reports feeling \"brain fog \"but this is more just to her feeling ill.  I do not think she requires any imaging of her brain such as CT scan or MRI.  No concerns for meningitis encephalitis.  She denies any headache.  Neuro exam is completely unremarkable.  Overall she looks quite well.  At this time I feel she is safe for discharge home with prescription for Zofran.  Repeat heart rate was 77 per nursing.  Patient agrees with the plan for discharge and all of her questions have been answered.    I do not think that this represents ACS, PE, ruptured AAA, aortic dissection, bowel obstruction, bowel ischemia, cholecystitis, pancreatitis, appendicitis, diverticulitis, kidney stone, pyelonephritis, incarcerated or strangulated hernia, ovarian/testicular torsion, PID, ectopic pregnancy, " tubo-ovarian abscess, viscus perforation, perforated GI ulcer, or other such etiologies at this time.      COVID-19 PPE worn during patient evaluation:  Mask: n95 and homemade masks   Eye Protection: none   Gown: none   Hair cover: yes  Face shield: none   Patient wearing a mask: yes     At the conclusion of the encounter I discussed the results of all of the tests and the disposition. Their questions were answered. The patient (and any family present) acknowledged understanding and were agreeable with the care plan.      CONSULTANTS:  none        MEDICATIONS GIVEN IN THE EMERGENCY:  Medications   0.9% sodium chloride BOLUS (0 mLs Intravenous Stopped 4/19/23 1120)   ondansetron (ZOFRAN) injection 4 mg (4 mg Intravenous $Given 4/19/23 1034)           NEW PRESCRIPTIONS STARTED AT TODAY'S ER VISIT     Medication List      Modified    ondansetron 4 MG ODT tab  Commonly known as: ZOFRAN ODT  4 mg, Oral, EVERY 8 HOURS PRN  What changed:     medication strength    how much to take    reasons to take this                CONDITION:  stable        DISPOSITION:  D.c home         =================================================================  =================================================================  TRIAGE ASSESSMENT:  Pt presents tot triage ambulatory for abdominal pain. Pt reports symptoms started yesterday with abdominal pain, diarrhea, HA, confusion, and feeling feverish. Pt reports she was dx with BV two weeks ago. Has finished two courses of antibiotics. Reports fatigue as well.      Triage Assessment     Row Name 04/19/23 0930       Triage Assessment (Adult)    Airway WDL WDL       Cardiac WDL    Cardiac WDL X;rhythm    Pulse Rate & Regularity tachycardic       Peripheral/Neurovascular WDL    Peripheral Neurovascular WDL WDL       Cognitive/Neuro/Behavioral WDL    Cognitive/Neuro/Behavioral WDL WDL                   ED Triage Vitals [04/19/23 4151]   Enc Vitals Group      BP (!) 127/90      Pulse 117       "Resp 16      Temp 98.5  F (36.9  C)      Temp src Oral      SpO2 97 %      Weight 54 kg (119 lb)          ================================================================  ================================================================    HPI    Patient information was obtained from: Patient    Use of Intrepreter: N/A     Renata Staton is a 27 year old female with history of chronic abdominal pain, tobacco use, who presents to the ER with complaints of abdominal pain, cough, nausea, and diarrhea.    The past couple of days she has not been feeling well.  She has had nausea without vomiting, diarrhea, some abdominal pain, and occasional cough.  She assumed it was just \"flu\" but feels just a little more fatigued \" head fog \".    Otherwise denies any fevers, shortness of breath, dysuria or hematuria.  Did not get her influenza shot this year but is up-to-date on COVID vaccinations.      REVIEW OF SYSTEMS  Review of Systems   Constitutional: Positive for chills and fatigue.   HENT: Negative for trouble swallowing.    Respiratory: Positive for cough.    Cardiovascular: Negative for chest pain.   Gastrointestinal: Positive for abdominal pain, diarrhea and nausea. Negative for vomiting.   Genitourinary: Negative for dysuria.   Allergic/Immunologic: Negative for immunocompromised state.   Neurological: Negative for weakness and numbness.   All other systems reviewed and are negative.        PAST MEDICAL HISTORY:  Past Medical History:   Diagnosis Date     Anxiety      Chronic abdominal pain      Chronic pelvic pain in female      Depression      Depressive disorder 2017     History of anesthesia complications     family issues slow to wake up after surgery     Migraine          PAST SURGICAL HISTORY:  Past Surgical History:   Procedure Laterality Date     ABDOMEN SURGERY       COLONOSCOPY  10/28     ENDOSCOPIC RETROGRADE CHOLANGIOPANCREATOGRAM N/A 12/18/2019    Procedure: ENDOSCOPIC RETROGRADE " CHOLANGIOPANCREATOGRAPHY with billary sphinctomy and stone extraction and  billary stent placement with epinephrine injection ;  Surgeon: Dustin Betancourt MD;  Location: [facility];  Service: Gastroenterology     ENDOSCOPIC RETROGRADE CHOLANGIOPANCREATOGRAM N/A 02/28/2020    Procedure: ENDOSCOPIC RETROGRADE CHOLANGIOPANCREATOGRAPHY, STENT REMOVAL, SLUDGE REMOVAL;  Surgeon: Dustin Betancourt MD;  Location: [facility] OR;  Service: Gastroenterology     LAPAROSCOPIC CHOLECYSTECTOMY N/A 12/11/2019    Procedure: CHOLECYSTECTOMY, LAPAROSCOPIC;  Surgeon: Samia Portillo MD;  Location: [facility] OR;  Service: General     VA LAP,DIAGNOSTIC ABDOMEN N/A 02/20/2018    Procedure: DIAGNOSTIC LAPAROSCOPY, LYSIS OF  ADHESIONS;  Surgeon: Manish Lyons MD;  Location: [facility] OR;  Service: Gynecology     XR ERCP BILIARY ONLY  12/18/2019     XR ERCP BILIARY ONLY  02/28/2020         CURRENT MEDICATIONS:    Prior to Admission medications    Medication Sig Start Date End Date Taking? Authorizing Provider   cholecalciferol 50 MCG (2000 UT) CAPS Take 2,000 Units by mouth daily 1/6/21   Reported, Patient   cyanocobalamin (VITAMIN B-12) 1000 MCG tablet Take 1,000 mcg by mouth daily 8/31/21   Reported, Patient   dexamethasone (DECADRON) 4 MG tablet Take 1 tablet (4 mg) by mouth once for 1 dose 4/18/23 4/18/23  Evonne Richter NP   dimenhyDRINATE (DRAMAMINE) 50 MG CHEW Take 1 tablet (50 mg) by mouth every 6 hours as needed for other (nausea) 12/15/21   Deanne Bobby MD   hydrOXYzine (VISTARIL) 25 MG capsule Take 1 capsule (25 mg) by mouth 3 times daily as needed for anxiety 8/15/22   Sherron Galdamez MD   levonorgestrel-ethinyl estradiol (SEASONALE) 0.15-0.03 MG tablet Take 1 tablet by mouth daily 4/18/23   Evonne Richter NP   ondansetron (ZOFRAN ODT) 8 MG ODT tab Take 1 tablet (8 mg) by mouth every 8 hours as needed for nausea 4/18/23   Evonne Richter NP   pseudoePHEDrine (SUDAFED) 30 MG tablet Take 1  tablet (30 mg) by mouth every 4 hours as needed for congestion  Patient not taking: Reported on 2/8/2023 7/31/22   Jose Foote MD   sodium chloride (OCEAN) 0.65 % nasal spray As needed for nasal congestion.  Patient not taking: Reported on 2/8/2023 7/31/22   Jose Foote MD         ALLERGIES:  Allergies   Allergen Reactions     Penicillin G Anaphylaxis     Sumatriptan Hives, Swelling and Anxiety     Went to ED after taking 25mg for first time, no objective findings, likely not true allergy     Augmentin Hives     Iohexol Hives and Itching     Pt had hives after premedication.  She had a prior reaction in 2020 at the ER given Iohexol and today was given Iohexol again with premedication of Prednistone 50 mg 13, 7, 1 hour prior.  Pt developed hives on head and abdomen.  Stable vitals HR 95, BP 90/57, O2 SAT 99%.  Pt given water and time and symptoms improved.  No breathing or respiratory issues.   Pt check by RN from primary care clinic.  Benedict     Lansoprazole Hives     Peanut Oil Unknown     Peanut [Peanut Oil] Swelling     Throat swelling         FAMILY HISTORY:  Family History   Problem Relation Age of Onset     Cancer Mother      Asthma Mother      Asthma Sister      Mental Illness Sister      Heart Disease Sister          SOCIAL HISTORY:  Social History     Socioeconomic History     Marital status: Single   Tobacco Use     Smoking status: Every Day     Packs/day: 0.50     Years: 7.00     Pack years: 3.50     Types: Cigarettes     Smokeless tobacco: Never   Vaping Use     Vaping status: Never Used   Substance and Sexual Activity     Alcohol use: No     Comment: Alcoholic Drinks/day: rare     Drug use: Never     Sexual activity: Yes     Partners: Male     Birth control/protection: Condom   Other Topics Concern     Parent/sibling w/ CABG, MI or angioplasty before 65F 55M? No         VITALS:  Patient Vitals for the past 24 hrs:   BP Temp Temp src Pulse Resp SpO2 Weight   04/19/23 1200  109/66 -- -- 77 18 96 % --   04/19/23 0927 (!) 127/90 98.5  F (36.9  C) Oral 117 16 97 % 54 kg (119 lb)       Wt Readings from Last 3 Encounters:   04/19/23 54 kg (119 lb)   04/15/23 54.9 kg (121 lb)   01/09/23 54.9 kg (121 lb 1.6 oz)       Estimated Creatinine Clearance: 123.2 mL/min (based on SCr of 0.59 mg/dL).    PHYSICAL EXAM    Constitutional:  Well developed, Well nourished, NAD, GCS 15  HENT:  Normocephalic, Atraumatic, Bilateral external ears normal, Nose normal. Neck- Supple, No stridor.   Eyes:  PERRL, EOMI, Conjunctiva normal, No discharge.  Respiratory:  Normal breath sounds, No respiratory distress, No wheezing, Speaks full sentences easily. No cough.   Cardiovascular:  Normal heart rate, Regular rhythm, No rubs, No gallops.   GI:  No excessive obesity.  Bowel sounds normal, Soft, mild nofocal abd tenderness, No masses, No flank tenderness. No rebound or guarding.   : deferred  Musculoskeletal:  No cyanosis, No clubbing. Good range of motion in all major joints. No major deformities noted.   Integument:  Warm, Dry, No erythema, No rash.  No petechiae.   Neurologic:  Alert & oriented x 3, Normal motor function, Normal sensory function, No focal deficits noted. Normal gait.   Psychiatric:  Affect normal, Cooperative         LAB:  All pertinent labs reviewed and interpreted.  Recent Results (from the past 24 hour(s))   Symptomatic Influenza A/B, RSV, & SARS-CoV2 PCR (COVID-19) Nasopharyngeal    Collection Time: 04/19/23  9:35 AM    Specimen: Nasopharyngeal; Swab   Result Value Ref Range    Influenza A PCR Negative Negative    Influenza B PCR Negative Negative    RSV PCR Negative Negative    SARS CoV2 PCR Negative Negative   UA with Microscopic reflex to Culture    Collection Time: 04/19/23  9:38 AM    Specimen: Urine, Clean Catch   Result Value Ref Range    Color Urine Yellow Colorless, Straw, Light Yellow, Yellow    Appearance Urine Clear Clear    Glucose Urine Negative Negative mg/dL    Bilirubin  Urine Negative Negative    Ketones Urine Trace (A) Negative mg/dL    Specific Gravity Urine 1.022 1.001 - 1.030    Blood Urine Negative Negative    pH Urine 7.0 5.0 - 7.0    Protein Albumin Urine 10 (A) Negative mg/dL    Urobilinogen Urine <2.0 <2.0 mg/dL    Nitrite Urine Negative Negative    Leukocyte Esterase Urine Negative Negative    Mucus Urine Present (A) None Seen /LPF    RBC Urine 1 <=2 /HPF    WBC Urine 1 <=5 /HPF    Squamous Epithelials Urine 4 (H) <=1 /HPF   HCG qualitative urine    Collection Time: 04/19/23  9:38 AM   Result Value Ref Range    hCG Urine Qualitative Negative Negative   Basic metabolic panel    Collection Time: 04/19/23 10:29 AM   Result Value Ref Range    Sodium 138 136 - 145 mmol/L    Potassium 3.8 3.4 - 5.3 mmol/L    Chloride 102 98 - 107 mmol/L    Carbon Dioxide (CO2) 22 22 - 29 mmol/L    Anion Gap 14 7 - 15 mmol/L    Urea Nitrogen 8.9 6.0 - 20.0 mg/dL    Creatinine 0.59 0.51 - 0.95 mg/dL    Calcium 9.6 8.6 - 10.0 mg/dL    Glucose 82 70 - 99 mg/dL    GFR Estimate >90 >60 mL/min/1.73m2   Hepatic function panel    Collection Time: 04/19/23 10:29 AM   Result Value Ref Range    Protein Total 7.9 6.4 - 8.3 g/dL    Albumin 4.8 3.5 - 5.2 g/dL    Bilirubin Total 0.8 <=1.2 mg/dL    Alkaline Phosphatase 94 35 - 104 U/L    AST 17 10 - 35 U/L    ALT 15 10 - 35 U/L    Bilirubin Direct <0.20 0.00 - 0.30 mg/dL   Lipase    Collection Time: 04/19/23 10:29 AM   Result Value Ref Range    Lipase 14 13 - 60 U/L   Extra Green Top (Lithium Heparin) Tube    Collection Time: 04/19/23 10:29 AM   Result Value Ref Range    Hold Specimen JIC    Extra Purple Top Tube    Collection Time: 04/19/23 10:29 AM   Result Value Ref Range    Hold Specimen JIC    CBC with platelets and differential    Collection Time: 04/19/23 10:29 AM   Result Value Ref Range    WBC Count 7.4 4.0 - 11.0 10e3/uL    RBC Count 4.84 3.80 - 5.20 10e6/uL    Hemoglobin 15.5 11.7 - 15.7 g/dL    Hematocrit 43.4 35.0 - 47.0 %    MCV 90 78 - 100 fL     MCH 32.0 26.5 - 33.0 pg    MCHC 35.7 31.5 - 36.5 g/dL    RDW 11.4 10.0 - 15.0 %    Platelet Count 273 150 - 450 10e3/uL    % Neutrophils 72 %    % Lymphocytes 22 %    % Monocytes 6 %    % Eosinophils 0 %    % Basophils 0 %    % Immature Granulocytes 0 %    NRBCs per 100 WBC 0 <1 /100    Absolute Neutrophils 5.3 1.6 - 8.3 10e3/uL    Absolute Lymphocytes 1.6 0.8 - 5.3 10e3/uL    Absolute Monocytes 0.5 0.0 - 1.3 10e3/uL    Absolute Eosinophils 0.0 0.0 - 0.7 10e3/uL    Absolute Basophils 0.0 0.0 - 0.2 10e3/uL    Absolute Immature Granulocytes 0.0 <=0.4 10e3/uL    Absolute NRBCs 0.0 10e3/uL   Magnesium    Collection Time: 04/19/23 10:29 AM   Result Value Ref Range    Magnesium 1.9 1.7 - 2.3 mg/dL       Lab Results   Component Value Date    ABORH A POS 02/20/2018           RADIOLOGY:  Reviewed all pertinent imaging. Please see official radiology report.    CT Abdomen Pelvis w/o Contrast   Final Result   IMPRESSION:    1.  No significant findings on CT without IV contrast.      2.  Nonobstructing stone upper pole left kidney.         Chest XR,  PA & LAT   Final Result   IMPRESSION: Negative chest.            EKG:    none    PROCEDURES:  None      Medical Decision Making    History:    Supplemental history from: Documented in chart, if applicable    External Record(s) reviewed: Documented in chart, if applicable.    Work Up:    Chart documentation includes differential considered and any EKGs or imaging independently interpreted by provider, where specified.    In additional to work up documented, I considered the following work up: Documented in chart, if applicable.    External consultation:    Discussion of management with another provider: Documented in chart, if applicable    Complicating factors:    Care impacted by chronic illness: N/A    Care affected by social determinants of health: N/A    Disposition considerations: Discharge. I prescribed additional prescription strength medication(s) as charted. N/A.        I,  Jose Willams, am serving as a scribe to document services personally performed by Dr. Yudi Mac based on my observation and the provider's statements to me. I, Dr. Yudi Mac MD attest that Jose Willams is acting in a scribe capacity, has observed my performance of the services and has documented them in accordance with my direction.        Yudi Mac M.D. [facility]  Emergency Medicine and Medical Toxicology  [facility] EMERGENCY DEPARTMENT  [address] [place] [id number]  [phone]  Dept: [phone]           Yudi Mac MD  04/19/23 5413

## 2023-04-19 NOTE — Clinical Note
Renata Staton was seen and treated in our emergency department on 4/19/2023.  She may return to work on 04/21/2023.  Gloria can return to work sooner if she is feeling better.     If you have any questions or concerns, please don't hesitate to call.      Yudi Mac MD

## 2023-04-19 NOTE — ED TRIAGE NOTES
Pt presents tot triage ambulatory for abdominal pain. Pt reports symptoms started yesterday with abdominal pain, diarrhea, HA, confusion, and feeling feverish. Pt reports she was dx with BV two weeks ago. Has finished two courses of antibiotics. Reports fatigue as well.      Triage Assessment     Row Name 04/19/23 0929       Triage Assessment (Adult)    Airway WDL WDL       Cardiac WDL    Cardiac WDL X;rhythm    Pulse Rate & Regularity tachycardic       Peripheral/Neurovascular WDL    Peripheral Neurovascular WDL WDL       Cognitive/Neuro/Behavioral WDL    Cognitive/Neuro/Behavioral WDL WDL

## 2023-04-27 ENCOUNTER — APPOINTMENT (OUTPATIENT)
Dept: MRI IMAGING | Facility: HOSPITAL | Age: 28
End: 2023-04-27
Attending: EMERGENCY MEDICINE
Payer: COMMERCIAL

## 2023-04-27 ENCOUNTER — HOSPITAL ENCOUNTER (EMERGENCY)
Facility: HOSPITAL | Age: 28
Discharge: HOME | End: 2023-04-27
Attending: EMERGENCY MEDICINE | Admitting: EMERGENCY MEDICINE
Payer: COMMERCIAL

## 2023-04-27 VITALS
RESPIRATION RATE: 20 BRPM | SYSTOLIC BLOOD PRESSURE: 108 MMHG | WEIGHT: 119 LBS | OXYGEN SATURATION: 100 % | TEMPERATURE: 98.8 F | HEART RATE: 69 BPM | BODY MASS INDEX: 19.8 KG/M2 | DIASTOLIC BLOOD PRESSURE: 71 MMHG

## 2023-04-27 DIAGNOSIS — R51.9 ACUTE NONINTRACTABLE HEADACHE, UNSPECIFIED HEADACHE TYPE: ICD-10-CM

## 2023-04-27 PROCEDURE — 96374 THER/PROPH/DIAG INJ IV PUSH: CPT

## 2023-04-27 PROCEDURE — 96375 TX/PRO/DX INJ NEW DRUG ADDON: CPT

## 2023-04-27 PROCEDURE — 70551 MRI BRAIN STEM W/O DYE: CPT

## 2023-04-27 PROCEDURE — 250N000011 HC RX IP 250 OP 636: Performed by: EMERGENCY MEDICINE

## 2023-04-27 PROCEDURE — 96361 HYDRATE IV INFUSION ADD-ON: CPT

## 2023-04-27 PROCEDURE — 99285 EMERGENCY DEPT VISIT HI MDM: CPT | Mod: 25

## 2023-04-27 PROCEDURE — 258N000003 HC RX IP 258 OP 636: Performed by: EMERGENCY MEDICINE

## 2023-04-27 RX ORDER — KETOROLAC TROMETHAMINE 15 MG/ML
15 INJECTION, SOLUTION INTRAMUSCULAR; INTRAVENOUS ONCE
Status: COMPLETED | OUTPATIENT
Start: 2023-04-27 | End: 2023-04-27

## 2023-04-27 RX ORDER — DIAZEPAM 10 MG/2ML
2.5 INJECTION, SOLUTION INTRAMUSCULAR; INTRAVENOUS ONCE
Status: COMPLETED | OUTPATIENT
Start: 2023-04-27 | End: 2023-04-27

## 2023-04-27 RX ORDER — DIPHENHYDRAMINE HYDROCHLORIDE 50 MG/ML
25 INJECTION INTRAMUSCULAR; INTRAVENOUS ONCE
Status: COMPLETED | OUTPATIENT
Start: 2023-04-27 | End: 2023-04-27

## 2023-04-27 RX ORDER — DEXAMETHASONE SODIUM PHOSPHATE 10 MG/ML
8 INJECTION, SOLUTION INTRAMUSCULAR; INTRAVENOUS ONCE
Status: COMPLETED | OUTPATIENT
Start: 2023-04-27 | End: 2023-04-27

## 2023-04-27 RX ADMIN — KETOROLAC TROMETHAMINE 15 MG: 15 INJECTION, SOLUTION INTRAMUSCULAR; INTRAVENOUS at 21:29

## 2023-04-27 RX ADMIN — DIPHENHYDRAMINE HYDROCHLORIDE 25 MG: 50 INJECTION, SOLUTION INTRAMUSCULAR; INTRAVENOUS at 22:02

## 2023-04-27 RX ADMIN — SODIUM CHLORIDE 1000 ML: 9 INJECTION, SOLUTION INTRAVENOUS at 21:18

## 2023-04-27 RX ADMIN — PROCHLORPERAZINE EDISYLATE 10 MG: 5 INJECTION, SOLUTION INTRAMUSCULAR; INTRAVENOUS at 21:30

## 2023-04-27 RX ADMIN — DEXAMETHASONE SODIUM PHOSPHATE 8 MG: 10 INJECTION, SOLUTION INTRAMUSCULAR; INTRAVENOUS at 22:02

## 2023-04-27 ASSESSMENT — ACTIVITIES OF DAILY LIVING (ADL)
ADLS_ACUITY_SCORE: 35
ADLS_ACUITY_SCORE: 35

## 2023-04-28 NOTE — ED PROVIDER NOTES
"EMERGENCY DEPARTMENT ENCOUNTER      NAME: Renata Staton  YOB: 1995  MRN: [id number]    FINAL IMPRESSION  1. Acute nonintractable headache, unspecified headache type        MEDICAL DECISION MAKING   Pertinent Labs & Imaging studies reviewed. (See chart for details)    Renata Staton is a 27 year old female who presents for evaluation of a migraine headache.  Patient has a history of migraines but reports that today's feels a bit different in location and severity.  She states that typically, she is able to use Tylenol with improvement but today, has not offered much relief.  She endorses associated photophobia, phonophobia, and nausea but denies fever, trauma, emesis, or other new symptoms.  Patient notes that her mother has a history of brain aneurysms and she is concerned that she might have the same.  She notes that she has a history of allergy to iodine which she states confidently is \"only hives\" and she would like to have a CT scan in spite of this.  Remainder of history and exam, as below.     I considered a broad differential diagnosis including, but not limited to: migraine, tension headache, cluster headache, sinusitis, temporal arteritis. No falls to suggest traumatic epidural/subdural hematoma. Patient has a non-focal neuro exam so have low suspicion for intracranial process such as CVA, SAH, SDH.  She does, however, have a history of aneurysms in her mother which presumably could increase her risk as well.  There are no fever or infections symptoms to suggest meningitis or encephalitis. The patient also denies vision change or ocular pain and has no exam findings to suggest acute angle-closure glaucoma. There is no temporal tenderness, vision change to suggest temporal arteritis and no concerning findings on history or exam to suggest tumor/mass.  Discussed options for work-up and management with the patient.  We have agreed on plan to manage symptoms with IV toradol, compazine, " benadryl and decadron.  We will also proceed with CTA head/neck given family history of aneurysms and subarachnoid.    I spoke with CT tech and radiologist regarding patient's allergy to iodine.  Neuroradiologist recommended pursuing work-up with an MR/MRA of brain instead of the CT to avoid use of contrast.  This was ordered.  Patient was transferred over to radiology department and while there, did have some increasing anxiety and a dose of Valium was ordered.  Unfortunately, she changed her mind and requested to be transported back to the ED.  She reported to her nurse that her young son called and was in tears, requesting that she return home.  Patient stated that she no longer wanted to finish the MRI and instead, wanted to follow-up with her primary care provider.    I met with the patient when she returned to her room.  She reported to feeling near complete resolution of headache after initial interventions.  I did offer to give a dose of Valium and finish MR brain but she felt comfortable with plan for discharge and to follow-up with her primary doctor.  Did explain that without finishing the study, I cannot definitively rule out aneurysm and she verbalized understanding of the risks and benefits of both staying and leaving now.  I encouraged her to continue using Tylenol/Motrin for headaches and to follow-up closely with her primary care provider this coming week.  She is able to tolerate p.o. and ambulatory with a steady gait so I do believe this is a reasonable plan, despite it not being our initial discussion.     Strict return precautions and follow up recommendations were discussed and all questions were answered. Patient endorsed understanding and was in agreement with plan.    Medical Decision Making    History:    Supplemental history from: Documented in chart, if applicable    External Record(s) reviewed: Documented in chart, if applicable.    Work Up:    Chart documentation includes differential  considered and any EKGs or imaging independently interpreted by provider, where specified.    In additional to work up documented, I considered the following work up: Documented in chart, if applicable.    External consultation:    Discussion of management with another provider: Other: Neuro Radiology     Complicating factors:    Care impacted by chronic illness: Mental Health, Smoking / Nicotine Use and Other: Migranie    Care affected by social determinants of health: N/A    Disposition considerations: Discharge. I discussed a prescription for zofran, but deferred after shared decision making discussion.. I considered admission, but discharged the patient after share decision making conversation.          ED COURSE  8:58 PM I met with the patient, obtained history, performed an initial exam, and discussed options and plan for diagnostics and treatment here in the ED.  9:30 PM Called Radiology regarding CT imaging.   9:32 PM Consulted Neuro Radiology, Dr. Vernon.   10:58 PM Nurse informed me that patient is feeling anxious while at CT. Plan to give patient valium.   11:19 PM Nurse informed me that patient is now refusing the MRI and valium; states that she would like to be discharged.   11:22 PM Checked in on and updated patient. We discussed the plan for discharge and the patient is agreeable. Reviewed supportive cares, symptomatic treatment, outpatient follow up, and reasons to return to the Emergency Department. Patient to be discharged by ED RN.       MEDICATIONS GIVEN IN THE ED  Medications   0.9% sodium chloride BOLUS (0 mLs Intravenous Stopped 4/27/23 2328)   diphenhydrAMINE (BENADRYL) injection 25 mg (25 mg Intravenous $Given 4/27/23 2202)   ketorolac (TORADOL) injection 15 mg (15 mg Intravenous $Given 4/27/23 2129)   prochlorperazine (COMPAZINE) injection 10 mg (10 mg Intravenous $Given 4/27/23 2130)   dexamethasone PF (DECADRON) injection 8 mg (8 mg Intravenous $Given 4/27/23 2202)   diazepam (VALIUM)  injection 2.5 mg (2.5 mg Intravenous Not Given 4/27/23 5745)       NEW PRESCRIPTIONS STARTED AT TODAY'S VISIT  Discharge Medication List as of 4/27/2023 11:37 PM             =================================================================    Chief Complaint   Patient presents with     Headache         HPI:    Patient information was obtained from: Patient     Use of : N/A     Renata Staton is a 27 year old female who presents for headache.     Patient endorses a history of migraines and headaches and describes them as a pounding or pressure to the back of her head. Today at 3 PM patient developed sudden onset of headache. States that this headache feels different from the others, as it feels like a burning or pressure. Patient took a tylenol but states it has not helped. Currently endorse light sensitivity and nausea but denies vomiting. Otherwise denies fever, head trauma or any other complaints at this time.     Of note, patient is concerned and requested a brain CT scan preformed as her mother has a history of aneurisms. Patient is allergic to IV contrast, but states that her reaction is hives and she is more than willing to take benadryl in order to have the scan preformed.       RELEVANT HISTORY, MEDICATIONS, & ALLERGIES   Past medical history, surgical history, family history, medications, and allergies reviewed and pertinent noted in HPI.    REVIEW OF SYSTEMS:  A complete review of systems was performed with pertinent positives and negatives noted in the HPI. All other systems negative.     PHYSICAL EXAM:    Vitals: /71   Pulse 69   Temp 98.8  F (37.1  C) (Oral)   Resp 20   Wt 54 kg (119 lb)   LMP 04/25/2023 (Exact Date)   SpO2 100%   BMI 19.80 kg/m     General: Alert and interactive.  Appears uncomfortable but in no acute distress.  HENT: Atraumatic. Full AROM of neck. Conjunctiva clear.  Extraocular movements intact.  Pupils equal, round, and reactive.  Endorses photophobia.   No temporal tenderness.  No tenderness palpation of scalp or facial bones.  Cardiovascular: Regular rate and rhythm.   Chest/Pulmonary: Normal work of breathing. Speaking in complete sentences. Lungs CTAB. No chest wall tenderness or deformities.  Abdomen: Soft, nondistended. Nontender without guarding or rebound.  Extremities: Normal AROM of all major joints.  Skin: Warm and dry. Normal skin color.   Neuro: Speech clear. CNs grossly intact. Moves all extremities spontaneously.  No ataxia.  Strength 5/5 bilateral upper and lower extremities.  Ambulatory.  Psych: Normal affect/mood, cooperative, memory appropriate.    LAB  Labs Ordered and Resulted from Time of ED Arrival to Time of ED Departure - No data to display    RADIOLOGY  MR Brain w/o Contrast   Final Result   IMPRESSION:   1.  No evidence of acute findings on this limited study.            I, Ximena Arguelles, am serving as a scribe to document services personally performed by Dr. Camryn Hunter based on my observation and the provider's statements to me. I, Camryn Hunter MD attest that Ximena Arguelles is acting in a scribe capacity, has observed my performance of the services and has documented them in accordance with my direction.    Camryn Hunter M.D.  Emergency Medicine  [facility] EMERGENCY DEPARTMENT  [facility] [facility] [id number] [phone]  Dept: [phone]     Camryn Hunter MD  04/29/23 0109

## 2023-04-28 NOTE — ED TRIAGE NOTES
Pt has had pressure headache intermittently x 2 weeks. Pt gets migraine headaches but his feels different. Pt has been taking tylenol without relief. Nausea without vomiting; photosensitive with eye pain.      Triage Assessment       Row Name 04/27/23 2042       Triage Assessment (Adult)    Airway WDL WDL       Respiratory WDL    Respiratory WDL WDL       Skin Circulation/Temperature WDL    Skin Circulation/Temperature WDL WDL       Cardiac WDL    Cardiac WDL WDL       Peripheral/Neurovascular WDL    Peripheral Neurovascular WDL WDL       Cognitive/Neuro/Behavioral WDL    Cognitive/Neuro/Behavioral WDL WDL                Pt has an appt to see neurology on 10/20

## 2023-04-28 NOTE — DISCHARGE INSTRUCTIONS
You were seen in the Emergency Department today for a headache.       Please return to the ER if you experience worsening headache, inability to keep food/fluids down, and/or for any other new or concerning symptoms, otherwise please follow up with your primary doctor for recheck.     Below is some information you might find useful.     Thank you for choosing [facility]. It was a pleasure taking care of you today!  - Dr. Camryn Hunter

## 2023-04-30 ENCOUNTER — HOSPITAL ENCOUNTER (EMERGENCY)
Facility: HOSPITAL | Age: 28
Discharge: LEFT BEFORE DISCHARGE | End: 2023-04-30
Admitting: STUDENT IN AN ORGANIZED HEALTH CARE EDUCATION/TRAINING PROGRAM
Payer: COMMERCIAL

## 2023-04-30 VITALS
RESPIRATION RATE: 12 BRPM | TEMPERATURE: 97.7 F | HEART RATE: 116 BPM | HEIGHT: 65 IN | OXYGEN SATURATION: 100 % | DIASTOLIC BLOOD PRESSURE: 83 MMHG | WEIGHT: 118 LBS | SYSTOLIC BLOOD PRESSURE: 155 MMHG | BODY MASS INDEX: 19.66 KG/M2

## 2023-04-30 LAB
ALBUMIN SERPL BCG-MCNC: 4.5 G/DL (ref 3.5–5.2)
ALP SERPL-CCNC: 87 U/L (ref 35–104)
ALT SERPL W P-5'-P-CCNC: 14 U/L (ref 10–35)
ANION GAP SERPL CALCULATED.3IONS-SCNC: 11 MMOL/L (ref 7–15)
AST SERPL W P-5'-P-CCNC: 18 U/L (ref 10–35)
BASOPHILS # BLD AUTO: 0 10E3/UL (ref 0–0.2)
BASOPHILS NFR BLD AUTO: 0 %
BILIRUB DIRECT SERPL-MCNC: <0.2 MG/DL (ref 0–0.3)
BILIRUB SERPL-MCNC: 0.3 MG/DL
BUN SERPL-MCNC: 6.7 MG/DL (ref 6–20)
CALCIUM SERPL-MCNC: 9.3 MG/DL (ref 8.6–10)
CHLORIDE SERPL-SCNC: 105 MMOL/L (ref 98–107)
CREAT SERPL-MCNC: 0.57 MG/DL (ref 0.51–0.95)
DEPRECATED HCO3 PLAS-SCNC: 24 MMOL/L (ref 22–29)
EOSINOPHIL # BLD AUTO: 0 10E3/UL (ref 0–0.7)
EOSINOPHIL NFR BLD AUTO: 1 %
ERYTHROCYTE [DISTWIDTH] IN BLOOD BY AUTOMATED COUNT: 11.3 % (ref 10–15)
GFR SERPL CREATININE-BSD FRML MDRD: >90 ML/MIN/1.73M2
GLUCOSE SERPL-MCNC: 92 MG/DL (ref 70–99)
HCG SERPL QL: NEGATIVE
HCT VFR BLD AUTO: 40.2 % (ref 35–47)
HGB BLD-MCNC: 14.1 G/DL (ref 11.7–15.7)
IMM GRANULOCYTES # BLD: 0 10E3/UL
IMM GRANULOCYTES NFR BLD: 0 %
LYMPHOCYTES # BLD AUTO: 2.3 10E3/UL (ref 0.8–5.3)
LYMPHOCYTES NFR BLD AUTO: 28 %
MCH RBC QN AUTO: 31.8 PG (ref 26.5–33)
MCHC RBC AUTO-ENTMCNC: 35.1 G/DL (ref 31.5–36.5)
MCV RBC AUTO: 91 FL (ref 78–100)
MONOCYTES # BLD AUTO: 0.5 10E3/UL (ref 0–1.3)
MONOCYTES NFR BLD AUTO: 6 %
NEUTROPHILS # BLD AUTO: 5.3 10E3/UL (ref 1.6–8.3)
NEUTROPHILS NFR BLD AUTO: 65 %
NRBC # BLD AUTO: 0 10E3/UL
NRBC BLD AUTO-RTO: 0 /100
PLATELET # BLD AUTO: 231 10E3/UL (ref 150–450)
POTASSIUM SERPL-SCNC: 3.8 MMOL/L (ref 3.4–5.3)
PROT SERPL-MCNC: 7.1 G/DL (ref 6.4–8.3)
RBC # BLD AUTO: 4.44 10E6/UL (ref 3.8–5.2)
SODIUM SERPL-SCNC: 140 MMOL/L (ref 136–145)
WBC # BLD AUTO: 8.1 10E3/UL (ref 4–11)

## 2023-04-30 PROCEDURE — 999N000104 HC STATISTIC NO CHARGE

## 2023-04-30 PROCEDURE — 85025 COMPLETE CBC W/AUTO DIFF WBC: CPT | Performed by: EMERGENCY MEDICINE

## 2023-04-30 PROCEDURE — 250N000011 HC RX IP 250 OP 636: Performed by: EMERGENCY MEDICINE

## 2023-04-30 PROCEDURE — 36415 COLL VENOUS BLD VENIPUNCTURE: CPT | Performed by: EMERGENCY MEDICINE

## 2023-04-30 PROCEDURE — 96374 THER/PROPH/DIAG INJ IV PUSH: CPT

## 2023-04-30 PROCEDURE — 82310 ASSAY OF CALCIUM: CPT | Performed by: EMERGENCY MEDICINE

## 2023-04-30 PROCEDURE — 96375 TX/PRO/DX INJ NEW DRUG ADDON: CPT

## 2023-04-30 PROCEDURE — 82248 BILIRUBIN DIRECT: CPT | Performed by: EMERGENCY MEDICINE

## 2023-04-30 PROCEDURE — 258N000003 HC RX IP 258 OP 636: Performed by: EMERGENCY MEDICINE

## 2023-04-30 PROCEDURE — 84703 CHORIONIC GONADOTROPIN ASSAY: CPT | Performed by: EMERGENCY MEDICINE

## 2023-04-30 RX ORDER — DIPHENHYDRAMINE HYDROCHLORIDE 50 MG/ML
25 INJECTION INTRAMUSCULAR; INTRAVENOUS ONCE
Status: COMPLETED | OUTPATIENT
Start: 2023-04-30 | End: 2023-04-30

## 2023-04-30 RX ORDER — ONDANSETRON 2 MG/ML
4 INJECTION INTRAMUSCULAR; INTRAVENOUS
Status: DISCONTINUED | OUTPATIENT
Start: 2023-04-30 | End: 2023-04-30 | Stop reason: HOSPADM

## 2023-04-30 RX ADMIN — SODIUM CHLORIDE 1000 ML: 9 INJECTION, SOLUTION INTRAVENOUS at 13:54

## 2023-04-30 RX ADMIN — DIPHENHYDRAMINE HYDROCHLORIDE 25 MG: 50 INJECTION, SOLUTION INTRAMUSCULAR; INTRAVENOUS at 13:54

## 2023-04-30 RX ADMIN — PROCHLORPERAZINE EDISYLATE 10 MG: 5 INJECTION, SOLUTION INTRAMUSCULAR; INTRAVENOUS at 13:54

## 2023-04-30 NOTE — ED TRIAGE NOTES
The pt was seen at [facility] for these same sx's x 3 days ago. Pressure in her head, nose, eyes and left eye skin numbness. She had an MRI but needed ativan at that time. The pt had to go home for childcare at that time. The pt was told to return if her sx's had not gotten any better; pt states the sx's are not any better at this time.

## 2023-04-30 NOTE — ED NOTES
Pt came and notified writer that she had to leave immediately as her boyfriend had called her and stated she needed to get home. Pt allowed me to remove IV but would not stay to speak to a provider.

## 2023-05-10 ENCOUNTER — VIRTUAL VISIT (OUTPATIENT)
Dept: FAMILY MEDICINE | Facility: CLINIC | Age: 28
End: 2023-05-10
Payer: COMMERCIAL

## 2023-05-10 ENCOUNTER — MYC MEDICAL ADVICE (OUTPATIENT)
Dept: FAMILY MEDICINE | Facility: CLINIC | Age: 28
End: 2023-05-10

## 2023-05-10 DIAGNOSIS — J02.9 SORE THROAT: Primary | ICD-10-CM

## 2023-05-10 PROCEDURE — 99213 OFFICE O/P EST LOW 20 MIN: CPT | Mod: VID | Performed by: FAMILY MEDICINE

## 2023-05-10 ASSESSMENT — ENCOUNTER SYMPTOMS
PSYCHIATRIC NEGATIVE: 1
NEUROLOGICAL NEGATIVE: 1
HEMATOLOGIC/LYMPHATIC NEGATIVE: 1
SORE THROAT: 1
COUGH: 1
ALLERGIC/IMMUNOLOGIC NEGATIVE: 1
CONSTITUTIONAL NEGATIVE: 1
ENDOCRINE NEGATIVE: 1
MUSCULOSKELETAL NEGATIVE: 1

## 2023-05-10 NOTE — PROGRESS NOTES
Gloria is a 27 year old who is being evaluated via a billable video visit.      How would you like to obtain your AVS? MyChart  If the video visit is dropped, the invitation should be resent by: Cell phone to cell phone.  Will anyone else be joining your video visit? No        Assessment & Plan     (J02.9) Sore throat  (primary encounter diagnosis)  Comment: Patient will be notified of results  Plan: Streptococcus A Rapid Screen w/Reflex to PCR -         Clinic Collect                   FUTURE APPOINTMENTS:       - Follow-up visit as needed.    William Magallon MD  [facility]    Subjective   Gloria is a 27 year old, presenting for the following health issues:    Patient is a 27 yr old female here for sore throat ongoing for a couple of days.   Says some people at work were sick with similar symptoms. Also c/o of a dry cough. Denies any fevers or chills. She has had some congestion.   Pharyngitis (Throat is sore and also the back of the tongue.) and New medication to add (Sertraline 50 mg once daily.  Started again 3 weeks ago.)        5/10/2023     1:55 PM   Additional Questions   Roomed by Hilaria Teresa CMA     Pharyngitis   Associated symptoms include cough.   History of Present Illness       Reason for visit:  Sore throat and back of tongue    She eats 0-1 servings of fruits and vegetables daily.She consumes 2 sweetened beverage(s) daily.She exercises with enough effort to increase her heart rate 20 to 29 minutes per day.  She exercises with enough effort to increase her heart rate 3 or less days per week. She is missing 1 dose(s) of medications per week.  She is not taking prescribed medications regularly due to remembering to take.       Acute Illness  Acute illness concerns: Sore throat and pain at the back of the tongue.  Onset/Duration: 2 days.  Symptoms:  Fever: No  Chills/Sweats: No  Headache (location?): No  Sinus Pressure: YES- pressure is bilateral.  Can have post nasal  drainage at times.  Conjunctivitis:  Were watery yesterday.  Ear Pain: No pain, the ears feel itchy.  Rhinorrhea: No  Congestion: YES- Nasal congestion.  Sore Throat: YES- sore throat and a sore spot at the back of the tongue.  It can be worse with talking.  She has recent dentures and has been able to chew her food yet with them.  She is gumming her food and is not sure if that is causing issues.  Cough: YES-non-productive-dry cough at times.  Wheeze: No  Decreased Appetite: No  Nausea: YES  Vomiting: No  Diarrhea: YES- looser stool.  Dysuria/Freq.: No  Dysuria or Hematuria: No  Fatigue/Achiness: YES- Fatigue.  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  They have had sore throats.  Therapies tried and outcome: Nyquil and Dayquil as needed.            Review of Systems   Constitutional: Negative.    HENT: Positive for sore throat.    Respiratory: Positive for cough.    Endocrine: Negative.    Breasts:  negative.    Genitourinary: Negative.    Musculoskeletal: Negative.    Allergic/Immunologic: Negative.    Neurological: Negative.    Hematological: Negative.    Psychiatric/Behavioral: Negative.             Objective    Vitals - Patient Reported  Pain Score: Moderate Pain (5)  Pain Loc: Throat        Physical Exam   GENERAL: Healthy, alert and no distress  EYES: Eyes grossly normal to inspection.  No discharge or erythema, or obvious scleral/conjunctival abnormalities.  RESP: No audible wheeze, cough, or visible cyanosis.  No visible retractions or increased work of breathing.    SKIN: Visible skin clear. No significant rash, abnormal pigmentation or lesions.  NEURO: Cranial nerves grossly intact.  Mentation and speech appropriate for age.  PSYCH: Mentation appears normal, affect normal/bright, judgement and insight intact, normal speech and appearance well-groomed.          Video-Visit Details    Type of service:  Video Visit   Video Start Time: 3:08 PM  Video End Time:3:10  PM    Originating Location (pt. Location): Home  Distant Location (provider location):  Off-site  Platform used for Video Visit: Joselyn

## 2023-05-11 ENCOUNTER — LAB (OUTPATIENT)
Dept: FAMILY MEDICINE | Facility: CLINIC | Age: 28
End: 2023-05-11
Attending: FAMILY MEDICINE
Payer: COMMERCIAL

## 2023-05-11 DIAGNOSIS — J02.9 SORE THROAT: ICD-10-CM

## 2023-05-11 LAB
DEPRECATED S PYO AG THROAT QL EIA: NEGATIVE
GROUP A STREP BY PCR: NOT DETECTED

## 2023-05-11 PROCEDURE — 87651 STREP A DNA AMP PROBE: CPT

## 2023-05-11 PROCEDURE — 99207 PR NO CHARGE LOS: CPT

## 2023-05-12 NOTE — RESULT ENCOUNTER NOTE
Please inform patient that test result was within normal parameters.   Thank you.     William Magallon M.D.

## 2023-05-18 ENCOUNTER — HOSPITAL ENCOUNTER (EMERGENCY)
Facility: HOSPITAL | Age: 28
Discharge: HOME | End: 2023-05-18
Attending: EMERGENCY MEDICINE | Admitting: EMERGENCY MEDICINE
Payer: COMMERCIAL

## 2023-05-18 ENCOUNTER — APPOINTMENT (OUTPATIENT)
Dept: RADIOLOGY | Facility: HOSPITAL | Age: 28
End: 2023-05-18
Attending: EMERGENCY MEDICINE
Payer: COMMERCIAL

## 2023-05-18 VITALS
WEIGHT: 118 LBS | HEIGHT: 65 IN | BODY MASS INDEX: 19.66 KG/M2 | HEART RATE: 67 BPM | TEMPERATURE: 99.4 F | SYSTOLIC BLOOD PRESSURE: 107 MMHG | OXYGEN SATURATION: 100 % | RESPIRATION RATE: 20 BRPM | DIASTOLIC BLOOD PRESSURE: 71 MMHG

## 2023-05-18 DIAGNOSIS — J20.9 ACUTE BRONCHITIS, UNSPECIFIED ORGANISM: ICD-10-CM

## 2023-05-18 LAB
ANION GAP SERPL CALCULATED.3IONS-SCNC: 13 MMOL/L (ref 7–15)
BASOPHILS # BLD AUTO: 0 10E3/UL (ref 0–0.2)
BASOPHILS NFR BLD AUTO: 1 %
BUN SERPL-MCNC: 5.7 MG/DL (ref 6–20)
CALCIUM SERPL-MCNC: 9.4 MG/DL (ref 8.6–10)
CHLORIDE SERPL-SCNC: 105 MMOL/L (ref 98–107)
CREAT SERPL-MCNC: 0.64 MG/DL (ref 0.51–0.95)
DEPRECATED HCO3 PLAS-SCNC: 20 MMOL/L (ref 22–29)
EOSINOPHIL # BLD AUTO: 0 10E3/UL (ref 0–0.7)
EOSINOPHIL NFR BLD AUTO: 0 %
ERYTHROCYTE [DISTWIDTH] IN BLOOD BY AUTOMATED COUNT: 11.4 % (ref 10–15)
GFR SERPL CREATININE-BSD FRML MDRD: >90 ML/MIN/1.73M2
GLUCOSE SERPL-MCNC: 84 MG/DL (ref 70–99)
HCT VFR BLD AUTO: 39.2 % (ref 35–47)
HGB BLD-MCNC: 14.2 G/DL (ref 11.7–15.7)
IMM GRANULOCYTES # BLD: 0 10E3/UL
IMM GRANULOCYTES NFR BLD: 0 %
LYMPHOCYTES # BLD AUTO: 2.9 10E3/UL (ref 0.8–5.3)
LYMPHOCYTES NFR BLD AUTO: 44 %
MCH RBC QN AUTO: 32.3 PG (ref 26.5–33)
MCHC RBC AUTO-ENTMCNC: 36.2 G/DL (ref 31.5–36.5)
MCV RBC AUTO: 89 FL (ref 78–100)
MONOCYTES # BLD AUTO: 0.5 10E3/UL (ref 0–1.3)
MONOCYTES NFR BLD AUTO: 8 %
NEUTROPHILS # BLD AUTO: 3 10E3/UL (ref 1.6–8.3)
NEUTROPHILS NFR BLD AUTO: 47 %
NRBC # BLD AUTO: 0 10E3/UL
NRBC BLD AUTO-RTO: 0 /100
PLATELET # BLD AUTO: 256 10E3/UL (ref 150–450)
POTASSIUM SERPL-SCNC: 3.5 MMOL/L (ref 3.4–5.3)
RBC # BLD AUTO: 4.39 10E6/UL (ref 3.8–5.2)
SODIUM SERPL-SCNC: 138 MMOL/L (ref 136–145)
TROPONIN T SERPL HS-MCNC: <6 NG/L
WBC # BLD AUTO: 6.5 10E3/UL (ref 4–11)

## 2023-05-18 PROCEDURE — 94640 AIRWAY INHALATION TREATMENT: CPT

## 2023-05-18 PROCEDURE — 36415 COLL VENOUS BLD VENIPUNCTURE: CPT | Performed by: EMERGENCY MEDICINE

## 2023-05-18 PROCEDURE — 84484 ASSAY OF TROPONIN QUANT: CPT | Performed by: EMERGENCY MEDICINE

## 2023-05-18 PROCEDURE — 99285 EMERGENCY DEPT VISIT HI MDM: CPT | Mod: 25

## 2023-05-18 PROCEDURE — 80048 BASIC METABOLIC PNL TOTAL CA: CPT | Performed by: EMERGENCY MEDICINE

## 2023-05-18 PROCEDURE — 250N000013 HC RX MED GY IP 250 OP 250 PS 637: Performed by: EMERGENCY MEDICINE

## 2023-05-18 PROCEDURE — 71046 X-RAY EXAM CHEST 2 VIEWS: CPT

## 2023-05-18 PROCEDURE — 93005 ELECTROCARDIOGRAM TRACING: CPT | Performed by: EMERGENCY MEDICINE

## 2023-05-18 PROCEDURE — 85025 COMPLETE CBC W/AUTO DIFF WBC: CPT | Performed by: EMERGENCY MEDICINE

## 2023-05-18 PROCEDURE — 93005 ELECTROCARDIOGRAM TRACING: CPT | Performed by: STUDENT IN AN ORGANIZED HEALTH CARE EDUCATION/TRAINING PROGRAM

## 2023-05-18 RX ORDER — ALBUTEROL SULFATE 90 UG/1
2 AEROSOL, METERED RESPIRATORY (INHALATION) EVERY 6 HOURS PRN
Qty: 18 G | Refills: 0 | Status: SHIPPED | OUTPATIENT
Start: 2023-05-18

## 2023-05-18 RX ORDER — DOXYCYCLINE 100 MG/1
100 CAPSULE ORAL 2 TIMES DAILY
Qty: 14 CAPSULE | Refills: 0 | Status: SHIPPED | OUTPATIENT
Start: 2023-05-18 | End: 2023-10-19

## 2023-05-18 RX ORDER — ALBUTEROL SULFATE 90 UG/1
2 AEROSOL, METERED RESPIRATORY (INHALATION) EVERY 6 HOURS PRN
Status: DISCONTINUED | OUTPATIENT
Start: 2023-05-18 | End: 2023-05-18 | Stop reason: HOSPADM

## 2023-05-18 RX ORDER — PREDNISONE 20 MG/1
TABLET ORAL
Qty: 9 TABLET | Refills: 0 | Status: SHIPPED | OUTPATIENT
Start: 2023-05-18 | End: 2023-05-28

## 2023-05-18 RX ADMIN — ALBUTEROL SULFATE 2 PUFF: 90 AEROSOL, METERED RESPIRATORY (INHALATION) at 19:59

## 2023-05-18 NOTE — ED TRIAGE NOTES
Pt have been sick for a couple of weeks of body aches, sore throat, and weakness. Covid test was done but pending results: will not get back til tomorrow morning, checked for strep throat last Thursday and was negative, took robitussin at 2pm minimal relief. Pt here for left upper chest pain radiating to her left arm, arm pain described as sharp, and short of breath.       Triage Assessment     Row Name 05/18/23 1033       Triage Assessment (Adult)    Airway WDL WDL       Respiratory WDL    Respiratory WDL X       Cardiac WDL    Cardiac WDL chest pain       Chest Pain Assessment    Chest Pain Location anterior chest, left

## 2023-05-19 NOTE — ED PROVIDER NOTES
EMERGENCY DEPARTMENT ENCOUNTER      NAME: Renata Staton  AGE: 27 year old female  YOB: 1995  MRN: [id number]  EVALUATION DATE & TIME: 5/18/2023  7:17 PM    PCP: Andria Hernández    ED PROVIDER: Peterson Jimenes M.D.      Chief Complaint   Patient presents with     Chest Pain     Shortness of Breath         FINAL IMPRESSION:  Atypical chest pain  Bronchitis      ED COURSE & MEDICAL DECISION MAKING:    Pertinent Labs & Imaging studies reviewed. (See chart for details)  27 year old female presents to the Emergency Department for evaluation of chest pain, sore throat, ongoing general malaise.  Patient has not felt well for a few weeks.  She has had slight cough and sore throat.  Has been seen in a number of occasions with evaluation being performed none of which are conclusive.  Did have a strep screen done yesterday which was negative.  COVID swab sent earlier today results unknown.  Patient presenting today with an achiness in the left side of her chest with radiation into her arm.  Patient without underlying cardiac risk factors.  EKG obtained from triage area is unremarkable.  Patient is a smoker.  Smokes perhaps close to half a pack of cigarettes a day.  No other family members ill.  On exam she is well-nourished follow-up female mild distress.  She has some slight rhonchi in the upper lobes.  Could be simple mucous issues versus early infiltrative disease.  But her evaluation initiated from triage.  Will add chest x-ray to assess for infiltrates.  Patient appears non toxic with stable vitals signs. Overall exam is benign.    7:35 PM I met with the patient for the initial interview and physical examination. Discussed plan for treatment and workup in the ED.    7:47 PM Rechecked and updated patient.   8:28 PM.  Troponin is normal.  Basic metabolic panel unremarkable.  CBC normal.  Chest x-ray is normal.  Did treat patient symptomatically with 2 puffs of albuterol.  Will reassess.  8:30 PM.  Patient  reports feeling improved after the inhaler.  We will continue outpatient inhaler use along with prednisone and doxycycline for potential atypical pneumonia/bronchitis        At the conclusion of the encounter I discussed the results of all of the tests and the disposition. The questions were answered and return precautions provided. The patient or family acknowledged understanding and was agreeable with the care plan.         Medical Decision Making    History:    Supplemental history from: Documented in chart, if applicable    External Record(s) reviewed: Documented in chart, if applicable.    Work Up:    Chart documentation includes differential considered and any EKGs or imaging independently interpreted by provider, where specified.    In additional to work up documented, I considered the following work up: Documented in chart, if applicable.    External consultation:    Discussion of management with another provider: Documented in chart, if applicable    Complicating factors:    Care impacted by chronic illness: Chronic Pain and Mental Health    Care affected by social determinants of health: N/A    Disposition considerations: Discharge. I prescribed additional prescription strength medication(s) as charted. See documentation for any additional details.      MEDICATIONS GIVEN IN THE EMERGENCY:  Medications   albuterol (PROVENTIL HFA/VENTOLIN HFA) inhaler (2 puffs Inhalation $Given 5/18/23 1959)       NEW PRESCRIPTIONS STARTED AT TODAY'S ER VISIT  Current Discharge Medication List      START taking these medications    Details   albuterol (PROAIR HFA/PROVENTIL HFA/VENTOLIN HFA) 108 (90 Base) MCG/ACT inhaler Inhale 2 puffs into the lungs every 6 hours as needed for shortness of breath, wheezing or cough  Qty: 18 g, Refills: 0    Comments: Pharmacy may dispense brand covered by insurance (Proair, or proventil or ventolin or generic albuterol inhaler)      doxycycline hyclate (VIBRAMYCIN) 100 MG capsule Take 1  capsule (100 mg) by mouth 2 times daily  Qty: 14 capsule, Refills: 0      predniSONE (DELTASONE) 20 MG tablet 2 tabs day 1-2, then 1 tab days 3-4, then 1/2 tab daily for 6 days  Qty: 9 tablet, Refills: 0                =================================================================    HPI    Patient information was obtained from: Patient    Use of Intrepreter: N/A         Renata Staton is a 27 year old female with a pertient medical history of anemia, protein-calorie malnutrition, gastritis, GERD, chronic abdominal pain, chronic pelvic pain, acute gallstone pancreatitis, cholelithiasis and acute cholecystitis with obstruction, renal lithiasis, kidney disease, chronic constipation, hypokalemia, anxiety, depressive disorder, who presents to the ED for evaluation of a sore throat and chest pain.    Per Chart Review, patient was seen in the [facility]'s ED on 04/27/23 for evaluation of a migraine headache. Patient had a non-focal neuro exam. Patient symptoms managed with IV toradol, compazine, benadryl and decadron. Patient was transferred over to radiology department and while there, did have some increasing anxiety and a dose of Valium was ordered.  Unfortunately, she changed her mind and requested to be transported back to the ED. She reported to her nurse that her young son called and was in tears, requesting that she return home. Patient stated that she no longer wanted to finish MRI imaging and instead, wanted to follow-up with her primary care provider. MR imaging with no evidence of acute findings on the limited study Patient reported to feeling near complete resolution of headache after initial interventions. Patient was discharged in stable condition.      Per Chart Review, patient was seen at [facility] on 05/10/23 for evaluation of a couple of days of sore throat. Strep test resulted negative. Patient was discharged in stable condition.     Per Chart Review,  patient was seen at [facility] earlier today for evaluation of approximately 2 weeks worth of a sore throat. Strep test in Urgent Care one week prior reported to be negative, but no COVID-19 test was done, so COVID-19 test ordered in clinic. Patient was discharged in stable condition notified that she will be called and informed of the COVID-19 results when they returned.     Patient endorses the histories above and states that she was told she would be notified of her her COVID-19 results tomorrow. She endorses being vaccinated against COVID-19. She denies ever jordyn COVID-19 in the past. She reports having a sore throat, soft stools, and chest congestion for a couple of weeks, and she denies any relief from taking Robitussin at 2:00 PM today. She also endorses developing chills and chest pain that radiates down her left arm with associated left hand paraesthesias today, but she denies any recent fever, vomiting, diarrhea, or any other complications at this time. She endorses having seasonal allergies, but she denies any relief of her symptoms with recent Benadryl use. She denies anyone else being sick/ill at home. She endorses smoking regularly and states that recently she has cut down to 6 cigarettes daily due to her symptoms.     REVIEW OF SYSTEMS   Constitutional:  Positive for chills. Denies fever.  HENT: Positive for sore throat. Positive for congestion (chest).   Respiratory:  Denies productive cough or increased work of breathing  Cardiovascular:  Positive for chest pain. Denies palpitations  GI:  Denies abdominal pain, nausea, vomiting, or change in bowel or bladder habits   Musculoskeletal: Positive for left arm pain (secondary to chest pain). Denies any new muscle/joint swelling  Skin:  Denies rash   Neurologic: Positive for paraesthesias (left hand). Denies focal weakness  All systems negative except as marked.     PAST MEDICAL HISTORY:  Past Medical History:   Diagnosis Date      Anxiety      Chronic abdominal pain      Chronic pelvic pain in female      Depression      Depressive disorder 2017     History of anesthesia complications     family issues slow to wake up after surgery     Migraine        PAST SURGICAL HISTORY:  Past Surgical History:   Procedure Laterality Date     ABDOMEN SURGERY       COLONOSCOPY  10/28     ENDOSCOPIC RETROGRADE CHOLANGIOPANCREATOGRAM N/A 12/18/2019    Procedure: ENDOSCOPIC RETROGRADE CHOLANGIOPANCREATOGRAPHY with billary sphinctomy and stone extraction and  billary stent placement with epinephrine injection ;  Surgeon: Dustin Betancourt MD;  Location: [facility];  Service: Gastroenterology     ENDOSCOPIC RETROGRADE CHOLANGIOPANCREATOGRAM N/A 02/28/2020    Procedure: ENDOSCOPIC RETROGRADE CHOLANGIOPANCREATOGRAPHY, STENT REMOVAL, SLUDGE REMOVAL;  Surgeon: Dustin Betancourt MD;  Location: [facility] OR;  Service: Gastroenterology     LAPAROSCOPIC CHOLECYSTECTOMY N/A 12/11/2019    Procedure: CHOLECYSTECTOMY, LAPAROSCOPIC;  Surgeon: Samia Portillo MD;  Location: [facility] OR;  Service: General     IN LAP,DIAGNOSTIC ABDOMEN N/A 02/20/2018    Procedure: DIAGNOSTIC LAPAROSCOPY, LYSIS OF  ADHESIONS;  Surgeon: Manish Lyons MD;  Location: [facility];  Service: Gynecology     XR ERCP BILIARY ONLY  12/18/2019     XR ERCP BILIARY ONLY  02/28/2020         CURRENT MEDICATIONS:      Current Facility-Administered Medications:      albuterol (PROVENTIL HFA/VENTOLIN HFA) inhaler, 2 puff, Inhalation, Q6H PRN, Peterson Jimenes MD, 2 puff at 05/18/23 1959    Current Outpatient Medications:      cholecalciferol 50 MCG (2000 UT) CAPS, Take 2,000 Units by mouth daily, Disp: , Rfl:      cyanocobalamin (VITAMIN B-12) 1000 MCG tablet, Take 1,000 mcg by mouth daily (Patient not taking: Reported on 5/10/2023), Disp: , Rfl:      dexamethasone (DECADRON) 4 MG tablet, Take 1 tablet (4 mg) by mouth once for 1 dose, Disp: 1 tablet, Rfl: 0      dimenhyDRINATE (DRAMAMINE) 50 MG CHEW, Take 1 tablet (50 mg) by mouth every 6 hours as needed for other (nausea), Disp: 10 tablet, Rfl: 0     hydrOXYzine (VISTARIL) 25 MG capsule, Take 1 capsule (25 mg) by mouth 3 times daily as needed for anxiety, Disp: 20 capsule, Rfl: 0     levonorgestrel-ethinyl estradiol (SEASONALE) 0.15-0.03 MG tablet, Take 1 tablet by mouth daily (Patient not taking: Reported on 5/10/2023), Disp: 84 tablet, Rfl: 1     ondansetron (ZOFRAN ODT) 4 MG ODT tab, Take 1 tablet (4 mg) by mouth every 8 hours as needed for nausea or vomiting, Disp: 10 tablet, Rfl: 0     pseudoePHEDrine (SUDAFED) 30 MG tablet, Take 1 tablet (30 mg) by mouth every 4 hours as needed for congestion (Patient not taking: Reported on 2/8/2023), Disp: 20 tablet, Rfl: 0     sodium chloride (OCEAN) 0.65 % nasal spray, As needed for nasal congestion. (Patient not taking: Reported on 2/8/2023), Disp: 30 mL, Rfl: 0    ALLERGIES:  Allergies   Allergen Reactions     Penicillin G Anaphylaxis     Sumatriptan Hives, Swelling and Anxiety     Went to ED after taking 25mg for first time, no objective findings, likely not true allergy     Amoxicillin-Pot Clavulanate Hives     Iohexol Hives and Itching     Pt had hives after premedication.  She had a prior reaction in 2020 at the ER given Iohexol and today was given Iohexol again with premedication of Prednistone 50 mg 13, 7, 1 hour prior.  Pt developed hives on head and abdomen.  Stable vitals HR 95, BP 90/57, O2 SAT 99%.  Pt given water and time and symptoms improved.  No breathing or respiratory issues.   Pt check by RN from primary care clinic.  Benedict     Lansoprazole Hives     Peanut Oil Unknown     Peanut [Peanut Oil] Swelling     Throat swelling       FAMILY HISTORY:  Family History   Problem Relation Age of Onset     Cancer Mother      Asthma Mother      Asthma Sister      Mental Illness Sister      Heart Disease Sister        SOCIAL HISTORY:   Social History     Socioeconomic  "History     Marital status: Single   Tobacco Use     Smoking status: Every Day     Packs/day: 0.25     Years: 7.00     Pack years: 1.75     Types: Cigarettes     Smokeless tobacco: Never     Tobacco comments:     7-8 cpd.   Vaping Use     Vaping status: Never Used   Substance and Sexual Activity     Alcohol use: No     Comment: Alcoholic Drinks/day: rare     Drug use: Never     Sexual activity: Yes     Partners: Male     Birth control/protection: Condom   Other Topics Concern     Parent/sibling w/ CABG, MI or angioplasty before 65F 55M? No       VITALS:  Patient Vitals for the past 24 hrs:   BP Temp Temp src Pulse Resp SpO2 Height Weight   05/18/23 1850 125/77 99.4  F (37.4  C) Temporal 89 18 99 % 1.651 m (5' 5\") 53.5 kg (118 lb)        PHYSICAL EXAM    Constitutional:  Awake, alert, in mild apparent distress  HENT:  Normocephalic, Atraumatic. Bilateral external ears normal. Oropharynx moist. Nose normal. Neck- Normal range of motion with no guarding, No midline cervical tenderness, Supple, No stridor.   Eyes:  PERRL, EOMI with no signs of entrapment, Conjunctiva normal, No discharge.   Respiratory:  Rhonchi of bilateral upper lobes, left greater than right.   Cardiovascular:  Normal heart rate, Normal rhythm, No appreciable rubs or gallops.   GI:  Soft, No tenderness, No distension, No palpable masses  Musculoskeletal:  Intact distal pulses, No edema. Good range of motion in all major joints. No tenderness to palpation or major deformities noted.  Integument:  Warm, Dry, No erythema, No rash.   Neurologic:  Alert & oriented, Normal motor function, Normal sensory function, No focal deficits noted.   Psychiatric:  Affect normal, Judgment normal, Mood normal.     LAB:  All pertinent labs reviewed and interpreted.  Results for orders placed or performed during the hospital encounter of 05/18/23   Chest XR,  PA & LAT    Impression    IMPRESSION: Negative chest.   Basic metabolic panel   Result Value Ref Range    Sodium " 138 136 - 145 mmol/L    Potassium 3.5 3.4 - 5.3 mmol/L    Chloride 105 98 - 107 mmol/L    Carbon Dioxide (CO2) 20 (L) 22 - 29 mmol/L    Anion Gap 13 7 - 15 mmol/L    Urea Nitrogen 5.7 (L) 6.0 - 20.0 mg/dL    Creatinine 0.64 0.51 - 0.95 mg/dL    Calcium 9.4 8.6 - 10.0 mg/dL    Glucose 84 70 - 99 mg/dL    GFR Estimate >90 >60 mL/min/1.73m2   Result Value Ref Range    Troponin T, High Sensitivity <6 <=14 ng/L   CBC with platelets and differential   Result Value Ref Range    WBC Count 6.5 4.0 - 11.0 10e3/uL    RBC Count 4.39 3.80 - 5.20 10e6/uL    Hemoglobin 14.2 11.7 - 15.7 g/dL    Hematocrit 39.2 35.0 - 47.0 %    MCV 89 78 - 100 fL    MCH 32.3 26.5 - 33.0 pg    MCHC 36.2 31.5 - 36.5 g/dL    RDW 11.4 10.0 - 15.0 %    Platelet Count 256 150 - 450 10e3/uL    % Neutrophils 47 %    % Lymphocytes 44 %    % Monocytes 8 %    % Eosinophils 0 %    % Basophils 1 %    % Immature Granulocytes 0 %    NRBCs per 100 WBC 0 <1 /100    Absolute Neutrophils 3.0 1.6 - 8.3 10e3/uL    Absolute Lymphocytes 2.9 0.8 - 5.3 10e3/uL    Absolute Monocytes 0.5 0.0 - 1.3 10e3/uL    Absolute Eosinophils 0.0 0.0 - 0.7 10e3/uL    Absolute Basophils 0.0 0.0 - 0.2 10e3/uL    Absolute Immature Granulocytes 0.0 <=0.4 10e3/uL    Absolute NRBCs 0.0 10e3/uL       RADIOLOGY:  Reviewed all pertinent imaging. Please see official radiology report.  Chest XR,  PA & LAT   Final Result   IMPRESSION: Negative chest.          EKG:    Normal sinus rhythm.  Rate of 74.  Normal QRS.  Normal ST segments.  Unchanged compared to April 15, 2023  I have independently reviewed and interpreted the EKG(s) documented above.      I, Jaime Daley, am serving as a scribe to document services personally performed by Peterson Jimenes MD, based on my observation and the provider's statements to me. I, Peterson Jimenes MD attest that Jaime Daley is acting in a scribe capacity, has observed my performance of the services and has documented them in accordance with my direction.    Peterson  KIYA Jimenes.  Emergency Medicine  [facility] EMERGENCY DEPARTMENT       Peterson Jimenes MD  05/18/23 2038

## 2023-05-23 LAB
ATRIAL RATE - MUSE: 74 BPM
DIASTOLIC BLOOD PRESSURE - MUSE: NORMAL MMHG
INTERPRETATION ECG - MUSE: NORMAL
P AXIS - MUSE: 42 DEGREES
PR INTERVAL - MUSE: 120 MS
QRS DURATION - MUSE: 72 MS
QT - MUSE: 376 MS
QTC - MUSE: 417 MS
R AXIS - MUSE: 70 DEGREES
SYSTOLIC BLOOD PRESSURE - MUSE: NORMAL MMHG
T AXIS - MUSE: 51 DEGREES
VENTRICULAR RATE- MUSE: 74 BPM

## 2023-05-31 ENCOUNTER — VIRTUAL VISIT (OUTPATIENT)
Dept: FAMILY MEDICINE | Facility: CLINIC | Age: 28
End: 2023-05-31
Payer: COMMERCIAL

## 2023-05-31 DIAGNOSIS — F41.9 ANXIETY: Primary | ICD-10-CM

## 2023-05-31 DIAGNOSIS — Z82.49 FAMILY HISTORY OF VALVULAR HEART DISEASE: ICD-10-CM

## 2023-05-31 DIAGNOSIS — E53.8 VITAMIN B12 DEFICIENCY (NON ANEMIC): ICD-10-CM

## 2023-05-31 DIAGNOSIS — D50.9 IRON DEFICIENCY ANEMIA, UNSPECIFIED IRON DEFICIENCY ANEMIA TYPE: ICD-10-CM

## 2023-05-31 PROCEDURE — 99215 OFFICE O/P EST HI 40 MIN: CPT | Mod: 95 | Performed by: FAMILY MEDICINE

## 2023-05-31 RX ORDER — SERTRALINE HYDROCHLORIDE 25 MG/1
25 TABLET, FILM COATED ORAL DAILY
Qty: 30 TABLET | Refills: 1 | Status: SHIPPED | OUTPATIENT
Start: 2023-05-31 | End: 2024-07-03

## 2023-05-31 NOTE — PROGRESS NOTES
"Gloria is a 27 year old who is being evaluated via a billable video visit.      How would you like to obtain your AVS? Jermainet  If the video visit is dropped, the invitation should be resent by: Text to cell phone: [phone]  Will anyone else be joining your video visit? No          Assessment & Plan     Anxiety  Her anxiety symptoms have been much worse lately after being told by other family members that there is a family history of people having \"weak hearts\".  She has been on sertraline in the past, but is not currently taking this medication.  We discussed restarting it which she is open to trying again.  I sent in a prescription for 25 mg.  She may increase this to 50 mg daily after a couple of weeks.  I recommended she schedule follow-up with her PCP in 1 month or sooner if needed.  - sertraline (ZOLOFT) 25 MG tablet; Take 1 tablet (25 mg) by mouth daily    Family history of valvular heart disease  Upon further discussion, it sounds like her grandfather is having to have valvular heart surgery.  I suspect that the \"weak heart\" comment about her grandfather has to do with a valve that is not working well and has led to some component of CHF.  I tried to provide reassurance to her that cardiac work-up she has had in the emergency departments have been unremarkable.  She was recently seen in the ED on 5/18/2023 and had an unremarkable chest x-ray, EKG, normal troponin, BMP and CBC.    Iron deficiency anemia, unspecified iron deficiency anemia type  She reports having a history of iron deficiency anemia.  Her hemoglobin on 5/18/2023 was normal at 14.2.    Vitamin B12 deficiency (non anemic)  She reports not taking the vitamin B12 supplements at this time.  Last year her B12 level was in the normal range at 346.  - Vitamin B12; Future      40 minutes spent by me on the date of the encounter doing chart review, review of test results, interpretation of tests, patient visit and documentation            Nacho ROMAN" "Gale Luna DO  [facility]    Swati Castro is a 27 year old, presenting for the following health issues:  No chief complaint on file.        5/31/2023    10:31 AM   Additional Questions   Roomed by Edgar SALEH     History of Present Illness       Reason for visit:  Family history of weak heart, having heart palpitations, have vitamin b12 deficiency and iron deficiency anemia    She eats 0-1 servings of fruits and vegetables daily.She consumes 1 sweetened beverage(s) daily.She exercises with enough effort to increase her heart rate 10 to 19 minutes per day.  She exercises with enough effort to increase her heart rate 3 or less days per week. She is missing 1 dose(s) of medications per week.  She is not taking prescribed medications regularly due to remembering to take.             She reports feeling very anxious and stressed out because she recently talked to a family members who told her that there is a family history of people having \"weak hearts\".  She states that her grandfather is having to have heart surgery to fix the valve.  She also reports having a sister who had a \"hole in her heart\" as a baby.  She describes feeling very stressed out and anxious from all of this.  She occasionally feels like she gets chest pains and feels a little short of breath and has heart palpitations when she is stressed out.  She has been to the emergency department several times recently.  On 5/18/2023 her cardiac work-up including an EKG, chest x-ray, troponin, BMP and CBC were all normal/unremarkable.  She used to take sertraline, but has been off this lately.  She is open to restarting it.  She also describes having history of iron deficiency anemia and vitamin B12 deficiency.  She would like to have labs rechecked if appropriate.      Review of Systems   Constitutional, HEENT, cardiovascular, pulmonary, GI, , musculoskeletal, neuro, skin, endocrine and psych systems are negative, except as " otherwise noted.      Objective           Vitals:  No vitals were obtained today due to virtual visit.    Physical Exam   GENERAL: Healthy, alert and no distress  EYES: Eyes grossly normal to inspection.  No discharge or erythema, or obvious scleral/conjunctival abnormalities.  RESP: No audible wheeze, cough, or visible cyanosis.  No visible retractions or increased work of breathing.    SKIN: Visible skin clear. No significant rash, abnormal pigmentation or lesions.  NEURO: Cranial nerves grossly intact.  Mentation and speech appropriate for age.  PSYCH: Mentation appears normal, she appears anxious during the appointment., judgement and insight intact, normal speech and appearance well-groomed.                Video-Visit Details    Type of service:  Video Visit   Video Start Time: 12:00 PM  Video End Time:12:22 PM    Originating Location (pt. Location): Home    Distant Location (provider location):  On-site  Platform used for Video Visit: W-21

## 2023-06-06 ENCOUNTER — VIRTUAL VISIT (OUTPATIENT)
Dept: FAMILY MEDICINE | Facility: CLINIC | Age: 28
End: 2023-06-06
Payer: COMMERCIAL

## 2023-06-06 DIAGNOSIS — R20.2 PARESTHESIA: Primary | ICD-10-CM

## 2023-06-06 PROCEDURE — 99214 OFFICE O/P EST MOD 30 MIN: CPT | Mod: VID | Performed by: FAMILY MEDICINE

## 2023-06-06 RX ORDER — GABAPENTIN 100 MG/1
100 CAPSULE ORAL 3 TIMES DAILY
Qty: 20 CAPSULE | Refills: 1 | Status: SHIPPED | OUTPATIENT
Start: 2023-06-06

## 2023-06-06 NOTE — PROGRESS NOTES
Gloria is a 27 year old who is being evaluated via a billable video visit.      How would you like to obtain your AVS? MyChart  If the video visit is dropped, the invitation should be resent by: Text to cell phone: [phone]  Will anyone else be joining your video visit? No        Assessment & Plan     Paresthesia    This patient started experiencing paresthesias around her neck after doing some physical therapy exercises.  I suggest that she stop the physical therapy exercises and talk to her physical therapist about her symptoms at her next appointment.  Because her symptoms are so bothersome I have prescribed her gabapentin to take as needed up to 3 times daily.  Gabapentin can also be helpful for anxiety.    - gabapentin (NEURONTIN) 100 MG capsule; Take 1 capsule (100 mg) by mouth 3 times daily    Please chew  30 minutes spent by me on the date of the encounter doing chart review, history and exam, documentation and further activities per the note           Chelsie Casas [facility]    Swati Castro is a 27 year old, presenting for the following health issues:  Neck Pain        6/6/2023    12:58 PM   Additional Questions   Roomed by Anita         6/6/2023    12:58 PM   Patient Reported Additional Medications   Patient reports taking the following new medications None     HPI     Concern - Neck pain/ Allergies ?  Onset: Few days  Description: history of neck pain and currently seeing PT for it through [facility] [place]- had first visit on Friday. Past few days has been having stiffness and burning sensation with her neck and collar from shirts or hair being down will bother her-   Intensity: moderate, severe  Progression of Symptoms:  same  Accompanying Signs & Symptoms: Has seasonal allergies and not sure if burning neck symptoms are due to allergies or due to ongoing neck problems  Previous history of similar problem: Neck pain- yes  Precipitating factors:        Worsened by: touch,  collar on shirts, hair being down  Alleviating factors:        Improved by: none  Therapies tried and outcome: None- just allergy meds for allergy symptoms            Review of Systems   Constitutional, HEENT, cardiovascular, pulmonary, gi and gu systems are negative, except as otherwise noted.      Objective    Vitals - Patient Reported  Pain Score: Extreme Pain (8)  Pain Loc: Neck      Vitals:  No vitals were obtained today due to virtual visit.    Physical Exam   GENERAL: Healthy, alert and no distress  EYES: Eyes grossly normal to inspection.  No discharge or erythema, or obvious scleral/conjunctival abnormalities.  RESP: No audible wheeze, cough, or visible cyanosis.  No visible retractions or increased work of breathing.    SKIN: Visible skin clear. No significant rash, abnormal pigmentation or lesions.  NEURO: Cranial nerves grossly intact.  Mentation and speech appropriate for age.  PSYCH: Mentation appears normal, affect normal/bright, judgement and insight intact, normal speech and appearance well-groomed.            Video-Visit Details    Type of service:  Video Visit   Video Start Time: 1 PM  Video End Time:1:29 PM    Originating Location (pt. Location): Home    Distant Location (provider location):  Off-site  Platform used for Video Visit: Joselyn

## 2023-06-27 ENCOUNTER — LAB REQUISITION (OUTPATIENT)
Dept: LAB | Facility: CLINIC | Age: 28
End: 2023-06-27

## 2023-06-27 DIAGNOSIS — Z12.4 ENCOUNTER FOR SCREENING FOR MALIGNANT NEOPLASM OF CERVIX: ICD-10-CM

## 2023-06-27 PROCEDURE — G0145 SCR C/V CYTO,THINLAYER,RESCR: HCPCS | Performed by: PHYSICIAN ASSISTANT

## 2023-06-27 PROCEDURE — 87624 HPV HI-RISK TYP POOLED RSLT: CPT | Performed by: PHYSICIAN ASSISTANT

## 2023-07-03 LAB
BKR LAB AP GYN ADEQUACY: NORMAL
BKR LAB AP GYN INTERPRETATION: NORMAL
BKR LAB AP HPV REFLEX: NORMAL
BKR LAB AP PREVIOUS ABNORMAL: NORMAL
PATH REPORT.COMMENTS IMP SPEC: NORMAL
PATH REPORT.COMMENTS IMP SPEC: NORMAL
PATH REPORT.RELEVANT HX SPEC: NORMAL

## 2023-07-05 LAB
HUMAN PAPILLOMA VIRUS 16 DNA: NEGATIVE
HUMAN PAPILLOMA VIRUS 18 DNA: NEGATIVE
HUMAN PAPILLOMA VIRUS FINAL DIAGNOSIS: NORMAL
HUMAN PAPILLOMA VIRUS OTHER HR: NEGATIVE

## 2023-07-13 ENCOUNTER — LAB REQUISITION (OUTPATIENT)
Dept: LAB | Facility: CLINIC | Age: 28
End: 2023-07-13

## 2023-07-13 DIAGNOSIS — J02.9 ACUTE PHARYNGITIS, UNSPECIFIED: ICD-10-CM

## 2023-07-13 PROCEDURE — 87081 CULTURE SCREEN ONLY: CPT | Performed by: PHYSICIAN ASSISTANT

## 2023-07-15 LAB — BACTERIA SPEC CULT: NORMAL

## 2023-08-01 ENCOUNTER — HOSPITAL ENCOUNTER (EMERGENCY)
Facility: HOSPITAL | Age: 28
Discharge: LEFT BEFORE DISCHARGE | End: 2023-08-01
Attending: EMERGENCY MEDICINE | Admitting: EMERGENCY MEDICINE
Payer: COMMERCIAL

## 2023-08-01 ENCOUNTER — APPOINTMENT (OUTPATIENT)
Dept: MRI IMAGING | Facility: HOSPITAL | Age: 28
End: 2023-08-01
Attending: EMERGENCY MEDICINE
Payer: COMMERCIAL

## 2023-08-01 VITALS
HEART RATE: 95 BPM | TEMPERATURE: 98.8 F | SYSTOLIC BLOOD PRESSURE: 135 MMHG | DIASTOLIC BLOOD PRESSURE: 89 MMHG | OXYGEN SATURATION: 99 % | HEIGHT: 65 IN | WEIGHT: 118 LBS | BODY MASS INDEX: 19.66 KG/M2 | RESPIRATION RATE: 16 BRPM

## 2023-08-01 DIAGNOSIS — R51.9 NONINTRACTABLE HEADACHE, UNSPECIFIED CHRONICITY PATTERN, UNSPECIFIED HEADACHE TYPE: ICD-10-CM

## 2023-08-01 LAB
ALBUMIN SERPL BCG-MCNC: 4.8 G/DL (ref 3.5–5.2)
ALP SERPL-CCNC: 101 U/L (ref 35–104)
ALT SERPL W P-5'-P-CCNC: 18 U/L (ref 0–50)
ANION GAP SERPL CALCULATED.3IONS-SCNC: 12 MMOL/L (ref 7–15)
AST SERPL W P-5'-P-CCNC: 24 U/L (ref 0–45)
BASOPHILS # BLD AUTO: 0 10E3/UL (ref 0–0.2)
BASOPHILS NFR BLD AUTO: 1 %
BILIRUB SERPL-MCNC: 0.4 MG/DL
BUN SERPL-MCNC: 7.2 MG/DL (ref 6–20)
CALCIUM SERPL-MCNC: 9.9 MG/DL (ref 8.6–10)
CHLORIDE SERPL-SCNC: 104 MMOL/L (ref 98–107)
CREAT SERPL-MCNC: 0.64 MG/DL (ref 0.51–0.95)
DEPRECATED HCO3 PLAS-SCNC: 21 MMOL/L (ref 22–29)
EOSINOPHIL # BLD AUTO: 0 10E3/UL (ref 0–0.7)
EOSINOPHIL NFR BLD AUTO: 0 %
ERYTHROCYTE [DISTWIDTH] IN BLOOD BY AUTOMATED COUNT: 11.6 % (ref 10–15)
GFR SERPL CREATININE-BSD FRML MDRD: >90 ML/MIN/1.73M2
GLUCOSE SERPL-MCNC: 90 MG/DL (ref 70–99)
GROUP A STREP BY PCR: NOT DETECTED
HCG SERPL QL: NEGATIVE
HCT VFR BLD AUTO: 41.6 % (ref 35–47)
HGB BLD-MCNC: 14.3 G/DL (ref 11.7–15.7)
IMM GRANULOCYTES # BLD: 0 10E3/UL
IMM GRANULOCYTES NFR BLD: 0 %
LYMPHOCYTES # BLD AUTO: 2.4 10E3/UL (ref 0.8–5.3)
LYMPHOCYTES NFR BLD AUTO: 30 %
MCH RBC QN AUTO: 31.5 PG (ref 26.5–33)
MCHC RBC AUTO-ENTMCNC: 34.4 G/DL (ref 31.5–36.5)
MCV RBC AUTO: 92 FL (ref 78–100)
MONOCYTES # BLD AUTO: 0.6 10E3/UL (ref 0–1.3)
MONOCYTES NFR BLD AUTO: 8 %
NEUTROPHILS # BLD AUTO: 4.9 10E3/UL (ref 1.6–8.3)
NEUTROPHILS NFR BLD AUTO: 61 %
NRBC # BLD AUTO: 0 10E3/UL
NRBC BLD AUTO-RTO: 0 /100
PLATELET # BLD AUTO: 297 10E3/UL (ref 150–450)
POTASSIUM SERPL-SCNC: 3.5 MMOL/L (ref 3.4–5.3)
PROT SERPL-MCNC: 7.8 G/DL (ref 6.4–8.3)
RBC # BLD AUTO: 4.54 10E6/UL (ref 3.8–5.2)
SODIUM SERPL-SCNC: 137 MMOL/L (ref 136–145)
WBC # BLD AUTO: 8 10E3/UL (ref 4–11)

## 2023-08-01 PROCEDURE — 85025 COMPLETE CBC W/AUTO DIFF WBC: CPT | Performed by: EMERGENCY MEDICINE

## 2023-08-01 PROCEDURE — 99284 EMERGENCY DEPT VISIT MOD MDM: CPT | Mod: 25

## 2023-08-01 PROCEDURE — 70551 MRI BRAIN STEM W/O DYE: CPT

## 2023-08-01 PROCEDURE — 84703 CHORIONIC GONADOTROPIN ASSAY: CPT | Performed by: EMERGENCY MEDICINE

## 2023-08-01 PROCEDURE — 36415 COLL VENOUS BLD VENIPUNCTURE: CPT | Performed by: EMERGENCY MEDICINE

## 2023-08-01 PROCEDURE — 250N000013 HC RX MED GY IP 250 OP 250 PS 637: Performed by: EMERGENCY MEDICINE

## 2023-08-01 PROCEDURE — 87651 STREP A DNA AMP PROBE: CPT | Performed by: EMERGENCY MEDICINE

## 2023-08-01 PROCEDURE — 80053 COMPREHEN METABOLIC PANEL: CPT | Performed by: EMERGENCY MEDICINE

## 2023-08-01 RX ORDER — HYDROXYZINE HYDROCHLORIDE 10 MG/1
10 TABLET, FILM COATED ORAL ONCE
Status: COMPLETED | OUTPATIENT
Start: 2023-08-01 | End: 2023-08-01

## 2023-08-01 RX ORDER — PROCHLORPERAZINE MALEATE 10 MG
10 TABLET ORAL ONCE
Status: COMPLETED | OUTPATIENT
Start: 2023-08-01 | End: 2023-08-01

## 2023-08-01 RX ORDER — IBUPROFEN 600 MG/1
600 TABLET, FILM COATED ORAL ONCE
Status: COMPLETED | OUTPATIENT
Start: 2023-08-01 | End: 2023-08-01

## 2023-08-01 RX ADMIN — HYDROXYZINE HYDROCHLORIDE 10 MG: 10 TABLET ORAL at 20:24

## 2023-08-01 RX ADMIN — PROCHLORPERAZINE MALEATE 10 MG: 10 TABLET ORAL at 20:24

## 2023-08-01 RX ADMIN — IBUPROFEN 600 MG: 600 TABLET, FILM COATED ORAL at 20:24

## 2023-08-02 NOTE — ED TRIAGE NOTES
Patient was at work and a silent migraine was coming on with some numbness to her left arm      (chronic in nature) her tongue  also is somewhat numb. BE FAST negative. Patient very anxious in triage.      Triage Assessment       Row Name 08/01/23 1927       Triage Assessment (Adult)    Airway WDL WDL       Respiratory WDL    Respiratory WDL WDL       Skin Circulation/Temperature WDL    Skin Circulation/Temperature WDL WDL       Cardiac WDL    Cardiac WDL WDL       Peripheral/Neurovascular WDL    Peripheral Neurovascular WDL WDL       Cognitive/Neuro/Behavioral WDL    Cognitive/Neuro/Behavioral WDL WDL

## 2023-08-02 NOTE — ED PROVIDER NOTES
EMERGENCY DEPARTMENT ENCOUNTER      NAME: Renata Staton  AGE: 27 year old female  YOB: 1995  MRN: [id number]  EVALUATION DATE & TIME: No admission date for patient encounter.    PCP: Andria Hernández    ED PROVIDER: Gale Estrada M.D.      Chief Complaint   Patient presents with    Headache         FINAL IMPRESSION:  1. Nonintractable headache, unspecified chronicity pattern, unspecified headache type          ED COURSE & MEDICAL DECISION MAKING:    ED Course as of 08/01/23 2142 Tue Aug 01, 2023   1934 Patient with paresthesias to tongue and left arm with NIHSS 0 with h/o recurrent headaches, given compazine/ibuprofen for headache, not sure if pregnant, with dry cough and tobacco abuse pending CXR and strep test with son sick with strep, plan to check hcg as she is not sure if pregnant or not, and reassess for HA improvement, MRI r/o unlikely CVA   2141 Patient left AMA prior to imaging without signing paperwork or letting me know she was leaving       Pertinent Labs & Imaging studies reviewed. (See chart for details)    N95 worn  A face shield was worn also  COVID PPE    Medical Decision Making    History:  Supplemental history from: Documented in chart, if applicable  External Record(s) reviewed: Documented in chart, if applicable.    Work Up:  Chart documentation includes differential considered and any EKGs or imaging independently interpreted by provider, where specified.  In additional to work up documented, I considered the following work up: Documented in chart, if applicable.    External consultation:  Discussion of management with another provider: Documented in chart, if applicable    Complicating factors:  Care impacted by chronic illness: N/A  Care affected by social determinants of health: N/A    Disposition considerations:  Patient left AMA prior to imaging and completion of ED testing without signing AMA paperwork.        At the conclusion of the encounter I discussed the results  of all of the tests and the disposition. The questions were answered. The patient or family acknowledged understanding and was agreeable with the care plan.     MEDICATIONS GIVEN IN THE EMERGENCY:  Medications   hydrOXYzine (ATARAX) tablet 10 mg (10 mg Oral $Given 8/1/23 2024)   ibuprofen (ADVIL/MOTRIN) tablet 600 mg (600 mg Oral $Given 8/1/23 2024)   prochlorperazine (COMPAZINE) tablet 10 mg (10 mg Oral $Given 8/1/23 2024)       NEW PRESCRIPTIONS STARTED AT TODAY'S ER VISIT  New Prescriptions    No medications on file          =================================================================    HPI      Renata Staton is a 27 year old female with PMHx of chronic anemia, tobacco abuse, and migraine and tension headaches who presents to the ED today via private vehicle with headache.    She reports she gets ocular migraines, today at work she had a gradual onset headache similar to prior migraine headaches with arms and tongue tingling bilaterally. Today she had gradual onset headache since 1800 to left side of head achy 5/10, she took midol without improvement. She has mild nausea. This is similar to past migraine. No rash, no fever, no confusion, no neck pain or trauma or fall.     She has mild dry cough since weekend with son sick with strep.      REVIEW OF SYSTEMS   All other systems reviewed and are negative except as noted above in HPI.    PAST MEDICAL HISTORY:  Past Medical History:   Diagnosis Date    Anxiety     Chronic abdominal pain     Chronic pelvic pain in female     Depression     Depressive disorder 2017    History of anesthesia complications     family issues slow to wake up after surgery    Migraine        PAST SURGICAL HISTORY:  Past Surgical History:   Procedure Laterality Date    ABDOMEN SURGERY      COLONOSCOPY  10/28    ENDOSCOPIC RETROGRADE CHOLANGIOPANCREATOGRAM N/A 12/18/2019    Procedure: ENDOSCOPIC RETROGRADE CHOLANGIOPANCREATOGRAPHY with billary sphinctomy and stone extraction and   billary stent placement with epinephrine injection ;  Surgeon: Dustin Betancourt MD;  Location[facility];  Service: Gastroenterology    ENDOSCOPIC RETROGRADE CHOLANGIOPANCREATOGRAM N/A 02/28/2020    Procedure: ENDOSCOPIC RETROGRADE CHOLANGIOPANCREATOGRAPHY, STENT REMOVAL, SLUDGE REMOVAL;  Surgeon: Dustin Betancourt MD;  Location: [facility];  Service: Gastroenterology    LAPAROSCOPIC CHOLECYSTECTOMY N/A 12/11/2019    Procedure: CHOLECYSTECTOMY, LAPAROSCOPIC;  Surgeon: Samia Portillo MD;  Location: [facility] OR;  Service: General    KS LAP,DIAGNOSTIC ABDOMEN N/A 02/20/2018    Procedure: DIAGNOSTIC LAPAROSCOPY, LYSIS OF  ADHESIONS;  Surgeon: Manish Lyons MD;  Location: [facility];  Service: Gynecology    XR ERCP BILIARY ONLY  12/18/2019    XR ERCP BILIARY ONLY  02/28/2020       CURRENT MEDICATIONS:    albuterol (PROAIR HFA/PROVENTIL HFA/VENTOLIN HFA) 108 (90 Base) MCG/ACT inhaler  cholecalciferol 50 MCG (2000 UT) CAPS  cyanocobalamin (VITAMIN B-12) 1000 MCG tablet  dexamethasone (DECADRON) 4 MG tablet  dimenhyDRINATE (DRAMAMINE) 50 MG CHEW  doxycycline hyclate (VIBRAMYCIN) 100 MG capsule  gabapentin (NEURONTIN) 100 MG capsule  hydrOXYzine (VISTARIL) 25 MG capsule  levonorgestrel-ethinyl estradiol (SEASONALE) 0.15-0.03 MG tablet  ondansetron (ZOFRAN ODT) 4 MG ODT tab  pseudoePHEDrine (SUDAFED) 30 MG tablet  sertraline (ZOLOFT) 25 MG tablet  sodium chloride (OCEAN) 0.65 % nasal spray        ALLERGIES:  Allergies   Allergen Reactions    Penicillin G Anaphylaxis    Sumatriptan Hives, Swelling and Anxiety     Went to ED after taking 25mg for first time, no objective findings, likely not true allergy    Amoxicillin-Pot Clavulanate Hives    Iohexol Hives and Itching     Pt had hives after premedication.  She had a prior reaction in 2020 at the ER given Iohexol and today was given Iohexol again with premedication of Prednistone 50 mg 13, 7, 1 hour prior.  Pt developed hives on  "head and abdomen.  Stable vitals HR 95, BP 90/57, O2 SAT 99%.  Pt given water and time and symptoms improved.  No breathing or respiratory issues.   Pt check by RN from primary care clinic.  Benedict    Lansoprazole Hives    Peanut Oil Unknown    Peanut [Peanut Oil] Swelling     Throat swelling       FAMILY HISTORY:  Family History   Problem Relation Age of Onset    Cancer Mother     Asthma Mother     Asthma Sister     Mental Illness Sister     Heart Disease Sister     Valvular heart disease Paternal Grandfather        SOCIAL HISTORY:   Social History     Socioeconomic History    Marital status: Single   Tobacco Use    Smoking status: Every Day     Packs/day: 0.25     Years: 7.00     Pack years: 1.75     Types: Cigarettes     Passive exposure: Never    Smokeless tobacco: Never    Tobacco comments:     7-8 cpd.   Vaping Use    Vaping Use: Never used   Substance and Sexual Activity    Alcohol use: No     Comment: Alcoholic Drinks/day: rare    Drug use: Never    Sexual activity: Yes     Partners: Male     Birth control/protection: Condom   Other Topics Concern    Parent/sibling w/ CABG, MI or angioplasty before 65F 55M? No       VITALS:  Patient Vitals for the past 24 hrs:   BP Temp Temp src Pulse Resp SpO2 Height Weight   08/01/23 1926 135/89 98.8  F (37.1  C) Temporal 95 16 99 % 1.651 m (5' 5\") 53.5 kg (118 lb)       PHYSICAL EXAM    GENERAL: Awake, alert.  In no acute distress.   HEENT: Normocephalic, atraumatic.  Pupils equal, round and reactive.  Conjunctiva normal.  EOMI.  NECK: No stridor or apparent deformity.  PULMONARY: Symmetrical breath sounds without distress.  Lungs clear to auscultation bilaterally without wheezes, rhonchi or rales.  CARDIO: Regular rate and rhythm.  No significant murmur, rub or gallop.  Radial pulses strong and symmetrical.  ABDOMINAL: Abdomen soft, non-distended and non-tender to palpation.  No CVAT, no palpable hepatosplenomegaly.  EXTREMITIES: No lower extremity swelling or edema.  "   NEURO: Alert and oriented to person, place and time.  Cranial nerves grossly intact.  No focal motor deficit. Left arm atypical sensation but able to feel it and move it, NIHSS 0  PSYCH: Anxious affect  SKIN: No rashes      LAB:  All pertinent labs reviewed and interpreted.  Results for orders placed or performed during the hospital encounter of 08/01/23   MR Brain w/o Contrast    Impression    IMPRESSION:  1.  Normal head MRI.   Comprehensive metabolic panel   Result Value Ref Range    Sodium 137 136 - 145 mmol/L    Potassium 3.5 3.4 - 5.3 mmol/L    Chloride 104 98 - 107 mmol/L    Carbon Dioxide (CO2) 21 (L) 22 - 29 mmol/L    Anion Gap 12 7 - 15 mmol/L    Urea Nitrogen 7.2 6.0 - 20.0 mg/dL    Creatinine 0.64 0.51 - 0.95 mg/dL    Calcium 9.9 8.6 - 10.0 mg/dL    Glucose 90 70 - 99 mg/dL    Alkaline Phosphatase 101 35 - 104 U/L    AST 24 0 - 45 U/L    ALT 18 0 - 50 U/L    Protein Total 7.8 6.4 - 8.3 g/dL    Albumin 4.8 3.5 - 5.2 g/dL    Bilirubin Total 0.4 <=1.2 mg/dL    GFR Estimate >90 >60 mL/min/1.73m2   HCG qualitative Blood   Result Value Ref Range    hCG Serum Qualitative Negative Negative   CBC with platelets and differential   Result Value Ref Range    WBC Count 8.0 4.0 - 11.0 10e3/uL    RBC Count 4.54 3.80 - 5.20 10e6/uL    Hemoglobin 14.3 11.7 - 15.7 g/dL    Hematocrit 41.6 35.0 - 47.0 %    MCV 92 78 - 100 fL    MCH 31.5 26.5 - 33.0 pg    MCHC 34.4 31.5 - 36.5 g/dL    RDW 11.6 10.0 - 15.0 %    Platelet Count 297 150 - 450 10e3/uL    % Neutrophils 61 %    % Lymphocytes 30 %    % Monocytes 8 %    % Eosinophils 0 %    % Basophils 1 %    % Immature Granulocytes 0 %    NRBCs per 100 WBC 0 <1 /100    Absolute Neutrophils 4.9 1.6 - 8.3 10e3/uL    Absolute Lymphocytes 2.4 0.8 - 5.3 10e3/uL    Absolute Monocytes 0.6 0.0 - 1.3 10e3/uL    Absolute Eosinophils 0.0 0.0 - 0.7 10e3/uL    Absolute Basophils 0.0 0.0 - 0.2 10e3/uL    Absolute Immature Granulocytes 0.0 <=0.4 10e3/uL    Absolute NRBCs 0.0 10e3/uL        RADIOLOGY:  Reviewed all pertinent imaging. Please see official radiology report.  MR Brain w/o Contrast   Final Result   IMPRESSION:   1.  Normal head MRI.      XR Chest 2 Views    (Results Pending)          Gale Estrada MD  08/01/23 [id number]

## 2023-08-28 ENCOUNTER — HOSPITAL ENCOUNTER (EMERGENCY)
Facility: HOSPITAL | Age: 28
Discharge: HOME | End: 2023-08-28
Payer: COMMERCIAL

## 2023-08-28 ENCOUNTER — APPOINTMENT (OUTPATIENT)
Dept: RADIOLOGY | Facility: HOSPITAL | Age: 28
End: 2023-08-28
Payer: COMMERCIAL

## 2023-08-28 VITALS
DIASTOLIC BLOOD PRESSURE: 68 MMHG | OXYGEN SATURATION: 99 % | SYSTOLIC BLOOD PRESSURE: 98 MMHG | RESPIRATION RATE: 16 BRPM | TEMPERATURE: 98.4 F | BODY MASS INDEX: 19.66 KG/M2 | HEART RATE: 71 BPM | WEIGHT: 118 LBS | HEIGHT: 65 IN

## 2023-08-28 DIAGNOSIS — J32.9 SINUSITIS: ICD-10-CM

## 2023-08-28 DIAGNOSIS — F41.0 PANIC ATTACK: ICD-10-CM

## 2023-08-28 LAB
ANION GAP SERPL CALCULATED.3IONS-SCNC: 10 MMOL/L (ref 7–15)
BUN SERPL-MCNC: 6.5 MG/DL (ref 6–20)
CALCIUM SERPL-MCNC: 9.3 MG/DL (ref 8.6–10)
CHLORIDE SERPL-SCNC: 106 MMOL/L (ref 98–107)
CREAT SERPL-MCNC: 0.63 MG/DL (ref 0.51–0.95)
D DIMER PPP FEU-MCNC: 0.32 UG/ML FEU (ref 0–0.5)
DEPRECATED HCO3 PLAS-SCNC: 23 MMOL/L (ref 22–29)
ERYTHROCYTE [DISTWIDTH] IN BLOOD BY AUTOMATED COUNT: 11.9 % (ref 10–15)
FLUAV RNA SPEC QL NAA+PROBE: NEGATIVE
FLUBV RNA RESP QL NAA+PROBE: NEGATIVE
GFR SERPL CREATININE-BSD FRML MDRD: >90 ML/MIN/1.73M2
GLUCOSE SERPL-MCNC: 87 MG/DL (ref 70–99)
HCT VFR BLD AUTO: 43.6 % (ref 35–47)
HGB BLD-MCNC: 14.9 G/DL (ref 11.7–15.7)
MAGNESIUM SERPL-MCNC: 2.1 MG/DL (ref 1.7–2.3)
MCH RBC QN AUTO: 31.7 PG (ref 26.5–33)
MCHC RBC AUTO-ENTMCNC: 34.2 G/DL (ref 31.5–36.5)
MCV RBC AUTO: 93 FL (ref 78–100)
PLATELET # BLD AUTO: 248 10E3/UL (ref 150–450)
POTASSIUM SERPL-SCNC: 4 MMOL/L (ref 3.4–5.3)
RBC # BLD AUTO: 4.7 10E6/UL (ref 3.8–5.2)
RSV RNA SPEC NAA+PROBE: NEGATIVE
SARS-COV-2 RNA RESP QL NAA+PROBE: NEGATIVE
SODIUM SERPL-SCNC: 139 MMOL/L (ref 136–145)
TROPONIN T SERPL HS-MCNC: <6 NG/L
TSH SERPL DL<=0.005 MIU/L-ACNC: 1.36 UIU/ML (ref 0.3–4.2)
WBC # BLD AUTO: 8.6 10E3/UL (ref 4–11)

## 2023-08-28 PROCEDURE — 71046 X-RAY EXAM CHEST 2 VIEWS: CPT

## 2023-08-28 PROCEDURE — 84443 ASSAY THYROID STIM HORMONE: CPT

## 2023-08-28 PROCEDURE — 87637 SARSCOV2&INF A&B&RSV AMP PRB: CPT

## 2023-08-28 PROCEDURE — 85379 FIBRIN DEGRADATION QUANT: CPT

## 2023-08-28 PROCEDURE — 85027 COMPLETE CBC AUTOMATED: CPT

## 2023-08-28 PROCEDURE — 82310 ASSAY OF CALCIUM: CPT

## 2023-08-28 PROCEDURE — 99285 EMERGENCY DEPT VISIT HI MDM: CPT | Mod: 25

## 2023-08-28 PROCEDURE — 36415 COLL VENOUS BLD VENIPUNCTURE: CPT

## 2023-08-28 PROCEDURE — 84484 ASSAY OF TROPONIN QUANT: CPT

## 2023-08-28 PROCEDURE — 83735 ASSAY OF MAGNESIUM: CPT

## 2023-08-28 PROCEDURE — 93005 ELECTROCARDIOGRAM TRACING: CPT

## 2023-08-28 RX ORDER — CEFIXIME 400 MG/1
400 CAPSULE ORAL DAILY
Qty: 7 CAPSULE | Refills: 0 | Status: SHIPPED | OUTPATIENT
Start: 2023-08-28 | End: 2023-09-04

## 2023-08-28 RX ORDER — CLINDAMYCIN HCL 300 MG
300 CAPSULE ORAL 3 TIMES DAILY
Qty: 21 CAPSULE | Refills: 0 | Status: SHIPPED | OUTPATIENT
Start: 2023-08-28 | End: 2023-09-04

## 2023-08-28 RX ORDER — ECHINACEA PURPUREA EXTRACT 125 MG
TABLET ORAL
Qty: 60 ML | Refills: 0 | Status: SHIPPED | OUTPATIENT
Start: 2023-08-28

## 2023-08-28 RX ORDER — HYDROXYZINE HYDROCHLORIDE 25 MG/1
25 TABLET, FILM COATED ORAL 3 TIMES DAILY PRN
Qty: 20 TABLET | Refills: 0 | Status: SHIPPED | OUTPATIENT
Start: 2023-08-28

## 2023-08-28 ASSESSMENT — ENCOUNTER SYMPTOMS
SINUS PAIN: 1
CHEST TIGHTNESS: 1
COUGH: 1
CONSTIPATION: 0
DIARRHEA: 0
ABDOMINAL PAIN: 0
SINUS PRESSURE: 1
FEVER: 0
DYSURIA: 0
VOMITING: 0
CHILLS: 0
TROUBLE SWALLOWING: 0
SHORTNESS OF BREATH: 1
DIZZINESS: 0
SORE THROAT: 1
NAUSEA: 0

## 2023-08-28 ASSESSMENT — ACTIVITIES OF DAILY LIVING (ADL): ADLS_ACUITY_SCORE: 35

## 2023-08-28 NOTE — ED NOTES
The pt reports that she has had 3 panic attacks related to smells this past week, she feels she has so much congestion and a a sinus infection that she is feeling like she struggles to breath.

## 2023-08-28 NOTE — DISCHARGE INSTRUCTIONS
You were seen in the ER for ongoing nasal congestion along with chest pain and shortness of breath.  Your blood tests look good today and your marker for a blood clot was normal which means your shortness of breath is not caused by a blood clot in your lungs.  You are negative for COVID or influenza.  Your chest x-ray is normal.  We will start you on a different antibiotic for your symptoms.  Please take cefixime once a day for 7 days along with the other antibiotic clindamycin 3 times a day for 7 days.  Try using the saline nasal spray once a day into both nostrils otherwise, please buy over-the-counter Flonase nasal spray if you feel you would able to tolerate this.  Follow-up with your primary care provider for ongoing management of your anxiety and panic symptoms and return to the ER if you have worsening chest pain, shortness of breath, fever or any other concerning symptoms.

## 2023-08-28 NOTE — ED PROVIDER NOTES
EMERGENCY DEPARTMENT ENCOUNTER      NAME: Renata Staton  AGE: 27 year old female  YOB: 1995  MRN: [id number]  EVALUATION DATE & TIME: 8/28/2023  9:12 AM    PCP: Andria Hernández    ED PROVIDER: Ximena Mesa PA-C    Chief Complaint   Patient presents with    Panic Attack     FINAL IMPRESSION:  1. Sinusitis    2. Panic attack      MEDICAL DECISION MAKING:    Pertinent Labs & Imaging studies reviewed. (See chart for details)  Renata Staton is a 27 year old female who presents for evaluation of 4 day history of chest pain, shortness of breath and panic attack symptoms and 2 weeks of sinus congestion and facial pain.  Recently treated for acute sinusitis with doxycycline, however patient denies improvement of symptoms.  No history of cardiac disease or PE.  Takes sertraline and hydroxyzine for anxiety and depression..     On my initial evaluation, vital signs normal. On physical exam patient is awake, alert, no acute distress, resting in chair.  She is mildly uncomfortable appearing, but is not ill or toxic.  Heart sounds are normal, lungs are clear without wheezing or crackles.  Bilateral TMs are clear without erythema or effusion.  Oropharynx is mildly erythematous, but without tonsillar enlargement or exudate.  Uvula is midline.  No difficulty tolerating secretions or change to voice.  Tender palpation to frontal and maxillary sinuses..    Differential diagnosis includes COVID, influenza, other viral URI, sinusitis, ACS, PE, anxiety disorder, panic disorder, arrhythmia, pneumonia, pleural effusion, pneumothorax. Emergency department workup included basic labs in addition to D-dimer, troponin, TSH, chest x-ray, EKG, COVID and influenza swabs.     Suspect symptoms due to ongoing bacterial sinusitis has nasal congestion greater than 2 weeks.  Was previously treated with doxycycline without improvement, will start on cefixime and clindamycin.  Did obtain blood work and chest x-ray due to  chest pain and shortness of breath.  Labs and imaging independently interpreted by me.  EKG without ST elevation, ST depression or T wave inversion.  Troponin normal.  D-dimer is normal, low suspicion for ACS or PE.  COVID and influenza are negative.  Chest x-ray without pneumonia, pleural effusion or pneumothorax.  Otherwise reassuring exam and work-up today.  Patient is clinically well-appearing and vitally stable.  Plan for outpatient follow-up of her anxiety and depression.  We will refill her hydroxyzine.  Low suspicion for any emergent process that require further intervention or hospital admission.  Provided expectant management as well as strict return precautions.    Patient has had serial examinations and notes significant improvement.     Patient was discharged in stable condition with treatment plan as below. Instructed to follow up with primary care provider in 3 days and return to the emergency department with any new or worsening of symptoms. Patient expressed understanding, feels comfortable, and is in agreement with this plan. All questions addressed prior to discharge.    Medical Decision Making    History:  Supplemental history from: Documented in chart, if applicable  External Record(s) reviewed: Documented in chart, if applicable.    Work Up:  Chart documentation includes differential considered and any EKGs or imaging independently interpreted by provider, where specified.  In additional to work up documented, I considered the following work up: Documented in chart, if applicable.    External consultation:  Discussion of management with another provider: Documented in chart, if applicable    Complicating factors:  Care impacted by chronic illness: N/A  Care affected by social determinants of health: N/A    Disposition considerations: Discharge. I prescribed additional prescription strength medication(s) as charted. N/A.    ED COURSE:  9:25 AM  I reviewed the patient's chart. I met with the  patient to gather history and to perform my initial exam.   11:28 AM I rechecked the patient and updated her on results. We discussed plan for discharge including treatment plan, follow-up and return precautions to emergency department.  Patient voiced understanding and in agreement with this plan.    At the conclusion of the encounter I discussed the results of all of the tests and the disposition. The questions were answered. The patient or family acknowledged understanding and was agreeable with the care plan.     Voice recognition software was used in the creation of this note. Any grammatical or nonsensical errors are due to inherent errors with the software and are not the intention of the writer.     MEDICATIONS GIVEN IN THE EMERGENCY:  Medications - No data to display    NEW PRESCRIPTIONS STARTED AT TODAY'S ER VISIT  New Prescriptions    CEFIXIME (SUPRAX) 400 MG CAPSULE    Take 1 capsule (400 mg) by mouth daily for 7 days    CLINDAMYCIN (CLEOCIN) 300 MG CAPSULE    Take 1 capsule (300 mg) by mouth 3 times daily for 7 days    HYDROXYZINE (ATARAX) 25 MG TABLET    Take 1 tablet (25 mg) by mouth 3 times daily as needed for itching    SODIUM CHLORIDE (OCEAN) 0.65 % NASAL SPRAY    Use once a day to both nostrils to help with nasal congestion     =================================================================    HPI:    Patient information was obtained from: patient     Use of Interpretor: N/A      Renata Staton is a 27 year old female with a pertinent history of depression, anxiety, anemia, B12 deficiency who presents to this ED for evaluation of chest pain and panic attacks.    Patient reports since Thursday (4 days ago), has had worsening panic attack symptoms.  Symptoms include chest pain and shortness of breath along with anxiety.  Reports she was recently treated for sinus infection 2 weeks ago with doxycycline and finished this course last Monday.  She feels her sinus symptoms such as sinus congestion  and facial pressure are still present.  She also has a sore throat and cough.  She feels her chest pain and shortness of breath are worsened when she has coughing attacks.  Her boyfriend is also sick with similar symptoms and has been using Flonase nasal spray.  Reports she starts having panic attacks when she starts smelling the Flonase that her boyfriend uses.  Has a history of B12 deficiency and anemia and gets iron infusions every few months.  Takes sertraline and hydroxyzine for her depression and anxiety, however recently ran out of her hydroxyzine.  She follows with her PCP for management of her depression and anxiety, does not go to therapy or have a psychiatrist.  Denies fever, chills, dizziness, abdominal pain, nausea, vomiting, diarrhea, constipation, urinary symptoms or other symptoms.  Did test herself last evening for COVID which was negative.    REVIEW OF SYSTEMS:  Review of Systems   Constitutional:  Negative for chills and fever.   HENT:  Positive for congestion, sinus pressure, sinus pain and sore throat. Negative for trouble swallowing.    Respiratory:  Positive for cough, chest tightness and shortness of breath.    Cardiovascular:  Positive for chest pain. Negative for leg swelling.   Gastrointestinal:  Negative for abdominal pain, constipation, diarrhea, nausea and vomiting.   Genitourinary:  Negative for dysuria.   Neurological:  Negative for dizziness.   All other systems reviewed and are negative.       PAST MEDICAL HISTORY:  Past Medical History:   Diagnosis Date    Anxiety     Chronic abdominal pain     Chronic pelvic pain in female     Depression     Depressive disorder 2017    History of anesthesia complications     family issues slow to wake up after surgery    Migraine        PAST SURGICAL HISTORY:  Past Surgical History:   Procedure Laterality Date    ABDOMEN SURGERY      COLONOSCOPY  10/28    ENDOSCOPIC RETROGRADE CHOLANGIOPANCREATOGRAM N/A 12/18/2019    Procedure: ENDOSCOPIC  RETROGRADE CHOLANGIOPANCREATOGRAPHY with billary sphinctomy and stone extraction and  billary stent placement with epinephrine injection ;  Surgeon: Dustin Betancourt MD;  Location: [facility] OR;  Service: Gastroenterology    ENDOSCOPIC RETROGRADE CHOLANGIOPANCREATOGRAM N/A 02/28/2020    Procedure: ENDOSCOPIC RETROGRADE CHOLANGIOPANCREATOGRAPHY, STENT REMOVAL, SLUDGE REMOVAL;  Surgeon: Dustin Betancourt MD;  Location: [facility] OR;  Service: Gastroenterology    LAPAROSCOPIC CHOLECYSTECTOMY N/A 12/11/2019    Procedure: CHOLECYSTECTOMY, LAPAROSCOPIC;  Surgeon: Samia Portillo MD;  Location: [facility] OR;  Service: General    RI LAP,DIAGNOSTIC ABDOMEN N/A 02/20/2018    Procedure: DIAGNOSTIC LAPAROSCOPY, LYSIS OF  ADHESIONS;  Surgeon: Manish Lyons MD;  Location: [facility] OR;  Service: Gynecology    XR ERCP BILIARY ONLY  12/18/2019    XR ERCP BILIARY ONLY  02/28/2020       CURRENT MEDICATIONS:    No current facility-administered medications for this encounter.    Current Outpatient Medications:     cefixime (SUPRAX) 400 MG capsule, Take 1 capsule (400 mg) by mouth daily for 7 days, Disp: 7 capsule, Rfl: 0    clindamycin (CLEOCIN) 300 MG capsule, Take 1 capsule (300 mg) by mouth 3 times daily for 7 days, Disp: 21 capsule, Rfl: 0    hydrOXYzine (ATARAX) 25 MG tablet, Take 1 tablet (25 mg) by mouth 3 times daily as needed for itching, Disp: 20 tablet, Rfl: 0    sodium chloride (OCEAN) 0.65 % nasal spray, Use once a day to both nostrils to help with nasal congestion, Disp: 60 mL, Rfl: 0    albuterol (PROAIR HFA/PROVENTIL HFA/VENTOLIN HFA) 108 (90 Base) MCG/ACT inhaler, Inhale 2 puffs into the lungs every 6 hours as needed for shortness of breath, wheezing or cough, Disp: 18 g, Rfl: 0    cholecalciferol 50 MCG (2000 UT) CAPS, Take 2,000 Units by mouth daily (Patient not taking: Reported on 5/31/2023), Disp: , Rfl:     cyanocobalamin (VITAMIN B-12) 1000 MCG tablet, Take 1,000 mcg by mouth  daily (Patient not taking: Reported on 5/10/2023), Disp: , Rfl:     dexamethasone (DECADRON) 4 MG tablet, Take 1 tablet (4 mg) by mouth once for 1 dose, Disp: 1 tablet, Rfl: 0    dimenhyDRINATE (DRAMAMINE) 50 MG CHEW, Take 1 tablet (50 mg) by mouth every 6 hours as needed for other (nausea), Disp: 10 tablet, Rfl: 0    doxycycline hyclate (VIBRAMYCIN) 100 MG capsule, Take 1 capsule (100 mg) by mouth 2 times daily (Patient not taking: Reported on 5/31/2023), Disp: 14 capsule, Rfl: 0    gabapentin (NEURONTIN) 100 MG capsule, Take 1 capsule (100 mg) by mouth 3 times daily, Disp: 20 capsule, Rfl: 1    hydrOXYzine (VISTARIL) 25 MG capsule, Take 1 capsule (25 mg) by mouth 3 times daily as needed for anxiety, Disp: 20 capsule, Rfl: 0    levonorgestrel-ethinyl estradiol (SEASONALE) 0.15-0.03 MG tablet, Take 1 tablet by mouth daily (Patient not taking: Reported on 5/10/2023), Disp: 84 tablet, Rfl: 1    ondansetron (ZOFRAN ODT) 4 MG ODT tab, Take 1 tablet (4 mg) by mouth every 8 hours as needed for nausea or vomiting, Disp: 10 tablet, Rfl: 0    pseudoePHEDrine (SUDAFED) 30 MG tablet, Take 1 tablet (30 mg) by mouth every 4 hours as needed for congestion (Patient not taking: Reported on 2/8/2023), Disp: 20 tablet, Rfl: 0    sertraline (ZOLOFT) 25 MG tablet, Take 1 tablet (25 mg) by mouth daily, Disp: 30 tablet, Rfl: 1    sodium chloride (OCEAN) 0.65 % nasal spray, As needed for nasal congestion., Disp: 30 mL, Rfl: 0    ALLERGIES:  Allergies   Allergen Reactions    Penicillin G Anaphylaxis    Sumatriptan Hives, Swelling and Anxiety     Went to ED after taking 25mg for first time, no objective findings, likely not true allergy    Amoxicillin-Pot Clavulanate Hives    Iohexol Hives and Itching     Pt had hives after premedication.  She had a prior reaction in 2020 at the ER given Iohexol and today was given Iohexol again with premedication of Prednistone 50 mg 13, 7, 1 hour prior.  Pt developed hives on head and abdomen.  Stable  "vitals HR 95, BP 90/57, O2 SAT 99%.  Pt given water and time and symptoms improved.  No breathing or respiratory issues.   Pt check by RN from primary care clinic.  Benedict    Lansoprazole Hives    Peanut Oil Unknown    Peanut [Peanut Oil] Swelling     Throat swelling       FAMILY HISTORY:  Family History   Problem Relation Age of Onset    Cancer Mother     Asthma Mother     Asthma Sister     Mental Illness Sister     Heart Disease Sister     Valvular heart disease Paternal Grandfather        SOCIAL HISTORY:   Social History     Socioeconomic History    Marital status: Single   Tobacco Use    Smoking status: Every Day     Packs/day: 0.25     Years: 7.00     Pack years: 1.75     Types: Cigarettes     Passive exposure: Never    Smokeless tobacco: Never    Tobacco comments:     7-8 cpd.   Vaping Use    Vaping Use: Never used   Substance and Sexual Activity    Alcohol use: No     Comment: Alcoholic Drinks/day: rare    Drug use: Never    Sexual activity: Yes     Partners: Male     Birth control/protection: Condom   Other Topics Concern    Parent/sibling w/ CABG, MI or angioplasty before 65F 55M? No       VITALS:  Patient Vitals for the past 24 hrs:   BP Temp Pulse Resp SpO2 Height Weight   08/28/23 1000 98/68 -- 71 -- 99 % -- --   08/28/23 0915 116/65 -- 88 -- 99 % -- --   08/28/23 0909 103/67 98.4  F (36.9  C) 99 16 98 % 1.651 m (5' 5\") 53.5 kg (118 lb)       PHYSICAL EXAM    Constitutional: Well developed, Well nourished, NAD, mildly uncomfortable appearing, but not ill or toxic  HENT: Normocephalic, Atraumatic, Bilateral external ears normal, Bilateral TMs are clear without erythema or effusion. Oropharynx is mildly erythematous, but without tonsillar enlargement or exudate. Uvula is midline. No difficulty tolerating secretions or change to voice. mucous membranes moist, Nose normal. Frontal and maxillary sinus tenderness on palpation.  Neck: Normal range of motion, No tenderness, Supple, No stridor.  Eyes: PERRL, EOMI, " Conjunctiva normal, No discharge.   Respiratory: Normal breath sounds, No respiratory distress, No wheezing, Speaks full sentences easily. No cough.  Cardiovascular: Normal heart rate, Regular rhythm, No murmurs, No rubs, No gallops. Chest wall nontender.  GI: Soft, No tenderness, No masses, No flank tenderness. No rebound or guarding.  Musculoskeletal: 2+ DP pulses. No edema. No cyanosis, No clubbing. Good range of motion in all major joints. No tenderness to palpation or major deformities noted. No tenderness of the CTLS spine.   Integument: Warm, Dry, No erythema, No rash. No petechiae.  Neurologic: Alert & oriented x 3, Normal motor function, Normal sensory function, No focal deficits noted. Normal gait.  Psychiatric: Affect normal, Judgment normal, Mood normal. Cooperative.    LAB:  All pertinent labs reviewed and interpreted.  Labs Ordered and Resulted from Time of ED Arrival to Time of ED Departure   CBC WITH PLATELETS - Normal       Result Value    WBC Count 8.6      RBC Count 4.70      Hemoglobin 14.9      Hematocrit 43.6      MCV 93      MCH 31.7      MCHC 34.2      RDW 11.9      Platelet Count 248     BASIC METABOLIC PANEL - Normal    Sodium 139      Potassium 4.0      Chloride 106      Carbon Dioxide (CO2) 23      Anion Gap 10      Urea Nitrogen 6.5      Creatinine 0.63      Calcium 9.3      Glucose 87      GFR Estimate >90     MAGNESIUM - Normal    Magnesium 2.1     TROPONIN T, HIGH SENSITIVITY - Normal    Troponin T, High Sensitivity <6     D DIMER QUANTITATIVE - Normal    D-Dimer Quantitative 0.32     TSH WITH FREE T4 REFLEX - Normal    TSH 1.36     INFLUENZA A/B, RSV, & SARS-COV2 PCR - Normal    Influenza A PCR Negative      Influenza B PCR Negative      RSV PCR Negative      SARS CoV2 PCR Negative         RADIOLOGY:  Reviewed all pertinent imaging. Please see official radiology report.  Chest XR,  PA & LAT   Final Result   IMPRESSION: Negative chest.          EKG:    Performed at: 0949  Rate: 83  bpm  Impression: Sinus rhythm, no T wave inversion, ST elevation or ST depression.    I have reviewed and interpreted the EKG(s) documented above along with Dr. Phelps.    PROCEDURES:   None     Diagnosis:  1. Sinusitis    2. Panic attack        Ximena Mesa PA-C  Emergency Medicine  [facility]  8/28/2023       Ximena Mesa PA-C  08/28/23 1143

## 2023-08-28 NOTE — ED TRIAGE NOTES
"Pt reports 'panic attacks\" since Thursday where she has chest heaviness and sob. She also reports nasal congestion / cough. Tx for sinus infection last week.        "

## 2023-09-01 LAB
ATRIAL RATE - MUSE: 83 BPM
DIASTOLIC BLOOD PRESSURE - MUSE: NORMAL MMHG
INTERPRETATION ECG - MUSE: NORMAL
P AXIS - MUSE: 51 DEGREES
PR INTERVAL - MUSE: 118 MS
QRS DURATION - MUSE: 72 MS
QT - MUSE: 350 MS
QTC - MUSE: 411 MS
R AXIS - MUSE: 71 DEGREES
SYSTOLIC BLOOD PRESSURE - MUSE: NORMAL MMHG
T AXIS - MUSE: 47 DEGREES
VENTRICULAR RATE- MUSE: 83 BPM

## 2023-09-26 ENCOUNTER — HOSPITAL ENCOUNTER (EMERGENCY)
Facility: HOSPITAL | Age: 28
Discharge: HOME | End: 2023-09-26
Attending: STUDENT IN AN ORGANIZED HEALTH CARE EDUCATION/TRAINING PROGRAM | Admitting: STUDENT IN AN ORGANIZED HEALTH CARE EDUCATION/TRAINING PROGRAM
Payer: COMMERCIAL

## 2023-09-26 VITALS
WEIGHT: 119 LBS | DIASTOLIC BLOOD PRESSURE: 93 MMHG | SYSTOLIC BLOOD PRESSURE: 149 MMHG | TEMPERATURE: 99.4 F | RESPIRATION RATE: 22 BRPM | OXYGEN SATURATION: 99 % | BODY MASS INDEX: 19.8 KG/M2 | HEART RATE: 99 BPM

## 2023-09-26 DIAGNOSIS — R51.9 NONINTRACTABLE HEADACHE, UNSPECIFIED CHRONICITY PATTERN, UNSPECIFIED HEADACHE TYPE: ICD-10-CM

## 2023-09-26 LAB
FLUAV RNA SPEC QL NAA+PROBE: NEGATIVE
FLUBV RNA RESP QL NAA+PROBE: NEGATIVE
RSV RNA SPEC NAA+PROBE: NEGATIVE
SARS-COV-2 RNA RESP QL NAA+PROBE: NEGATIVE

## 2023-09-26 PROCEDURE — 99284 EMERGENCY DEPT VISIT MOD MDM: CPT

## 2023-09-26 PROCEDURE — 250N000013 HC RX MED GY IP 250 OP 250 PS 637: Performed by: STUDENT IN AN ORGANIZED HEALTH CARE EDUCATION/TRAINING PROGRAM

## 2023-09-26 PROCEDURE — 87637 SARSCOV2&INF A&B&RSV AMP PRB: CPT | Performed by: STUDENT IN AN ORGANIZED HEALTH CARE EDUCATION/TRAINING PROGRAM

## 2023-09-26 PROCEDURE — 250N000011 HC RX IP 250 OP 636: Mod: JZ | Performed by: STUDENT IN AN ORGANIZED HEALTH CARE EDUCATION/TRAINING PROGRAM

## 2023-09-26 PROCEDURE — 96372 THER/PROPH/DIAG INJ SC/IM: CPT | Performed by: STUDENT IN AN ORGANIZED HEALTH CARE EDUCATION/TRAINING PROGRAM

## 2023-09-26 RX ORDER — KETOROLAC TROMETHAMINE 30 MG/ML
60 INJECTION, SOLUTION INTRAMUSCULAR; INTRAVENOUS ONCE
Status: COMPLETED | OUTPATIENT
Start: 2023-09-26 | End: 2023-09-26

## 2023-09-26 RX ORDER — ACETAMINOPHEN 325 MG/1
975 TABLET ORAL ONCE
Status: DISCONTINUED | OUTPATIENT
Start: 2023-09-26 | End: 2023-09-26

## 2023-09-26 RX ORDER — DIPHENHYDRAMINE HYDROCHLORIDE 50 MG/ML
25 INJECTION INTRAMUSCULAR; INTRAVENOUS ONCE
Status: COMPLETED | OUTPATIENT
Start: 2023-09-26 | End: 2023-09-26

## 2023-09-26 RX ORDER — ACETAMINOPHEN 325 MG/1
325 TABLET ORAL ONCE
Status: COMPLETED | OUTPATIENT
Start: 2023-09-26 | End: 2023-09-26

## 2023-09-26 RX ADMIN — ACETAMINOPHEN 325 MG: 325 TABLET ORAL at 19:58

## 2023-09-26 RX ADMIN — KETOROLAC TROMETHAMINE 60 MG: 30 INJECTION, SOLUTION INTRAMUSCULAR; INTRAVENOUS at 18:44

## 2023-09-26 RX ADMIN — DIPHENHYDRAMINE HYDROCHLORIDE 25 MG: 50 INJECTION, SOLUTION INTRAMUSCULAR; INTRAVENOUS at 18:42

## 2023-09-26 ASSESSMENT — ENCOUNTER SYMPTOMS
HEADACHES: 1
NAUSEA: 1
NUMBNESS: 1

## 2023-09-26 ASSESSMENT — ACTIVITIES OF DAILY LIVING (ADL): ADLS_ACUITY_SCORE: 33

## 2023-09-26 NOTE — ED PROVIDER NOTES
EMERGENCY DEPARTMENT ENCOUNTER      NAME: Renata Staton  AGE: 28 year old female  YOB: 1995  MRN: [id number]  EVALUATION DATE & TIME: No admission date for patient encounter.    PCP: Andria Hernández    ED PROVIDER: Laci Glover M.D.      Chief Complaint   Patient presents with    Numbness    Headache         FINAL IMPRESSION:  1. Nonintractable headache, unspecified chronicity pattern, unspecified headache type          ED COURSE & MEDICAL DECISION MAKING:    Pertinent Labs & Imaging studies reviewed. (See chart for details)  28 year old female presents to the Emergency Department for evaluation of headache with some numbness.  Patient described typical migraine with aura.  She presented with some numbness that started in her arm and then moved to her face.  I felt based on the history that this most likely represented a migraine and then some anxiety symptoms.  Was called initially for stroke code however based on my communication ultimately felt that stroke code was not warranted and although is considered advanced imaging decided not to pursue.  Patient received some Toradol for her headache.  She was concerned with the possibility of COVID so COVID test was sent which was negative.  Her symptoms improved significantly and based on the totality believe this clearly represents migraine headache and some associated anxiety.  We will discharge patient home with follow-up with her primary doctor.    At the conclusion of the encounter I discussed the results of all of the tests and the disposition. The questions were answered. The patient or family acknowledged understanding and was agreeable with the care plan.              Medical Decision Making    History:  Supplemental history from: Documented in chart, if applicable  External Record(s) reviewed: Documented in chart, if applicable. and Inpatient Record: 8/1/23 [facility] ED     Work Up:  Chart documentation includes differential considered  and any EKGs or imaging independently interpreted by provider, where specified.  In additional to work up documented, I considered the following work up: Documented in chart, if applicable.    External consultation:  Discussion of management with another provider: Documented in chart, if applicable    Complicating factors:  Care impacted by chronic illness: Smoking / Nicotine Use and Other: migraine, anemia, pancreatitis  Care affected by social determinants of health: N/A    Disposition considerations: Discharge. No recommendations on prescription strength medication(s). See documentation for any additional details.        This patient involved a high degree of complexity in medical decision making, as significant risks were present and assessed. Recent encounters & results in medical record reviewed by me.     All workup (i.e. any EKG/labs/imaging as per charting below) reviewed and independently interpreted by me. See respective sections for details.       minutes of critical care time     MEDICATIONS GIVEN IN THE EMERGENCY:  Medications   ketorolac (TORADOL) injection 60 mg (60 mg Intramuscular $Given 9/26/23 1844)   diphenhydrAMINE (BENADRYL) injection 25 mg (25 mg Intramuscular $Given 9/26/23 1842)   acetaminophen (TYLENOL) tablet 325 mg (325 mg Oral $Given 9/26/23 1958)       NEW PRESCRIPTIONS STARTED AT TODAY'S ER VISIT  New Prescriptions    No medications on file          =================================================================    HPI    Patient information was obtained from: patient     Use of : N/A         Renata Staton is a 28 year old female with a pertinent history of migraine, anemia, pancreatitis, smoker, anxiety who presents for evaluation of numbness and headache.     Patient reports an acute onset of a silent migraine starting 1.5 hours ago. She notes flashing lights that turned into a right-sided migraine and left arm, face, tongue, and throat numbness ~6PM. Patient  notes the numbness scared her and it could be due to her panic. She has taken Tylenol with improvement of her migraine. Denies current face numbness. Patient endorses congestion, nausea, dyspnea, general fatigue earlier in the day.     Patient reports a history of this, MRI was negative last time.     Per chart review, patient was seen at [facility] ED on 8/1/23 for a headache. She had a normal head MRI and then left AMA.       REVIEW OF SYSTEMS   Review of Systems   Gastrointestinal:  Positive for nausea.   Neurological:  Positive for numbness (left arm, tongue, throat) and headaches.        PAST MEDICAL HISTORY:  Past Medical History:   Diagnosis Date    Anxiety     Chronic abdominal pain     Chronic pelvic pain in female     Depression     Depressive disorder 2017    History of anesthesia complications     family issues slow to wake up after surgery    Migraine        PAST SURGICAL HISTORY:  Past Surgical History:   Procedure Laterality Date    ABDOMEN SURGERY      COLONOSCOPY  10/28    ENDOSCOPIC RETROGRADE CHOLANGIOPANCREATOGRAM N/A 12/18/2019    Procedure: ENDOSCOPIC RETROGRADE CHOLANGIOPANCREATOGRAPHY with billary sphinctomy and stone extraction and  billary stent placement with epinephrine injection ;  Surgeon: Dustin Betancourt MD;  Location: [facility];  Service: Gastroenterology    ENDOSCOPIC RETROGRADE CHOLANGIOPANCREATOGRAM N/A 02/28/2020    Procedure: ENDOSCOPIC RETROGRADE CHOLANGIOPANCREATOGRAPHY, STENT REMOVAL, SLUDGE REMOVAL;  Surgeon: Dustin Betancourt MD;  Location: [facility];  Service: Gastroenterology    LAPAROSCOPIC CHOLECYSTECTOMY N/A 12/11/2019    Procedure: CHOLECYSTECTOMY, LAPAROSCOPIC;  Surgeon: Samia Portillo MD;  Location: [facility];  Service: General    ID LAP,DIAGNOSTIC ABDOMEN N/A 02/20/2018    Procedure: DIAGNOSTIC LAPAROSCOPY, LYSIS OF  ADHESIONS;  Surgeon: Manish Lyons MD;  Location: [facility];  Service: Gynecology    XR ERCP BILIARY  ONLY  12/18/2019    XR ERCP BILIARY ONLY  02/28/2020           CURRENT MEDICATIONS:    albuterol (PROAIR HFA/PROVENTIL HFA/VENTOLIN HFA) 108 (90 Base) MCG/ACT inhaler  cholecalciferol 50 MCG (2000 UT) CAPS  cyanocobalamin (VITAMIN B-12) 1000 MCG tablet  dexamethasone (DECADRON) 4 MG tablet  dimenhyDRINATE (DRAMAMINE) 50 MG CHEW  doxycycline hyclate (VIBRAMYCIN) 100 MG capsule  gabapentin (NEURONTIN) 100 MG capsule  hydrOXYzine (ATARAX) 25 MG tablet  hydrOXYzine (VISTARIL) 25 MG capsule  levonorgestrel-ethinyl estradiol (SEASONALE) 0.15-0.03 MG tablet  ondansetron (ZOFRAN ODT) 4 MG ODT tab  pseudoePHEDrine (SUDAFED) 30 MG tablet  sertraline (ZOLOFT) 25 MG tablet  sodium chloride (OCEAN) 0.65 % nasal spray  sodium chloride (OCEAN) 0.65 % nasal spray        ALLERGIES:  Allergies   Allergen Reactions    Penicillin G Anaphylaxis    Sumatriptan Hives, Swelling and Anxiety     Went to ED after taking 25mg for first time, no objective findings, likely not true allergy    Amoxicillin-Pot Clavulanate Hives    Iohexol Hives and Itching     Pt had hives after premedication.  She had a prior reaction in 2020 at the ER given Iohexol and today was given Iohexol again with premedication of Prednistone 50 mg 13, 7, 1 hour prior.  Pt developed hives on head and abdomen.  Stable vitals HR 95, BP 90/57, O2 SAT 99%.  Pt given water and time and symptoms improved.  No breathing or respiratory issues.   Pt check by RN from primary care clinic.  Benedict    Lansoprazole Hives    Peanut Oil Unknown    Peanut [Peanut Oil] Swelling     Throat swelling       FAMILY HISTORY:  Family History   Problem Relation Age of Onset    Cancer Mother     Asthma Mother     Asthma Sister     Mental Illness Sister     Heart Disease Sister     Valvular heart disease Paternal Grandfather        SOCIAL HISTORY:   Social History     Socioeconomic History    Marital status: Single   Tobacco Use    Smoking status: Every Day     Packs/day: 0.25     Years: 7.00     Pack  years: 1.75     Types: Cigarettes     Passive exposure: Never    Smokeless tobacco: Never    Tobacco comments:     7-8 cpd.   Vaping Use    Vaping Use: Never used   Substance and Sexual Activity    Alcohol use: No     Comment: Alcoholic Drinks/day: rare    Drug use: Never    Sexual activity: Yes     Partners: Male     Birth control/protection: Condom   Other Topics Concern    Parent/sibling w/ CABG, MI or angioplasty before 65F 55M? No       PHYSICAL EXAM    VITAL SIGNS: BP (!) 149/93   Pulse 99   Temp 99.4  F (37.4  C) (Temporal)   Resp 22   Wt 54 kg (119 lb)   LMP 09/05/2023   SpO2 99%   BMI 19.80 kg/m    Constitutional:  Well developed, well nourished,    EYES: Conjunctivae clear, no discharge  HENT: Atraumatic, normocephalic, bilateral external ears normal.  Oropharynx moist. Nose normal.   Neck: Normal ROM , Supple   Respiratory:  No respiratory distress, normal nonlabored respirations.   Cardiovascular:  Distal perfusion appears intact  Musculoskeletal:  No edema appreciated, No cyanosis, No clubbing. Good range of motion in all major joints.   Integument:  Warm, Dry, No erythema, No rash.   Neurologic:  Alert and oriented. No focal deficits noted.  Ambulatory.  CN 2-12 intact  Psychiatric:  Affect normal           LAB:  All pertinent labs reviewed and interpreted.  Labs Ordered and Resulted from Time of ED Arrival to Time of ED Departure   INFLUENZA A/B, RSV, & SARS-COV2 PCR - Normal       Result Value    Influenza A PCR Negative      Influenza B PCR Negative      RSV PCR Negative      SARS CoV2 PCR Negative         RADIOLOGY:  Reviewed all pertinent imaging. Please see official radiology report.  No orders to display       PROCEDURES:   N/A       I, Sarah Lazo, am serving as a scribe to document services personally performed by Dr. Laci Glover based on my observation and the provider's statements to me. Laci AREVALO MD attest that Sarah Lazo is acting in a scribe capacity, has observed  my performance of the services and has documented them in accordance with my direction.    Laci Glover M.D.  Emergency Medicine  [facility] EMERGENCY DEPARTMENT  [facility] [facility] [id number] [phone]  Dept: [phone]       Laci Glover MD  09/26/23 2050

## 2023-09-26 NOTE — ED TRIAGE NOTES
Patient reports a migraine started 90 minutes ago with flashing lights, patient states she began to panic and then had left arm, throat and tongue numbness.

## 2023-09-28 ENCOUNTER — LAB REQUISITION (OUTPATIENT)
Dept: LAB | Facility: CLINIC | Age: 28
End: 2023-09-28

## 2023-09-28 DIAGNOSIS — M25.50 PAIN IN UNSPECIFIED JOINT: ICD-10-CM

## 2023-09-28 LAB
B BURGDOR IGG+IGM SER QL: 0.1
CRP SERPL-MCNC: <3 MG/L
ERYTHROCYTE [SEDIMENTATION RATE] IN BLOOD BY WESTERGREN METHOD: 9 MM/HR (ref 0–20)

## 2023-09-28 PROCEDURE — 86140 C-REACTIVE PROTEIN: CPT | Performed by: PHYSICIAN ASSISTANT

## 2023-09-28 PROCEDURE — 86431 RHEUMATOID FACTOR QUANT: CPT | Performed by: PHYSICIAN ASSISTANT

## 2023-09-28 PROCEDURE — 85652 RBC SED RATE AUTOMATED: CPT | Performed by: PHYSICIAN ASSISTANT

## 2023-09-28 PROCEDURE — 86038 ANTINUCLEAR ANTIBODIES: CPT | Performed by: PHYSICIAN ASSISTANT

## 2023-09-28 PROCEDURE — 86618 LYME DISEASE ANTIBODY: CPT | Performed by: PHYSICIAN ASSISTANT

## 2023-09-29 LAB
ANA SER QL IF: NEGATIVE
RHEUMATOID FACT SER NEPH-ACNC: <6 IU/ML

## 2023-10-05 ENCOUNTER — HOSPITAL ENCOUNTER (EMERGENCY)
Facility: HOSPITAL | Age: 28
Discharge: HOME | End: 2023-10-05
Attending: EMERGENCY MEDICINE | Admitting: EMERGENCY MEDICINE
Payer: COMMERCIAL

## 2023-10-05 ENCOUNTER — APPOINTMENT (OUTPATIENT)
Dept: ULTRASOUND IMAGING | Facility: HOSPITAL | Age: 28
End: 2023-10-05
Attending: EMERGENCY MEDICINE
Payer: COMMERCIAL

## 2023-10-05 VITALS
HEART RATE: 115 BPM | DIASTOLIC BLOOD PRESSURE: 80 MMHG | SYSTOLIC BLOOD PRESSURE: 116 MMHG | RESPIRATION RATE: 18 BRPM | TEMPERATURE: 98.3 F | OXYGEN SATURATION: 99 %

## 2023-10-05 DIAGNOSIS — M79.661 PAIN IN BOTH LOWER LEGS: ICD-10-CM

## 2023-10-05 DIAGNOSIS — M54.2 NECK PAIN: ICD-10-CM

## 2023-10-05 DIAGNOSIS — M79.662 PAIN IN BOTH LOWER LEGS: ICD-10-CM

## 2023-10-05 PROBLEM — K85.10 GALLSTONE PANCREATITIS: Status: ACTIVE | Noted: 2022-07-04

## 2023-10-05 PROBLEM — R11.0 NAUSEA: Status: ACTIVE | Noted: 2018-06-28

## 2023-10-05 PROBLEM — K59.09 CHRONIC CONSTIPATION: Status: ACTIVE | Noted: 2023-10-05

## 2023-10-05 PROBLEM — D50.9 IRON DEFICIENCY ANEMIA: Status: ACTIVE | Noted: 2022-05-11

## 2023-10-05 PROBLEM — K29.70 GASTRITIS: Status: ACTIVE | Noted: 2022-10-28

## 2023-10-05 PROBLEM — E55.9 VITAMIN D DEFICIENCY DISEASE: Status: ACTIVE | Noted: 2022-10-09

## 2023-10-05 PROBLEM — R10.13 EPIGASTRIC PAIN: Status: ACTIVE | Noted: 2018-04-06

## 2023-10-05 PROBLEM — R10.84 GENERALIZED ABDOMINAL PAIN: Status: ACTIVE | Noted: 2018-01-05

## 2023-10-05 PROBLEM — R10.11 RIGHT UPPER QUADRANT PAIN: Status: ACTIVE | Noted: 2018-01-05

## 2023-10-05 PROBLEM — N92.0 MENORRHAGIA: Status: ACTIVE | Noted: 2021-12-16

## 2023-10-05 PROBLEM — E46 PROTEIN-CALORIE MALNUTRITION, UNSPECIFIED SEVERITY (H): Status: ACTIVE | Noted: 2022-04-14

## 2023-10-05 PROBLEM — K21.9 GASTROESOPHAGEAL REFLUX DISEASE: Status: ACTIVE | Noted: 2018-04-06

## 2023-10-05 PROBLEM — N93.8 DYSFUNCTIONAL UTERINE BLEEDING: Status: ACTIVE | Noted: 2021-12-16

## 2023-10-05 PROBLEM — R10.31 RIGHT LOWER QUADRANT PAIN: Status: ACTIVE | Noted: 2022-07-04

## 2023-10-05 PROBLEM — R63.4 ABNORMAL WEIGHT LOSS: Status: ACTIVE | Noted: 2022-05-31

## 2023-10-05 PROBLEM — R10.30 LOWER ABDOMINAL PAIN: Status: ACTIVE | Noted: 2023-10-05

## 2023-10-05 PROBLEM — N20.0 RENAL LITHIASIS: Status: ACTIVE | Noted: 2021-12-16

## 2023-10-05 LAB
ANION GAP SERPL CALCULATED.3IONS-SCNC: 12 MMOL/L (ref 7–15)
BASO+EOS+MONOS # BLD AUTO: NORMAL 10*3/UL
BASO+EOS+MONOS NFR BLD AUTO: NORMAL %
BASOPHILS # BLD AUTO: 0 10E3/UL (ref 0–0.2)
BASOPHILS NFR BLD AUTO: 1 %
BUN SERPL-MCNC: 8.2 MG/DL (ref 6–20)
CALCIUM SERPL-MCNC: 9.3 MG/DL (ref 8.6–10)
CHLORIDE SERPL-SCNC: 106 MMOL/L (ref 98–107)
CK SERPL-CCNC: 119 U/L (ref 26–192)
CREAT SERPL-MCNC: 0.71 MG/DL (ref 0.51–0.95)
DEPRECATED HCO3 PLAS-SCNC: 22 MMOL/L (ref 22–29)
EGFRCR SERPLBLD CKD-EPI 2021: >90 ML/MIN/1.73M2
EOSINOPHIL # BLD AUTO: 0 10E3/UL (ref 0–0.7)
EOSINOPHIL NFR BLD AUTO: 1 %
ERYTHROCYTE [DISTWIDTH] IN BLOOD BY AUTOMATED COUNT: 11.5 % (ref 10–15)
GLUCOSE SERPL-MCNC: 71 MG/DL (ref 70–99)
HCT VFR BLD AUTO: 44.1 % (ref 35–47)
HGB BLD-MCNC: 15.5 G/DL (ref 11.7–15.7)
HOLD SPECIMEN: NORMAL
IMM GRANULOCYTES # BLD: 0 10E3/UL
IMM GRANULOCYTES NFR BLD: 0 %
LYMPHOCYTES # BLD AUTO: 1.9 10E3/UL (ref 0.8–5.3)
LYMPHOCYTES NFR BLD AUTO: 37 %
MCH RBC QN AUTO: 32.1 PG (ref 26.5–33)
MCHC RBC AUTO-ENTMCNC: 35.1 G/DL (ref 31.5–36.5)
MCV RBC AUTO: 91 FL (ref 78–100)
MONOCYTES # BLD AUTO: 0.3 10E3/UL (ref 0–1.3)
MONOCYTES NFR BLD AUTO: 6 %
NEUTROPHILS # BLD AUTO: 2.8 10E3/UL (ref 1.6–8.3)
NEUTROPHILS NFR BLD AUTO: 55 %
NRBC # BLD AUTO: 0 10E3/UL
NRBC BLD AUTO-RTO: 0 /100
PLATELET # BLD AUTO: 263 10E3/UL (ref 150–450)
POTASSIUM SERPL-SCNC: 3.8 MMOL/L (ref 3.4–5.3)
RBC # BLD AUTO: 4.83 10E6/UL (ref 3.8–5.2)
SODIUM SERPL-SCNC: 140 MMOL/L (ref 135–145)
WBC # BLD AUTO: 5.1 10E3/UL (ref 4–11)

## 2023-10-05 PROCEDURE — 82310 ASSAY OF CALCIUM: CPT | Performed by: EMERGENCY MEDICINE

## 2023-10-05 PROCEDURE — 82374 ASSAY BLOOD CARBON DIOXIDE: CPT | Performed by: EMERGENCY MEDICINE

## 2023-10-05 PROCEDURE — 82550 ASSAY OF CK (CPK): CPT | Performed by: EMERGENCY MEDICINE

## 2023-10-05 PROCEDURE — 93971 EXTREMITY STUDY: CPT | Mod: RT

## 2023-10-05 PROCEDURE — 85004 AUTOMATED DIFF WBC COUNT: CPT | Performed by: EMERGENCY MEDICINE

## 2023-10-05 PROCEDURE — 99284 EMERGENCY DEPT VISIT MOD MDM: CPT | Mod: 25

## 2023-10-05 PROCEDURE — 36415 COLL VENOUS BLD VENIPUNCTURE: CPT | Performed by: EMERGENCY MEDICINE

## 2023-10-05 ASSESSMENT — ACTIVITIES OF DAILY LIVING (ADL): ADLS_ACUITY_SCORE: 35

## 2023-10-05 NOTE — ED TRIAGE NOTES
"Pt c/o intermittent anterior bilat upper leg pain. Pt also having neck pain and multiple other complaints including night sweats, legs \"locking\" in shower, and \"not feeling well\" no fevers at home.      Triage Assessment       Row Name 10/05/23 0963       Triage Assessment (Adult)    Airway WDL WDL       Respiratory WDL    Respiratory WDL WDL       Skin Circulation/Temperature WDL    Skin Circulation/Temperature WDL WDL       Cardiac WDL    Cardiac WDL WDL       Peripheral/Neurovascular WDL    Peripheral Neurovascular WDL WDL       Cognitive/Neuro/Behavioral WDL    Cognitive/Neuro/Behavioral WDL WDL                    "

## 2023-10-05 NOTE — ED PROVIDER NOTES
EMERGENCY DEPARTMENT ENCOUNTER      NAME: Renata Staton  AGE: 28 year old female  YOB: 1995  MRN: [id number]  EVALUATION DATE & TIME: 10/5/2023  9:39 AM    PCP: Andria Hernández    ED PROVIDER: Giuseppe Fox M.D.      Chief Complaint   Patient presents with    Leg Pain         FINAL IMPRESSION:  1. Pain in both lower legs    2. Neck pain          ED COURSE & MEDICAL DECISION MAKIN year old female presents to the Emergency Department for evaluation of leg pain and neck pain.  Patient is vitally stable when she arrives to the emergency department.  She reports migratory pain throughout her bilateral lower extremities currently greatest on the right.  Denies any history of trauma.  Also reports that the left side of her neck feels tight.  Bilateral lower extremities are without any visible swelling, erythema, and with normal range of motion in all major joints.  Neck is supple and there is no swelling of the oropharynx, nothing for visible trauma or deformity of any of the areas of concern.  Patient underwent DVT ultrasound of the right leg which is currently bothering her most as this was her primary concern today and this was thankfully negative for thrombus.  She also had reassuring lab testing including normal white blood cell count and normal CK.  She was resting stably on recheck here.  We discussed a plan for anti-inflammatory medications, Tylenol, and clinic follow-up to review any ongoing concerns.  Patient was comfortable this plan and discharged in stable condition.    At the conclusion of the encounter I discussed the results of all of the tests and the disposition. The questions were answered. The patient or family acknowledged understanding and was agreeable with the care plan.       Medical Decision Making    History:  Supplemental history from: Documented in chart, if applicable  External Record(s) reviewed: Outpatient Record: 2023 (See HPI)    Work Up:  Chart  documentation includes differential considered and any EKGs or imaging independently interpreted by provider, where specified.  In additional to work up documented, I considered the following work up: Documented in chart, if applicable.    External consultation:  Discussion of management with another provider: Documented in chart, if applicable    Complicating factors:  Care impacted by chronic illness: Smoking / Nicotine Use  Care affected by social determinants of health: N/A    Disposition considerations: Discharge. No recommendations on prescription strength medication(s). See documentation for any additional details.            MEDICATIONS GIVEN IN THE EMERGENCY:  Medications - No data to display    NEW PRESCRIPTIONS STARTED AT TODAY'S ER VISIT  Discharge Medication List as of 10/5/2023 11:06 AM             =================================================================    HPI    Patient information was obtained from: Patient    Use of : N/A        Renata Staton is a 28 year old female with a pertinent history of smoking who presents to this ED via private vehicle for evaluation of leg pain.    Per chart review, patient was seen on 9/14 (~3 weeks ago) at [facility] for right flank pain. US unremarkable. Wet prep unremarkable. US without infection. Prescribed IM Rocephin doxycycline and Flagyl. Discharged in stable condition.     Patient states that she developed intermittent pain in her left leg ~3 days ago. Pain radiated from her calf up to her thigh. Pain is exacerbated with activity. She notes that her legs lock up while she is showering. Today she presents with right left pain that started today, no pain in the left leg. Using ice and heat. She had a similar episode ~1 year ago and was evaluated, no clot was found. Endorses left-sided neck pain, chest pain, congestion, and sneezing. Denies fever.    Of note, patient smokes but has recently cut back to smoking ~1 cigarette a  day.      REVIEW OF SYSTEMS   All systems reviewed and negative except as noted in HPI.    PAST MEDICAL HISTORY:  Past Medical History:   Diagnosis Date    Anxiety     Chronic abdominal pain     Chronic pelvic pain in female     Depression     Depressive disorder 2017    History of anesthesia complications     family issues slow to wake up after surgery    Migraine        PAST SURGICAL HISTORY:  Past Surgical History:   Procedure Laterality Date    ABDOMEN SURGERY      COLONOSCOPY  10/28    ENDOSCOPIC RETROGRADE CHOLANGIOPANCREATOGRAM N/A 12/18/2019    Procedure: ENDOSCOPIC RETROGRADE CHOLANGIOPANCREATOGRAPHY with billary sphinctomy and stone extraction and  billary stent placement with epinephrine injection ;  Surgeon: Dustin Betancourt MD;  Location: [facility];  Service: Gastroenterology    ENDOSCOPIC RETROGRADE CHOLANGIOPANCREATOGRAM N/A 02/28/2020    Procedure: ENDOSCOPIC RETROGRADE CHOLANGIOPANCREATOGRAPHY, STENT REMOVAL, SLUDGE REMOVAL;  Surgeon: Dustin Betancourt MD;  Location: [facility];  Service: Gastroenterology    LAPAROSCOPIC CHOLECYSTECTOMY N/A 12/11/2019    Procedure: CHOLECYSTECTOMY, LAPAROSCOPIC;  Surgeon: Samia Portillo MD;  Location: [facility];  Service: General    AL LAP,DIAGNOSTIC ABDOMEN N/A 02/20/2018    Procedure: DIAGNOSTIC LAPAROSCOPY, LYSIS OF  ADHESIONS;  Surgeon: Manish Lyons MD;  Location: [facility];  Service: Gynecology    XR ERCP BILIARY ONLY  12/18/2019    XR ERCP BILIARY ONLY  02/28/2020           CURRENT MEDICATIONS:    No current facility-administered medications for this encounter.     Current Outpatient Medications   Medication    albuterol (PROAIR HFA/PROVENTIL HFA/VENTOLIN HFA) 108 (90 Base) MCG/ACT inhaler    cholecalciferol 50 MCG (2000 UT) CAPS    cyanocobalamin (VITAMIN B-12) 1000 MCG tablet    dexamethasone (DECADRON) 4 MG tablet    dimenhyDRINATE (DRAMAMINE) 50 MG CHEW    doxycycline hyclate (VIBRAMYCIN) 100 MG capsule     gabapentin (NEURONTIN) 100 MG capsule    hydrOXYzine (ATARAX) 25 MG tablet    hydrOXYzine (VISTARIL) 25 MG capsule    levonorgestrel-ethinyl estradiol (SEASONALE) 0.15-0.03 MG tablet    ondansetron (ZOFRAN ODT) 4 MG ODT tab    pseudoePHEDrine (SUDAFED) 30 MG tablet    sertraline (ZOLOFT) 25 MG tablet    sodium chloride (OCEAN) 0.65 % nasal spray    sodium chloride (OCEAN) 0.65 % nasal spray         ALLERGIES:  Allergies   Allergen Reactions    Penicillin G Anaphylaxis    Sumatriptan Hives, Swelling and Anxiety     Went to ED after taking 25mg for first time, no objective findings, likely not true allergy    Amoxicillin-Pot Clavulanate Hives    Iohexol Hives and Itching     Pt had hives after premedication.  She had a prior reaction in 2020 at the ER given Iohexol and today was given Iohexol again with premedication of Prednistone 50 mg 13, 7, 1 hour prior.  Pt developed hives on head and abdomen.  Stable vitals HR 95, BP 90/57, O2 SAT 99%.  Pt given water and time and symptoms improved.  No breathing or respiratory issues.   Pt check by RN from primary care clinic.  Benedict    Lansoprazole Hives    Peanut Oil Unknown    Peanut [Peanut Oil] Swelling     Throat swelling       FAMILY HISTORY:  Family History   Problem Relation Age of Onset    Cancer Mother     Asthma Mother     Asthma Sister     Mental Illness Sister     Heart Disease Sister     Valvular heart disease Paternal Grandfather        SOCIAL HISTORY:   Social History     Socioeconomic History    Marital status: Single   Tobacco Use    Smoking status: Every Day     Packs/day: 0.25     Years: 7.00     Pack years: 1.75     Types: Cigarettes     Passive exposure: Never    Smokeless tobacco: Never    Tobacco comments:     7-8 cpd.   Vaping Use    Vaping Use: Never used   Substance and Sexual Activity    Alcohol use: No     Comment: Alcoholic Drinks/day: rare    Drug use: Never    Sexual activity: Yes     Partners: Male     Birth control/protection: Condom   Other  Topics Concern    Parent/sibling w/ CABG, MI or angioplasty before 65F 55M? No       VITALS:  /80   Pulse 115   Temp 98.3  F (36.8  C)   Resp 18   LMP 09/05/2023   SpO2 99%     PHYSICAL EXAM    Constitutional: Well developed, Well nourished, NAD.  HENT: Normocephalic, Atraumatic. Neck Supple. OP clear.  Eyes: EOMI, Conjunctiva normal.  Respiratory: Breathing comfortably on room air. Speaks full sentences easily. Lungs clear to ascultation.  Cardiovascular: Normal heart rate, Regular rhythm. No peripheral edema.  Abdomen: Soft, nontender  Musculoskeletal: Good range of motion in all major joints. No major deformities noted.  Integument: Warm, Dry.  Neurologic: Alert & awake, Normal motor function, Normal sensory function, No focal deficits noted.   Psychiatric: Cooperative. Affect appropriate.     LAB:  All pertinent labs reviewed and interpreted.  Labs Ordered and Resulted from Time of ED Arrival to Time of ED Departure   BASIC METABOLIC PANEL - Normal       Result Value    Sodium 140      Potassium 3.8      Chloride 106      Carbon Dioxide (CO2) 22      Anion Gap 12      Urea Nitrogen 8.2      Creatinine 0.71      GFR Estimate >90      Calcium 9.3      Glucose 71     CK TOTAL - Normal         CBC WITH PLATELETS AND DIFFERENTIAL    WBC Count 5.1      RBC Count 4.83      Hemoglobin 15.5      Hematocrit 44.1      MCV 91      MCH 32.1      MCHC 35.1      RDW 11.5      Platelet Count 263      % Neutrophils 55      % Lymphocytes 37      % Monocytes 6      Mids % (Monos, Eos, Basos)        % Eosinophils 1      % Basophils 1      % Immature Granulocytes 0      NRBCs per 100 WBC 0      Absolute Neutrophils 2.8      Absolute Lymphocytes 1.9      Absolute Monocytes 0.3      Mids Abs (Monos, Eos, Basos)        Absolute Eosinophils 0.0      Absolute Basophils 0.0      Absolute Immature Granulocytes 0.0      Absolute NRBCs 0.0         RADIOLOGY:  Reviewed all pertinent imaging. Please see official radiology  report.  US Lower Extremity Venous Duplex Right   Final Result   IMPRESSION:   1.  No deep venous thrombosis in the right lower extremity.              I, Margarita Lane, am serving as a scribe to document services personally performed by Dr. Giuseppe Fox, based on my observation and the provider's statements to me. I, Giuseppe Fox MD attest that Margarita Lane is acting in a scribe capacity, has observed my performance of the services and has documented them in accordance with my direction.    Giuseppe Fox M.D.  Emergency Medicine  [facility] EMERGENCY DEPARTMENT  [address] [place] [id number]  [phone]  Dept: [phone]       Giuseppe Fox MD  10/05/23 9237

## 2023-10-05 NOTE — DISCHARGE INSTRUCTIONS
You were seen in the emergency department for intermittent pain in your legs as well as your neck.  Your evaluation included a DVT ultrasound of your right leg which is more painful today and lab tests which all look stable and reassuring.  We did not see any signs of blood clot, electrolyte problem, or elevated inflammatory markers or evidence of muscle injury.  We do think that your pain could be related to something like muscle strain or tension.  We would recommend continuing Tylenol, ice and heat packs, and add an anti-inflammatory like naproxen 220 mg twice a day which would be available over-the-counter.  You can also use things like topical lidocaine patches.  We are hopeful that your symptoms will be improving in the next few days.  We would recommend clinic follow-up for any lingering concerns by next week.

## 2023-10-15 ENCOUNTER — HEALTH MAINTENANCE LETTER (OUTPATIENT)
Age: 28
End: 2023-10-15

## 2023-10-19 PROBLEM — R10.31 RIGHT LOWER QUADRANT PAIN: Status: RESOLVED | Noted: 2022-07-04 | Resolved: 2023-10-19

## 2023-10-19 PROBLEM — K59.09 CHRONIC CONSTIPATION: Status: RESOLVED | Noted: 2023-10-05 | Resolved: 2023-10-19

## 2023-10-19 PROBLEM — R10.84 GENERALIZED ABDOMINAL PAIN: Status: RESOLVED | Noted: 2018-01-05 | Resolved: 2023-10-19

## 2023-10-19 PROBLEM — K85.90 ACUTE PANCREATITIS WITHOUT INFECTION OR NECROSIS: Status: RESOLVED | Noted: 2019-12-10 | Resolved: 2023-10-19

## 2023-10-19 PROBLEM — R10.11 RIGHT UPPER QUADRANT PAIN: Status: RESOLVED | Noted: 2018-01-05 | Resolved: 2023-10-19

## 2023-10-19 PROBLEM — R10.30 LOWER ABDOMINAL PAIN: Status: RESOLVED | Noted: 2023-10-05 | Resolved: 2023-10-19

## 2023-10-19 PROBLEM — K29.70 GASTRITIS: Status: RESOLVED | Noted: 2022-10-28 | Resolved: 2023-10-19

## 2023-10-19 PROBLEM — N76.0 VAGINAL INFECTION: Status: RESOLVED | Noted: 2019-09-20 | Resolved: 2023-10-19

## 2023-10-19 PROBLEM — N92.0 MENORRHAGIA: Status: RESOLVED | Noted: 2021-12-16 | Resolved: 2023-10-19

## 2023-10-19 PROBLEM — R11.0 NAUSEA: Status: RESOLVED | Noted: 2018-06-28 | Resolved: 2023-10-19

## 2023-10-19 PROBLEM — R63.4 ABNORMAL WEIGHT LOSS: Status: RESOLVED | Noted: 2022-05-31 | Resolved: 2023-10-19

## 2023-10-19 PROBLEM — K85.90 ACUTE PANCREATITIS, UNSPECIFIED COMPLICATION STATUS, UNSPECIFIED PANCREATITIS TYPE: Status: RESOLVED | Noted: 2019-12-10 | Resolved: 2023-10-19

## 2023-10-19 PROBLEM — F41.9 ANXIETY: Status: RESOLVED | Noted: 2019-09-09 | Resolved: 2023-10-19

## 2023-10-19 PROBLEM — R33.9 URINARY RETENTION: Status: RESOLVED | Noted: 2019-12-11 | Resolved: 2023-10-19

## 2023-10-19 PROBLEM — R10.13 EPIGASTRIC PAIN: Status: RESOLVED | Noted: 2018-04-06 | Resolved: 2023-10-19

## 2023-10-19 NOTE — PROGRESS NOTES
NEUROLOGY CONSULTATION NOTE       [facility]UROLOG[facility] [place]  [address] [place] [address]  Tel: [phone]  Fax: [phone]  [email or web address]     Renata Staton,  1995, MRN [id number]  PCP: Andria Hernández  Date: 10/20/2023     ASSESSMENT & PLAN     Visit Diagnosis  Migraine with aura and without status migrainosus, not intractable     Migraine headache with aura  28-year-old female with history of depression with anxiety, vitamin B12 and D deficiency was referred for evaluation of progressively worsening headaches.  She has migraine headache with aura that usually are triggered close to her menstrual cycle.  I offered to start her on prophylactic agent but she wants to try Aleve first.  I have recommended:    1.  Take Aleve 200 mg daily for 5 days during her menstrual cycle  2.  If she fails to show improvement with above measures she will let me know to consider starting her on Depakote or Topamax  3.  For abortive treatment we will need to use CGRP antagonists as she is allergic to triptan  4.  Follow-up will be in as needed basis    Thank you again for this referral, please feel free to contact me if you have any questions.    Callum Benitez MD  [facility]  (Former[facility], [facility]     REASON FOR CONSULTATION Headache        HISTORY OF PRESENT ILLNESS     We have been requested by Dr. Hernández to evaluate Renata Staton who is a 28 year old  female for headache    Patient is a 28-year-old female with history of depression and anxiety, B12 deficiency, vitamin D deficiency who was referred for evaluation of progressively worsening headaches.  According to patient she started having headaches in May 2023.  She usually gets visual symptoms when her vision gets blurry and she cannot see followed by pounding headaches.  This occurs once a month usually close to her menstrual cycle.  She tried Imitrex but  developed some allergic reaction.  She gets quite nervous with visual symptoms.  She had MRI of the brain and CT scan that were normal.  Prior to these symptom onset she used to get headache but never had an aura and the headaches were not that severe.  Other than menstrual cycle she cannot identify other triggers for her headaches there is no history of any focal weakness, numbness, speech difficulty.     PROBLEM LIST   Patient Active Problem List   Diagnosis Code    Chronic pelvic pain in female R10.2, G89.29    Tobacco use in pregnancy O99.330    Acute gallstone pancreatitis K85.10    Elevated AST (SGOT) R74.01    Cholelithiasis and acute cholecystitis with obstruction K80.01    Pancreatitis, recurrent [id number]    Smoker F17.200    Migraine with aura and without status migrainosus, not intractable G43.109    Chronic daily headache R51.9    Anxiety and depression F41.9, F32.A    B12 deficiency E53.8    Dysfunctional uterine bleeding N93.8    Gastroesophageal reflux disease K21.9    Iron deficiency anemia D50.9    Protein-calorie malnutrition, unspecified severity (H24) E46    Renal lithiasis N20.0    Vitamin D deficiency disease E55.9    Gallstone pancreatitis K85.10         PAST MEDICAL & SURGICAL HISTORY     Past Medical History:   Patient  has a past medical history of Acute pancreatitis without infection or necrosis, Anxiety, Chronic abdominal pain, Chronic pelvic pain in female, Depression, Depressive disorder (2017), History of anesthesia complications, and Migraine.    Surgical History:  She  has a past surgical history that includes Pr Lap,Diagnostic Abdomen (N/A, 02/20/2018); Laparoscopic cholecystectomy (N/A, 12/11/2019); Xr Ercp Biliary Only (12/18/2019); Endoscopic retrograde cholangiopancreatogram (N/A, 12/18/2019); Xr Ercp Biliary Only (02/28/2020); Endoscopic retrograde cholangiopancreatogram (N/A, 02/28/2020); Abdomen surgery; and colonoscopy (10/28).     SOCIAL HISTORY     Reviewed, and she   reports that she has been smoking cigarettes. She has a 1.75 pack-year smoking history. She has never been exposed to tobacco smoke. She has never used smokeless tobacco. She reports that she does not drink alcohol and does not use drugs.     FAMILY HISTORY     Reviewed, and family history includes Asthma in her mother and sister; Cancer in her mother; Heart Disease in her sister; Mental Illness in her sister; Valvular heart disease in her paternal grandfather.     ALLERGIES     Allergies   Allergen Reactions    Penicillin G Anaphylaxis    Sumatriptan Hives, Swelling and Anxiety     Went to ED after taking 25mg for first time, no objective findings, likely not true allergy    Amoxicillin-Pot Clavulanate Hives    Iohexol Hives and Itching     Pt had hives after premedication.  She had a prior reaction in 2020 at the ER given Iohexol and today was given Iohexol again with premedication of Prednistone 50 mg 13, 7, 1 hour prior.  Pt developed hives on head and abdomen.  Stable vitals HR 95, BP 90/57, O2 SAT 99%.  Pt given water and time and symptoms improved.  No breathing or respiratory issues.   Pt check by RN from primary care clinic.  Benedict    Lansoprazole Hives    Peanut Oil Unknown    Peanut [Peanut Oil] Swelling     Throat swelling         REVIEW OF SYSTEMS     A 12 point review of system was performed and was negative except as outlined in the history of present illness.     HOME MEDICATIONS     Current Outpatient Rx   Medication Sig Dispense Refill    albuterol (PROAIR HFA/PROVENTIL HFA/VENTOLIN HFA) 108 (90 Base) MCG/ACT inhaler Inhale 2 puffs into the lungs every 6 hours as needed for shortness of breath, wheezing or cough 18 g 0    dimenhyDRINATE (DRAMAMINE) 50 MG CHEW Take 1 tablet (50 mg) by mouth every 6 hours as needed for other (nausea) 10 tablet 0    doxycycline monohydrate (MONODOX) 100 MG capsule take 1 capsule by mouth twice daily for 7 days      gabapentin (NEURONTIN) 100 MG capsule Take 1 capsule  "(100 mg) by mouth 3 times daily 20 capsule 1    hydrOXYzine (ATARAX) 25 MG tablet Take 1 tablet (25 mg) by mouth 3 times daily as needed for itching 20 tablet 0    hydrOXYzine (VISTARIL) 25 MG capsule Take 1 capsule (25 mg) by mouth 3 times daily as needed for anxiety 20 capsule 0    ondansetron (ZOFRAN ODT) 4 MG ODT tab Take 1 tablet (4 mg) by mouth every 8 hours as needed for nausea or vomiting 10 tablet 0    sertraline (ZOLOFT) 25 MG tablet Take 1 tablet (25 mg) by mouth daily 30 tablet 1    sodium chloride (OCEAN) 0.65 % nasal spray Use once a day to both nostrils to help with nasal congestion 60 mL 0    sodium chloride (OCEAN) 0.65 % nasal spray As needed for nasal congestion. 30 mL 0    dexamethasone (DECADRON) 4 MG tablet Take 1 tablet (4 mg) by mouth once for 1 dose 1 tablet 0         PHYSICAL EXAM     Vital signs  Ht 1.651 m (5' 5\")   Wt 54.9 kg (121 lb)   LMP 09/05/2023   BMI 20.14 kg/m      Weight:   121 lbs 0 oz    Patient is alert and oriented x4 in no acute distress. Vital signs were reviewed and are documented in electronic medical record. Neck was supple, no carotid bruits, thyromegaly, JVD, or lymphadenopathy was noted.   NEUROLOGY EXAM:   Patient s speech was normal with no aphasia or dysarthria. Mentation, and affect were also normal.    Funduscopic exam was normal, with normal cup to disc ratio. Cranial nerves II -XII were intact.    Patient had normal mass, tone and motor strength was 5/5 in all extremities without pronator drift.    Sensation was intact to light touch, pinprick, and vibratory sensation.    Reflexes were 1+ symmetrical with downgoing toes.    No dysmetria noted on FNF or HKS. Romberg was negative.   Gait testing was normal. Able to walk on toes/heels. Tandem walk normal.     PERTINENT DIAGNOSTIC STUDIES     Following studies were reviewed:     CT BRAIN 6/17/2022  1.  No acute intracranial abnormality.  2.  Paranasal sinus mucosal thickening, worst involving the left maxillary " sinus with left maxillary sinus air-fluid level.  3.  Left maxillary tooth periapical abscess.    MRI BRAIN 8/1/2023  Normal brain MRI     PERTINENT LABS  Following labs were reviewed:  Admission on 10/05/2023, Discharged on 10/05/2023   Component Date Value Ref Range Status    Sodium 10/05/2023 140  135 - 145 mmol/L Final    Potassium 10/05/2023 3.8  3.4 - 5.3 mmol/L Final    Chloride 10/05/2023 106  98 - 107 mmol/L Final    Carbon Dioxide (CO2) 10/05/2023 22  22 - 29 mmol/L Final    Anion Gap 10/05/2023 12  7 - 15 mmol/L Final    Urea Nitrogen 10/05/2023 8.2  6.0 - 20.0 mg/dL Final    Creatinine 10/05/2023 0.71  0.51 - 0.95 mg/dL Final    GFR Estimate 10/05/2023 >90  >60 mL/min/1.73m2 Final    Calcium 10/05/2023 9.3  8.6 - 10.0 mg/dL Final    Glucose 10/05/2023 71  70 - 99 mg/dL Final    CK 10/05/2023 119  26 - 192 U/L Final    Hold Specimen 10/05/2023 JIC   Final    WBC Count 10/05/2023 5.1  4.0 - 11.0 10e3/uL Final    RBC Count 10/05/2023 4.83  3.80 - 5.20 10e6/uL Final    Hemoglobin 10/05/2023 15.5  11.7 - 15.7 g/dL Final    Hematocrit 10/05/2023 44.1  35.0 - 47.0 % Final    MCV 10/05/2023 91  78 - 100 fL Final    MCH 10/05/2023 32.1  26.5 - 33.0 pg Final    MCHC 10/05/2023 35.1  31.5 - 36.5 g/dL Final    RDW 10/05/2023 11.5  10.0 - 15.0 % Final    Platelet Count 10/05/2023 263  150 - 450 10e3/uL Final    % Neutrophils 10/05/2023 55  % Final    % Lymphocytes 10/05/2023 37  % Final    % Monocytes 10/05/2023 6  % Final    % Eosinophils 10/05/2023 1  % Final    % Basophils 10/05/2023 1  % Final    % Immature Granulocytes 10/05/2023 0  % Final    NRBCs per 100 WBC 10/05/2023 0  <1 /100 Final    Absolute Neutrophils 10/05/2023 2.8  1.6 - 8.3 10e3/uL Final    Absolute Lymphocytes 10/05/2023 1.9  0.8 - 5.3 10e3/uL Final    Absolute Monocytes 10/05/2023 0.3  0.0 - 1.3 10e3/uL Final    Absolute Eosinophils 10/05/2023 0.0  0.0 - 0.7 10e3/uL Final    Absolute Basophils 10/05/2023 0.0  0.0 - 0.2 10e3/uL Final    Absolute  Immature Granulocytes 10/05/2023 0.0  <=0.4 10e3/uL Final    Absolute NRBCs 10/05/2023 0.0  10e3/uL Final   Lab Requisition on 09/28/2023   Component Date Value Ref Range Status    Erythrocyte Sedimentation Rate 09/28/2023 9  0 - 20 mm/hr Final    CRP Inflammation 09/28/2023 <3.00  <5.00 mg/L Final    Lyme Disease Antibodies Total 09/28/2023 0.10  <0.90 Final    Rheumatoid Factor 09/28/2023 <6  <12 IU/mL Final    EMPERATRIZ interpretation 09/28/2023 Negative  Negative Final   Admission on 09/26/2023, Discharged on 09/26/2023   Component Date Value Ref Range Status    Influenza A PCR 09/26/2023 Negative  Negative Final    Influenza B PCR 09/26/2023 Negative  Negative Final    RSV PCR 09/26/2023 Negative  Negative Final    SARS CoV2 PCR 09/26/2023 Negative  Negative Final   Admission on 08/28/2023, Discharged on 08/28/2023   Component Date Value Ref Range Status    WBC Count 08/28/2023 8.6  4.0 - 11.0 10e3/uL Final    RBC Count 08/28/2023 4.70  3.80 - 5.20 10e6/uL Final    Hemoglobin 08/28/2023 14.9  11.7 - 15.7 g/dL Final    Hematocrit 08/28/2023 43.6  35.0 - 47.0 % Final    MCV 08/28/2023 93  78 - 100 fL Final    MCH 08/28/2023 31.7  26.5 - 33.0 pg Final    MCHC 08/28/2023 34.2  31.5 - 36.5 g/dL Final    RDW 08/28/2023 11.9  10.0 - 15.0 % Final    Platelet Count 08/28/2023 248  150 - 450 10e3/uL Final    Sodium 08/28/2023 139  136 - 145 mmol/L Final    Potassium 08/28/2023 4.0  3.4 - 5.3 mmol/L Final    Chloride 08/28/2023 106  98 - 107 mmol/L Final    Carbon Dioxide (CO2) 08/28/2023 23  22 - 29 mmol/L Final    Anion Gap 08/28/2023 10  7 - 15 mmol/L Final    Urea Nitrogen 08/28/2023 6.5  6.0 - 20.0 mg/dL Final    Creatinine 08/28/2023 0.63  0.51 - 0.95 mg/dL Final    Calcium 08/28/2023 9.3  8.6 - 10.0 mg/dL Final    Glucose 08/28/2023 87  70 - 99 mg/dL Final    GFR Estimate 08/28/2023 >90  >60 mL/min/1.73m2 Final    Magnesium 08/28/2023 2.1  1.7 - 2.3 mg/dL Final    Ventricular Rate 08/28/2023 83  BPM Final    Atrial Rate  08/28/2023 83  BPM Final    NH Interval 08/28/2023 118  ms Final    QRS Duration 08/28/2023 72  ms Final    QT 08/28/2023 350  ms Final    QTc 08/28/2023 411  ms Final    P Axis 08/28/2023 51  degrees Final    R AXIS 08/28/2023 71  degrees Final    T Axis 08/28/2023 47  degrees Final    Interpretation ECG 08/28/2023    Final                    Value:Sinus rhythm  Normal ECG  When compared with ECG of 18-MAY-2023 18:59,  No significant change was found  Confirmed by SEE ED PROVIDER NOTE FOR, ECG INTERPRETATION ([id number]),  FLY GARCIA ([id number]) on 9/1/2023 1:05:31 AM      Troponin T, High Sensitivity 08/28/2023 <6  <=14 ng/L Final    D-Dimer Quantitative 08/28/2023 0.32  0.00 - 0.50 ug/mL FEU Final    TSH 08/28/2023 1.36  0.30 - 4.20 uIU/mL Final    Influenza A PCR 08/28/2023 Negative  Negative Final    Influenza B PCR 08/28/2023 Negative  Negative Final    RSV PCR 08/28/2023 Negative  Negative Final    SARS CoV2 PCR 08/28/2023 Negative  Negative Final   Admission on 08/01/2023, Discharged on 08/01/2023   Component Date Value Ref Range Status    Sodium 08/01/2023 137  136 - 145 mmol/L Final    Potassium 08/01/2023 3.5  3.4 - 5.3 mmol/L Final    Chloride 08/01/2023 104  98 - 107 mmol/L Final    Carbon Dioxide (CO2) 08/01/2023 21 (L)  22 - 29 mmol/L Final    Anion Gap 08/01/2023 12  7 - 15 mmol/L Final    Urea Nitrogen 08/01/2023 7.2  6.0 - 20.0 mg/dL Final    Creatinine 08/01/2023 0.64  0.51 - 0.95 mg/dL Final    Calcium 08/01/2023 9.9  8.6 - 10.0 mg/dL Final    Glucose 08/01/2023 90  70 - 99 mg/dL Final    Alkaline Phosphatase 08/01/2023 101  35 - 104 U/L Final    AST 08/01/2023 24  0 - 45 U/L Final    ALT 08/01/2023 18  0 - 50 U/L Final    Protein Total 08/01/2023 7.8  6.4 - 8.3 g/dL Final    Albumin 08/01/2023 4.8  3.5 - 5.2 g/dL Final    Bilirubin Total 08/01/2023 0.4  <=1.2 mg/dL Final    GFR Estimate 08/01/2023 >90  >60 mL/min/1.73m2 Final    hCG Serum Qualitative 08/01/2023 Negative  Negative Final    Group  A strep by PCR 08/01/2023 Not Detected  Not Detected Final    WBC Count 08/01/2023 8.0  4.0 - 11.0 10e3/uL Final    RBC Count 08/01/2023 4.54  3.80 - 5.20 10e6/uL Final    Hemoglobin 08/01/2023 14.3  11.7 - 15.7 g/dL Final    Hematocrit 08/01/2023 41.6  35.0 - 47.0 % Final    MCV 08/01/2023 92  78 - 100 fL Final    MCH 08/01/2023 31.5  26.5 - 33.0 pg Final    MCHC 08/01/2023 34.4  31.5 - 36.5 g/dL Final    RDW 08/01/2023 11.6  10.0 - 15.0 % Final    Platelet Count 08/01/2023 297  150 - 450 10e3/uL Final    % Neutrophils 08/01/2023 61  % Final    % Lymphocytes 08/01/2023 30  % Final    % Monocytes 08/01/2023 8  % Final    % Eosinophils 08/01/2023 0  % Final    % Basophils 08/01/2023 1  % Final    % Immature Granulocytes 08/01/2023 0  % Final    NRBCs per 100 WBC 08/01/2023 0  <1 /100 Final    Absolute Neutrophils 08/01/2023 4.9  1.6 - 8.3 10e3/uL Final    Absolute Lymphocytes 08/01/2023 2.4  0.8 - 5.3 10e3/uL Final    Absolute Monocytes 08/01/2023 0.6  0.0 - 1.3 10e3/uL Final    Absolute Eosinophils 08/01/2023 0.0  0.0 - 0.7 10e3/uL Final    Absolute Basophils 08/01/2023 0.0  0.0 - 0.2 10e3/uL Final    Absolute Immature Granulocytes 08/01/2023 0.0  <=0.4 10e3/uL Final    Absolute NRBCs 08/01/2023 0.0  10e3/uL Final        Total time spent for face to face visit, reviewing labs/imaging studies, counseling and coordination of care was: 1 Hour spent on the date of the encounter doing chart review, review of outside records, review of test results, interpretation of tests, patient visit, and documentation     This note was dictated using voice recognition software.  Any grammatical or context distortions are unintentional and inherent to the software.    No orders of the defined types were placed in this encounter.     New Prescriptions    No medications on file      Modified Medications    No medications on file

## 2023-10-20 ENCOUNTER — OFFICE VISIT (OUTPATIENT)
Dept: NEUROLOGY | Facility: CLINIC | Age: 28
End: 2023-10-20
Attending: NURSE PRACTITIONER
Payer: COMMERCIAL

## 2023-10-20 VITALS — BODY MASS INDEX: 20.16 KG/M2 | HEIGHT: 65 IN | WEIGHT: 121 LBS

## 2023-10-20 DIAGNOSIS — G43.109 MIGRAINE WITH AURA AND WITHOUT STATUS MIGRAINOSUS, NOT INTRACTABLE: Primary | ICD-10-CM

## 2023-10-20 PROCEDURE — 99205 OFFICE O/P NEW HI 60 MIN: CPT | Performed by: PSYCHIATRY & NEUROLOGY

## 2023-10-20 RX ORDER — DOXYCYCLINE 100 MG/1
CAPSULE ORAL
COMMUNITY
Start: 2023-08-17

## 2023-10-20 NOTE — NURSING NOTE
Chief Complaint   Patient presents with    Headache     Migraines that began April 2023- having at least 1 time per month      Kim Lei CMA on 10/20/2023 at 8:39 AM  [employer]

## 2023-10-20 NOTE — LETTER
10/20/2023         RE: Renata Staton  [address]  [facility] [address]        Dear Colleague,    Thank you for referring your patient, Renata Staton, to the [facility] [place]. Please see a copy of my visit note below.    NEUROLOGY CONSULTATION NOTE       [facility] [place]  [address] [place] [address]  Tel: [phone]  Fax: [phone]  [email or web address]     Renata Staton,  1995, MRN [id number]  PCP: Andria Hernández  Date: 10/20/2023     ASSESSMENT & PLAN     Visit Diagnosis  Migraine with aura and without status migrainosus, not intractable     Migraine headache with aura  28-year-old female with history of depression with anxiety, vitamin B12 and D deficiency was referred for evaluation of progressively worsening headaches.  She has migraine headache with aura that usually are triggered close to her menstrual cycle.  I offered to start her on prophylactic agent but she wants to try Aleve first.  I have recommended:    1.  Take Aleve 200 mg daily for 5 days during her menstrual cycle  2.  If she fails to show improvement with above measures she will let me know to consider starting her on Depakote or Topamax  3.  For abortive treatment we will need to use CGRP antagonists as she is allergic to triptan  4.  Follow-up will be in as needed basis    Thank you again for this referral, please feel free to contact me if you have any questions.    Callum Benitez MD  [facility]  (Formerly, [facility]     REASON FOR CONSULTATION Headache        HISTORY OF PRESENT ILLNESS     We have been requested by Dr. Hernández to evaluate Renata Staton who is a 28 year old  female for headache    Patient is a 28-year-old female with history of depression and anxiety, B12 deficiency, vitamin D deficiency who was referred for evaluation of progressively worsening headaches.   According to patient she started having headaches in May 2023.  She usually gets visual symptoms when her vision gets blurry and she cannot see followed by pounding headaches.  This occurs once a month usually close to her menstrual cycle.  She tried Imitrex but developed some allergic reaction.  She gets quite nervous with visual symptoms.  She had MRI of the brain and CT scan that were normal.  Prior to these symptom onset she used to get headache but never had an aura and the headaches were not that severe.  Other than menstrual cycle she cannot identify other triggers for her headaches there is no history of any focal weakness, numbness, speech difficulty.     PROBLEM LIST   Patient Active Problem List   Diagnosis Code     Chronic pelvic pain in female R10.2, G89.29     Tobacco use in pregnancy O99.330     Acute gallstone pancreatitis K85.10     Elevated AST (SGOT) R74.01     Cholelithiasis and acute cholecystitis with obstruction K80.01     Pancreatitis, recurrent GIV5783     Smoker F17.200     Migraine with aura and without status migrainosus, not intractable G43.109     Chronic daily headache R51.9     Anxiety and depression F41.9, F32.A     B12 deficiency E53.8     Dysfunctional uterine bleeding N93.8     Gastroesophageal reflux disease K21.9     Iron deficiency anemia D50.9     Protein-calorie malnutrition, unspecified severity (H24) E46     Renal lithiasis N20.0     Vitamin D deficiency disease E55.9     Gallstone pancreatitis K85.10         PAST MEDICAL & SURGICAL HISTORY     Past Medical History:   Patient  has a past medical history of Acute pancreatitis without infection or necrosis, Anxiety, Chronic abdominal pain, Chronic pelvic pain in female, Depression, Depressive disorder (2017), History of anesthesia complications, and Migraine.    Surgical History:  She  has a past surgical history that includes Pr Lap,Diagnostic Abdomen (N/A, 02/20/2018); Laparoscopic cholecystectomy (N/A, 12/11/2019); Xr Ercp  Biliary Only (12/18/2019); Endoscopic retrograde cholangiopancreatogram (N/A, 12/18/2019); Xr Ercp Biliary Only (02/28/2020); Endoscopic retrograde cholangiopancreatogram (N/A, 02/28/2020); Abdomen surgery; and colonoscopy (10/28).     SOCIAL HISTORY     Reviewed, and she  reports that she has been smoking cigarettes. She has a 1.75 pack-year smoking history. She has never been exposed to tobacco smoke. She has never used smokeless tobacco. She reports that she does not drink alcohol and does not use drugs.     FAMILY HISTORY     Reviewed, and family history includes Asthma in her mother and sister; Cancer in her mother; Heart Disease in her sister; Mental Illness in her sister; Valvular heart disease in her paternal grandfather.     ALLERGIES     Allergies   Allergen Reactions     Penicillin G Anaphylaxis     Sumatriptan Hives, Swelling and Anxiety     Went to ED after taking 25mg for first time, no objective findings, likely not true allergy     Amoxicillin-Pot Clavulanate Hives     Iohexol Hives and Itching     Pt had hives after premedication.  She had a prior reaction in 2020 at the ER given Iohexol and today was given Iohexol again with premedication of Prednistone 50 mg 13, 7, 1 hour prior.  Pt developed hives on head and abdomen.  Stable vitals HR 95, BP 90/57, O2 SAT 99%.  Pt given water and time and symptoms improved.  No breathing or respiratory issues.   Pt check by RN from primary care clinic.  Benedict     Lansoprazole Hives     Peanut Oil Unknown     Peanut [Peanut Oil] Swelling     Throat swelling         REVIEW OF SYSTEMS     A 12 point review of system was performed and was negative except as outlined in the history of present illness.     HOME MEDICATIONS     Current Outpatient Rx   Medication Sig Dispense Refill     albuterol (PROAIR HFA/PROVENTIL HFA/VENTOLIN HFA) 108 (90 Base) MCG/ACT inhaler Inhale 2 puffs into the lungs every 6 hours as needed for shortness of breath, wheezing or cough 18 g 0  "    dimenhyDRINATE (DRAMAMINE) 50 MG CHEW Take 1 tablet (50 mg) by mouth every 6 hours as needed for other (nausea) 10 tablet 0     doxycycline monohydrate (MONODOX) 100 MG capsule take 1 capsule by mouth twice daily for 7 days       gabapentin (NEURONTIN) 100 MG capsule Take 1 capsule (100 mg) by mouth 3 times daily 20 capsule 1     hydrOXYzine (ATARAX) 25 MG tablet Take 1 tablet (25 mg) by mouth 3 times daily as needed for itching 20 tablet 0     hydrOXYzine (VISTARIL) 25 MG capsule Take 1 capsule (25 mg) by mouth 3 times daily as needed for anxiety 20 capsule 0     ondansetron (ZOFRAN ODT) 4 MG ODT tab Take 1 tablet (4 mg) by mouth every 8 hours as needed for nausea or vomiting 10 tablet 0     sertraline (ZOLOFT) 25 MG tablet Take 1 tablet (25 mg) by mouth daily 30 tablet 1     sodium chloride (OCEAN) 0.65 % nasal spray Use once a day to both nostrils to help with nasal congestion 60 mL 0     sodium chloride (OCEAN) 0.65 % nasal spray As needed for nasal congestion. 30 mL 0     dexamethasone (DECADRON) 4 MG tablet Take 1 tablet (4 mg) by mouth once for 1 dose 1 tablet 0         PHYSICAL EXAM     Vital signs  Ht 1.651 m (5' 5\")   Wt 54.9 kg (121 lb)   LMP 09/05/2023   BMI 20.14 kg/m      Weight:   121 lbs 0 oz    Patient is alert and oriented x4 in no acute distress. Vital signs were reviewed and are documented in electronic medical record. Neck was supple, no carotid bruits, thyromegaly, JVD, or lymphadenopathy was noted.   NEUROLOGY EXAM:    Patient s speech was normal with no aphasia or dysarthria. Mentation, and affect were also normal.     Funduscopic exam was normal, with normal cup to disc ratio. Cranial nerves II -XII were intact.     Patient had normal mass, tone and motor strength was 5/5 in all extremities without pronator drift.     Sensation was intact to light touch, pinprick, and vibratory sensation.     Reflexes were 1+ symmetrical with downgoing toes.     No dysmetria noted on FNF or HKS. " Romberg was negative.    Gait testing was normal. Able to walk on toes/heels. Tandem walk normal.     PERTINENT DIAGNOSTIC STUDIES     Following studies were reviewed:     CT BRAIN 6/17/2022  1.  No acute intracranial abnormality.  2.  Paranasal sinus mucosal thickening, worst involving the left maxillary sinus with left maxillary sinus air-fluid level.  3.  Left maxillary tooth periapical abscess.    MRI BRAIN 8/1/2023  Normal brain MRI     PERTINENT LABS  Following labs were reviewed:  Admission on 10/05/2023, Discharged on 10/05/2023   Component Date Value Ref Range Status     Sodium 10/05/2023 140  135 - 145 mmol/L Final     Potassium 10/05/2023 3.8  3.4 - 5.3 mmol/L Final     Chloride 10/05/2023 106  98 - 107 mmol/L Final     Carbon Dioxide (CO2) 10/05/2023 22  22 - 29 mmol/L Final     Anion Gap 10/05/2023 12  7 - 15 mmol/L Final     Urea Nitrogen 10/05/2023 8.2  6.0 - 20.0 mg/dL Final     Creatinine 10/05/2023 0.71  0.51 - 0.95 mg/dL Final     GFR Estimate 10/05/2023 >90  >60 mL/min/1.73m2 Final     Calcium 10/05/2023 9.3  8.6 - 10.0 mg/dL Final     Glucose 10/05/2023 71  70 - 99 mg/dL Final     CK 10/05/2023 119  26 - 192 U/L Final     Hold Specimen 10/05/2023 JIC   Final     WBC Count 10/05/2023 5.1  4.0 - 11.0 10e3/uL Final     RBC Count 10/05/2023 4.83  3.80 - 5.20 10e6/uL Final     Hemoglobin 10/05/2023 15.5  11.7 - 15.7 g/dL Final     Hematocrit 10/05/2023 44.1  35.0 - 47.0 % Final     MCV 10/05/2023 91  78 - 100 fL Final     MCH 10/05/2023 32.1  26.5 - 33.0 pg Final     MCHC 10/05/2023 35.1  31.5 - 36.5 g/dL Final     RDW 10/05/2023 11.5  10.0 - 15.0 % Final     Platelet Count 10/05/2023 263  150 - 450 10e3/uL Final     % Neutrophils 10/05/2023 55  % Final     % Lymphocytes 10/05/2023 37  % Final     % Monocytes 10/05/2023 6  % Final     % Eosinophils 10/05/2023 1  % Final     % Basophils 10/05/2023 1  % Final     % Immature Granulocytes 10/05/2023 0  % Final     NRBCs per 100 WBC 10/05/2023 0  <1 /100  Final     Absolute Neutrophils 10/05/2023 2.8  1.6 - 8.3 10e3/uL Final     Absolute Lymphocytes 10/05/2023 1.9  0.8 - 5.3 10e3/uL Final     Absolute Monocytes 10/05/2023 0.3  0.0 - 1.3 10e3/uL Final     Absolute Eosinophils 10/05/2023 0.0  0.0 - 0.7 10e3/uL Final     Absolute Basophils 10/05/2023 0.0  0.0 - 0.2 10e3/uL Final     Absolute Immature Granulocytes 10/05/2023 0.0  <=0.4 10e3/uL Final     Absolute NRBCs 10/05/2023 0.0  10e3/uL Final   Lab Requisition on 09/28/2023   Component Date Value Ref Range Status     Erythrocyte Sedimentation Rate 09/28/2023 9  0 - 20 mm/hr Final     CRP Inflammation 09/28/2023 <3.00  <5.00 mg/L Final     Lyme Disease Antibodies Total 09/28/2023 0.10  <0.90 Final     Rheumatoid Factor 09/28/2023 <6  <12 IU/mL Final     EMPERATRIZ interpretation 09/28/2023 Negative  Negative Final   Admission on 09/26/2023, Discharged on 09/26/2023   Component Date Value Ref Range Status     Influenza A PCR 09/26/2023 Negative  Negative Final     Influenza B PCR 09/26/2023 Negative  Negative Final     RSV PCR 09/26/2023 Negative  Negative Final     SARS CoV2 PCR 09/26/2023 Negative  Negative Final   Admission on 08/28/2023, Discharged on 08/28/2023   Component Date Value Ref Range Status     WBC Count 08/28/2023 8.6  4.0 - 11.0 10e3/uL Final     RBC Count 08/28/2023 4.70  3.80 - 5.20 10e6/uL Final     Hemoglobin 08/28/2023 14.9  11.7 - 15.7 g/dL Final     Hematocrit 08/28/2023 43.6  35.0 - 47.0 % Final     MCV 08/28/2023 93  78 - 100 fL Final     MCH 08/28/2023 31.7  26.5 - 33.0 pg Final     MCHC 08/28/2023 34.2  31.5 - 36.5 g/dL Final     RDW 08/28/2023 11.9  10.0 - 15.0 % Final     Platelet Count 08/28/2023 248  150 - 450 10e3/uL Final     Sodium 08/28/2023 139  136 - 145 mmol/L Final     Potassium 08/28/2023 4.0  3.4 - 5.3 mmol/L Final     Chloride 08/28/2023 106  98 - 107 mmol/L Final     Carbon Dioxide (CO2) 08/28/2023 23  22 - 29 mmol/L Final     Anion Gap 08/28/2023 10  7 - 15 mmol/L Final     Urea  Nitrogen 08/28/2023 6.5  6.0 - 20.0 mg/dL Final     Creatinine 08/28/2023 0.63  0.51 - 0.95 mg/dL Final     Calcium 08/28/2023 9.3  8.6 - 10.0 mg/dL Final     Glucose 08/28/2023 87  70 - 99 mg/dL Final     GFR Estimate 08/28/2023 >90  >60 mL/min/1.73m2 Final     Magnesium 08/28/2023 2.1  1.7 - 2.3 mg/dL Final     Ventricular Rate 08/28/2023 83  BPM Final     Atrial Rate 08/28/2023 83  BPM Final     MT Interval 08/28/2023 118  ms Final     QRS Duration 08/28/2023 72  ms Final     QT 08/28/2023 350  ms Final     QTc 08/28/2023 411  ms Final     P Axis 08/28/2023 51  degrees Final     R AXIS 08/28/2023 71  degrees Final     T Axis 08/28/2023 47  degrees Final     Interpretation ECG 08/28/2023    Final                    Value:Sinus rhythm  Normal ECG  When compared with ECG of 18-MAY-2023 18:59,  No significant change was found  Confirmed by SEE ED PROVIDER NOTE FOR, ECG INTERPRETATION ([id number]),  FLY GARCIA ([id number]) on 9/1/2023 1:05:31 AM       Troponin T, High Sensitivity 08/28/2023 <6  <=14 ng/L Final     D-Dimer Quantitative 08/28/2023 0.32  0.00 - 0.50 ug/mL FEU Final     TSH 08/28/2023 1.36  0.30 - 4.20 uIU/mL Final     Influenza A PCR 08/28/2023 Negative  Negative Final     Influenza B PCR 08/28/2023 Negative  Negative Final     RSV PCR 08/28/2023 Negative  Negative Final     SARS CoV2 PCR 08/28/2023 Negative  Negative Final   Admission on 08/01/2023, Discharged on 08/01/2023   Component Date Value Ref Range Status     Sodium 08/01/2023 137  136 - 145 mmol/L Final     Potassium 08/01/2023 3.5  3.4 - 5.3 mmol/L Final     Chloride 08/01/2023 104  98 - 107 mmol/L Final     Carbon Dioxide (CO2) 08/01/2023 21 (L)  22 - 29 mmol/L Final     Anion Gap 08/01/2023 12  7 - 15 mmol/L Final     Urea Nitrogen 08/01/2023 7.2  6.0 - 20.0 mg/dL Final     Creatinine 08/01/2023 0.64  0.51 - 0.95 mg/dL Final     Calcium 08/01/2023 9.9  8.6 - 10.0 mg/dL Final     Glucose 08/01/2023 90  70 - 99 mg/dL Final     Alkaline  Phosphatase 08/01/2023 101  35 - 104 U/L Final     AST 08/01/2023 24  0 - 45 U/L Final     ALT 08/01/2023 18  0 - 50 U/L Final     Protein Total 08/01/2023 7.8  6.4 - 8.3 g/dL Final     Albumin 08/01/2023 4.8  3.5 - 5.2 g/dL Final     Bilirubin Total 08/01/2023 0.4  <=1.2 mg/dL Final     GFR Estimate 08/01/2023 >90  >60 mL/min/1.73m2 Final     hCG Serum Qualitative 08/01/2023 Negative  Negative Final     Group A strep by PCR 08/01/2023 Not Detected  Not Detected Final     WBC Count 08/01/2023 8.0  4.0 - 11.0 10e3/uL Final     RBC Count 08/01/2023 4.54  3.80 - 5.20 10e6/uL Final     Hemoglobin 08/01/2023 14.3  11.7 - 15.7 g/dL Final     Hematocrit 08/01/2023 41.6  35.0 - 47.0 % Final     MCV 08/01/2023 92  78 - 100 fL Final     MCH 08/01/2023 31.5  26.5 - 33.0 pg Final     MCHC 08/01/2023 34.4  31.5 - 36.5 g/dL Final     RDW 08/01/2023 11.6  10.0 - 15.0 % Final     Platelet Count 08/01/2023 297  150 - 450 10e3/uL Final     % Neutrophils 08/01/2023 61  % Final     % Lymphocytes 08/01/2023 30  % Final     % Monocytes 08/01/2023 8  % Final     % Eosinophils 08/01/2023 0  % Final     % Basophils 08/01/2023 1  % Final     % Immature Granulocytes 08/01/2023 0  % Final     NRBCs per 100 WBC 08/01/2023 0  <1 /100 Final     Absolute Neutrophils 08/01/2023 4.9  1.6 - 8.3 10e3/uL Final     Absolute Lymphocytes 08/01/2023 2.4  0.8 - 5.3 10e3/uL Final     Absolute Monocytes 08/01/2023 0.6  0.0 - 1.3 10e3/uL Final     Absolute Eosinophils 08/01/2023 0.0  0.0 - 0.7 10e3/uL Final     Absolute Basophils 08/01/2023 0.0  0.0 - 0.2 10e3/uL Final     Absolute Immature Granulocytes 08/01/2023 0.0  <=0.4 10e3/uL Final     Absolute NRBCs 08/01/2023 0.0  10e3/uL Final        Total time spent for face to face visit, reviewing labs/imaging studies, counseling and coordination of care was: 1 Hour spent on the date of the encounter doing chart review, review of outside records, review of test results, interpretation of tests, patient visit, and  documentation     This note was dictated using voice recognition software.  Any grammatical or context distortions are unintentional and inherent to the software.    No orders of the defined types were placed in this encounter.     New Prescriptions    No medications on file      Modified Medications    No medications on file                Again, thank you for allowing me to participate in the care of your patient.        Sincerely,        Callum Benitez MD

## 2024-01-06 NOTE — ED TRIAGE NOTES
"Iron infusion Monday for anemia, gradul increase in right leg pain, \"low grade fever & SOB\". She is Tachy in TRIAG, anxious. Pain in right leg a 9. Denies HX of clots or PE.     Triage Assessment     Row Name 06/16/22 [id number]       Triage Assessment (Adult)    Airway WDL WDL       Respiratory WDL    Respiratory WDL X  complains, SOB       Skin Circulation/Temperature WDL    Skin Circulation/Temperature WDL WDL       Cardiac WDL    Cardiac WDL X  tachy       Peripheral/Neurovascular WDL    Peripheral Neurovascular WDL WDL       Cognitive/Neuro/Behavioral WDL    Cognitive/Neuro/Behavioral WDL WDL              " No

## 2024-06-24 ENCOUNTER — HOSPITAL ENCOUNTER (EMERGENCY)
Facility: HOSPITAL | Age: 29
Discharge: HOME | End: 2024-06-24
Attending: EMERGENCY MEDICINE | Admitting: EMERGENCY MEDICINE
Payer: COMMERCIAL

## 2024-06-24 VITALS
TEMPERATURE: 97.6 F | WEIGHT: 140 LBS | HEART RATE: 82 BPM | HEIGHT: 65 IN | DIASTOLIC BLOOD PRESSURE: 66 MMHG | BODY MASS INDEX: 23.32 KG/M2 | RESPIRATION RATE: 24 BRPM | OXYGEN SATURATION: 98 % | SYSTOLIC BLOOD PRESSURE: 113 MMHG

## 2024-06-24 DIAGNOSIS — G43.909 MIGRAINE WITHOUT STATUS MIGRAINOSUS, NOT INTRACTABLE, UNSPECIFIED MIGRAINE TYPE: ICD-10-CM

## 2024-06-24 LAB
ALBUMIN UR-MCNC: NEGATIVE MG/DL
ANION GAP SERPL CALCULATED.3IONS-SCNC: 12 MMOL/L (ref 7–15)
APPEARANCE UR: CLEAR
BILIRUB UR QL STRIP: NEGATIVE
BUN SERPL-MCNC: 6.5 MG/DL (ref 6–20)
CALCIUM SERPL-MCNC: 9.7 MG/DL (ref 8.6–10)
CHLORIDE SERPL-SCNC: 105 MMOL/L (ref 98–107)
COLOR UR AUTO: COLORLESS
CREAT SERPL-MCNC: 0.68 MG/DL (ref 0.51–0.95)
DEPRECATED HCO3 PLAS-SCNC: 22 MMOL/L (ref 22–29)
EGFRCR SERPLBLD CKD-EPI 2021: >90 ML/MIN/1.73M2
GLUCOSE SERPL-MCNC: 99 MG/DL (ref 70–99)
GLUCOSE UR STRIP-MCNC: NEGATIVE MG/DL
HCG SERPL QL: NEGATIVE
HGB UR QL STRIP: NEGATIVE
KETONES UR STRIP-MCNC: NEGATIVE MG/DL
LEUKOCYTE ESTERASE UR QL STRIP: NEGATIVE
NITRATE UR QL: NEGATIVE
PH UR STRIP: 7 [PH] (ref 5–7)
POTASSIUM SERPL-SCNC: 3.7 MMOL/L (ref 3.4–5.3)
RBC URINE: 0 /HPF
SODIUM SERPL-SCNC: 139 MMOL/L (ref 135–145)
SP GR UR STRIP: 1 (ref 1–1.03)
UROBILINOGEN UR STRIP-MCNC: <2 MG/DL
WBC URINE: 0 /HPF

## 2024-06-24 PROCEDURE — 36415 COLL VENOUS BLD VENIPUNCTURE: CPT | Performed by: EMERGENCY MEDICINE

## 2024-06-24 PROCEDURE — 250N000011 HC RX IP 250 OP 636: Mod: JZ | Performed by: EMERGENCY MEDICINE

## 2024-06-24 PROCEDURE — 96375 TX/PRO/DX INJ NEW DRUG ADDON: CPT

## 2024-06-24 PROCEDURE — 80048 BASIC METABOLIC PNL TOTAL CA: CPT | Performed by: EMERGENCY MEDICINE

## 2024-06-24 PROCEDURE — 81001 URINALYSIS AUTO W/SCOPE: CPT | Performed by: STUDENT IN AN ORGANIZED HEALTH CARE EDUCATION/TRAINING PROGRAM

## 2024-06-24 PROCEDURE — 84703 CHORIONIC GONADOTROPIN ASSAY: CPT | Performed by: EMERGENCY MEDICINE

## 2024-06-24 PROCEDURE — 258N000003 HC RX IP 258 OP 636: Mod: JZ | Performed by: EMERGENCY MEDICINE

## 2024-06-24 PROCEDURE — 96374 THER/PROPH/DIAG INJ IV PUSH: CPT

## 2024-06-24 PROCEDURE — 96361 HYDRATE IV INFUSION ADD-ON: CPT

## 2024-06-24 PROCEDURE — 93005 ELECTROCARDIOGRAM TRACING: CPT | Performed by: STUDENT IN AN ORGANIZED HEALTH CARE EDUCATION/TRAINING PROGRAM

## 2024-06-24 PROCEDURE — 99284 EMERGENCY DEPT VISIT MOD MDM: CPT | Mod: 25

## 2024-06-24 RX ORDER — DIPHENHYDRAMINE HYDROCHLORIDE 50 MG/ML
25 INJECTION INTRAMUSCULAR; INTRAVENOUS ONCE
Status: COMPLETED | OUTPATIENT
Start: 2024-06-24 | End: 2024-06-24

## 2024-06-24 RX ORDER — LORAZEPAM 2 MG/ML
1 INJECTION INTRAMUSCULAR ONCE
Status: COMPLETED | OUTPATIENT
Start: 2024-06-24 | End: 2024-06-24

## 2024-06-24 RX ORDER — KETOROLAC TROMETHAMINE 15 MG/ML
15 INJECTION, SOLUTION INTRAMUSCULAR; INTRAVENOUS ONCE
Status: COMPLETED | OUTPATIENT
Start: 2024-06-24 | End: 2024-06-24

## 2024-06-24 RX ADMIN — DIPHENHYDRAMINE HYDROCHLORIDE 25 MG: 50 INJECTION, SOLUTION INTRAMUSCULAR; INTRAVENOUS at 13:22

## 2024-06-24 RX ADMIN — LORAZEPAM 1 MG: 2 INJECTION INTRAMUSCULAR; INTRAVENOUS at 12:36

## 2024-06-24 RX ADMIN — KETOROLAC TROMETHAMINE 15 MG: 15 INJECTION, SOLUTION INTRAMUSCULAR; INTRAVENOUS at 13:22

## 2024-06-24 RX ADMIN — PROCHLORPERAZINE EDISYLATE 10 MG: 5 INJECTION INTRAMUSCULAR; INTRAVENOUS at 13:22

## 2024-06-24 RX ADMIN — SODIUM CHLORIDE 1000 ML: 9 INJECTION, SOLUTION INTRAVENOUS at 12:28

## 2024-06-24 ASSESSMENT — ACTIVITIES OF DAILY LIVING (ADL)
ADLS_ACUITY_SCORE: 35

## 2024-06-24 NOTE — DISCHARGE INSTRUCTIONS
You were seen in the emergency department for headache and numbness that we think was related to a breakthrough migraine headache.  We do not suspect any more serious cause of the symptoms today.  Try to follow-up as soon as you can with your primary and/your neurologist after this ED visit to review this episode and any ongoing symptoms.

## 2024-06-24 NOTE — ED NOTES
Bed: [id number]  Expected date: 6/24/24  Expected time: 11:33 AM  Means of arrival: Ambulance  Comments:  Michael  28F  Migraine

## 2024-06-24 NOTE — Clinical Note
Renata Staton was seen and treated in our emergency department on 6/24/2024.  She may return to work on 06/26/2024.       If you have any questions or concerns, please don't hesitate to call.      Giuseppe Fox MD

## 2024-06-24 NOTE — ED TRIAGE NOTES
Arrives via EMS with c/o anxiety, panic, and headache 10/10. Has hx of migraines and generalized anxiety disorder. VSS ex slightly tachy and hypertensive      Triage Assessment (Adult)       Row Name 06/24/24 1144          Triage Assessment    Airway WDL WDL        Respiratory WDL    Respiratory WDL WDL        Skin Circulation/Temperature WDL    Skin Circulation/Temperature WDL WDL        Cardiac WDL    Cardiac WDL WDL        Peripheral/Neurovascular WDL    Peripheral Neurovascular WDL WDL        Cognitive/Neuro/Behavioral WDL    Cognitive/Neuro/Behavioral WDL X;mood/behavior     Level of Consciousness alert     Mood/Behavior anxious        Pupils (CN II)    Pupil PERRLA yes     Pupil Size Left 3 mm     Pupil Size Right 3 mm        Tommy Coma Scale    Best Eye Response 4-->(E4) spontaneous     Best Motor Response 6-->(M6) obeys commands     Best Verbal Response 5-->(V5) oriented     [place] Coma Scale Score 15

## 2024-06-24 NOTE — ED PROVIDER NOTES
EMERGENCY DEPARTMENT ENCOUNTER      NAME: Renata Staton  AGE: 28 year old female  YOB: 1995  MRN: [id number]  EVALUATION DATE & TIME: 2024 11:37 AM    PCP: Andria Hernández    ED PROVIDER: Giuseppe Fox M.D.      Chief Complaint   Patient presents with    Anxiety    Headache         FINAL IMPRESSION:  1. Migraine without status migrainosus, not intractable, unspecified migraine type          ED COURSE & MEDICAL DECISION MAKIN year old female presents to the Emergency Department for evaluation of headache and paresthesias.  Patient has a history of migraine headaches.  She presents with waxing and waning paresthesias primarily involving her right arm as well as intermittent headaches today.  Headaches seem consistent with her previous migraine pattern.  Her exam is notable for some subjective decrease sensation in a glove like distribution in the right hand but no motor weakness.  I can see she has had at least 2 MRIs in the last couple of years for similar headache plus paresthesias symptoms and I have an exceedingly low suspicion for any kind of acute stroke or other intracranial process to account for the symptoms.  Proceeded with symptomatic management and patient ultimately received some IV fluids, Ativan, Compazine and Benadryl.  Her paresthesias are significantly improved on recheck, little while after the Ativan she did develop a bit more of a pronounced headache.  This was persistent but somewhat improved on recheck and patient was eager to discharge home to rest.  As mentioned above, no suspicion for intracranial process to require neuroimaging in light of stable exam, prior history, and improving symptoms.  Patient was discharged in stable condition.    At the conclusion of the encounter I discussed the results of all of the tests and the disposition. The questions were answered. The patient or family acknowledged understanding and was agreeable with the care plan.      11:40 AM I met with the patient for the initial interview and physical examination. Discussed plan for treatment and workup in the ED.       Medical Decision Making  Obtained supplemental history:Supplemental history obtained?: No  Reviewed external records: External records reviewed?: Documented in chart and Prior Imaging: brain MRI  Care impacted by chronic illness:Other: migraine, anxiety  Care significantly affected by social determinants of health:N/A  Did you consider but not order tests?: Work up considered but not performed and documented in chart, if applicable  Did you interpret images independently?: Independent interpretation of ECG and images noted in documentation, when applicable.  Consultation discussion with other provider:Did you involve another provider (consultant, , pharmacy, etc.)?: No  Discharge. No recommendations on prescription strength medication(s). N/A.        MEDICATIONS GIVEN IN THE EMERGENCY:  Medications   sodium chloride 0.9% BOLUS 1,000 mL (0 mLs Intravenous Stopped 6/24/24 1319)   LORazepam (ATIVAN) injection 1 mg (1 mg Intravenous $Given 6/24/24 1236)   prochlorperazine (COMPAZINE) injection 10 mg (10 mg Intravenous $Given 6/24/24 1322)   diphenhydrAMINE (BENADRYL) injection 25 mg (25 mg Intravenous $Given 6/24/24 1322)   ketorolac (TORADOL) injection 15 mg (15 mg Intravenous $Given 6/24/24 1322)       NEW PRESCRIPTIONS STARTED AT TODAY'S ER VISIT  Discharge Medication List as of 6/24/2024  2:21 PM             =================================================================    HPI    Patient information was obtained from: patient     Use of : N/A         Renata Staton is a 28 year old female with a pertinent history of migraine or anxiety who presents to this ED via personal vehicle for evaluation of migraine.     Patient reports at 9 AM she had an onset of a silent migraine with her peripheral visio pain and a headache.  Headache improved but then her  right hand to the wrist but then her right hand to the wrist in the right side of her tongue went numb and tingly.  States this is usually improved after an hour, but is not the case today.  Took ibuprofen at 9:30 AM.  Has a history of migraine paresthesias.  Was able to walk.      REVIEW OF SYSTEMS   All systems reviewed and negative except as noted in HPI.    PAST MEDICAL HISTORY:  Past Medical History:   Diagnosis Date    Acute pancreatitis without infection or necrosis     Anxiety     Chronic abdominal pain     Chronic pelvic pain in female     Depression     Depressive disorder 2017    History of anesthesia complications     family issues slow to wake up after surgery    Migraine        PAST SURGICAL HISTORY:  Past Surgical History:   Procedure Laterality Date    ABDOMEN SURGERY      COLONOSCOPY  10/28    ENDOSCOPIC RETROGRADE CHOLANGIOPANCREATOGRAM N/A 12/18/2019    Procedure: ENDOSCOPIC RETROGRADE CHOLANGIOPANCREATOGRAPHY with billary sphinctomy and stone extraction and  billary stent placement with epinephrine injection ;  Surgeon: Dustin Betancourt MD;  Location: [facility];  Service: Gastroenterology    ENDOSCOPIC RETROGRADE CHOLANGIOPANCREATOGRAM N/A 02/28/2020    Procedure: ENDOSCOPIC RETROGRADE CHOLANGIOPANCREATOGRAPHY, STENT REMOVAL, SLUDGE REMOVAL;  Surgeon: Dustin Betancourt MD;  Location: [facility] OR;  Service: Gastroenterology    LAPAROSCOPIC CHOLECYSTECTOMY N/A 12/11/2019    Procedure: CHOLECYSTECTOMY, LAPAROSCOPIC;  Surgeon: Samia Portillo MD;  Location: [facility];  Service: General    OK LAP,DIAGNOSTIC ABDOMEN N/A 02/20/2018    Procedure: DIAGNOSTIC LAPAROSCOPY, LYSIS OF  ADHESIONS;  Surgeon: Manish Lyons MD;  Location: [facility];  Service: Gynecology    XR ERCP BILIARY ONLY  12/18/2019    XR ERCP BILIARY ONLY  02/28/2020           CURRENT MEDICATIONS:    No current facility-administered medications for this encounter.     Current Outpatient  Medications   Medication Sig Dispense Refill    albuterol (PROAIR HFA/PROVENTIL HFA/VENTOLIN HFA) 108 (90 Base) MCG/ACT inhaler Inhale 2 puffs into the lungs every 6 hours as needed for shortness of breath, wheezing or cough 18 g 0    dexamethasone (DECADRON) 4 MG tablet Take 1 tablet (4 mg) by mouth once for 1 dose 1 tablet 0    dimenhyDRINATE (DRAMAMINE) 50 MG CHEW Take 1 tablet (50 mg) by mouth every 6 hours as needed for other (nausea) 10 tablet 0    doxycycline monohydrate (MONODOX) 100 MG capsule take 1 capsule by mouth twice daily for 7 days      gabapentin (NEURONTIN) 100 MG capsule Take 1 capsule (100 mg) by mouth 3 times daily 20 capsule 1    hydrOXYzine (ATARAX) 25 MG tablet Take 1 tablet (25 mg) by mouth 3 times daily as needed for itching 20 tablet 0    hydrOXYzine (VISTARIL) 25 MG capsule Take 1 capsule (25 mg) by mouth 3 times daily as needed for anxiety 20 capsule 0    ondansetron (ZOFRAN ODT) 4 MG ODT tab Take 1 tablet (4 mg) by mouth every 8 hours as needed for nausea or vomiting 10 tablet 0    sertraline (ZOLOFT) 25 MG tablet Take 1 tablet (25 mg) by mouth daily 30 tablet 1    sodium chloride (OCEAN) 0.65 % nasal spray Use once a day to both nostrils to help with nasal congestion 60 mL 0    sodium chloride (OCEAN) 0.65 % nasal spray As needed for nasal congestion. 30 mL 0         ALLERGIES:  Allergies   Allergen Reactions    Penicillin G Anaphylaxis    Sumatriptan Hives, Swelling and Anxiety     Went to ED after taking 25mg for first time, no objective findings, likely not true allergy    Amoxicillin-Pot Clavulanate Hives    Iohexol Hives and Itching     Pt had hives after premedication.  She had a prior reaction in 2020 at the ER given Iohexol and today was given Iohexol again with premedication of Prednistone 50 mg 13, 7, 1 hour prior.  Pt developed hives on head and abdomen.  Stable vitals HR 95, BP 90/57, O2 SAT 99%.  Pt given water and time and symptoms improved.  No breathing or respiratory  issues.   Pt check by RN from primary care clinic.  Benedict    Lansoprazole Hives    Peanut Oil Unknown    Peanut [Peanut Oil] Swelling     Throat swelling       FAMILY HISTORY:  Family History   Problem Relation Age of Onset    Cancer Mother     Asthma Mother     Asthma Sister     Mental Illness Sister     Heart Disease Sister     Valvular heart disease Paternal Grandfather        SOCIAL HISTORY:   Social History     Socioeconomic History    Marital status: Single   Tobacco Use    Smoking status: Every Day     Current packs/day: 0.25     Average packs/day: 0.3 packs/day for 7.0 years (1.8 ttl pk-yrs)     Types: Cigarettes     Passive exposure: Never    Smokeless tobacco: Never    Tobacco comments:     7-8 cpd.   Vaping Use    Vaping status: Never Used   Substance and Sexual Activity    Alcohol use: No     Comment: Alcoholic Drinks/day: rare    Drug use: Never    Sexual activity: Yes     Partners: Male     Birth control/protection: Condom   Other Topics Concern    Parent/sibling w/ CABG, MI or angioplasty before 65F 55M? No     Social Determinants of Health     Financial Resource Strain: Low Risk  (10/20/2023)    Received from [facility]    Financial Resource Strain     Difficulty of Paying Living Expenses: 3   Food Insecurity: No Food Insecurity (10/20/2023)    Received from [facility]    Food Insecurity     Worried About Running Out of Food in the Last Year: 1   Transportation Needs: No Transportation Needs (10/20/2023)    Received from [facility]    Transportation Needs     Lack of Transportation (Medical): 1   Social Connections: Socially Integrated (10/20/2023)    Received from [facility]    Social Connections     Frequency of Communication with Friends and Family: 0   Housing Stability: Low Risk  (10/20/2023)    Received from [facility]     "Housing Stability     Unable to Pay for Housing in the Last Year: 1       VITALS:  /66   Pulse 82   Temp 97.6  F (36.4  C) (Oral)   Resp 24   Ht 1.651 m (5' 5\")   Wt 63.5 kg (140 lb)   SpO2 98%   BMI 23.30 kg/m      PHYSICAL EXAM    Constitutional: Well developed, Well nourished, NAD.  HENT: Normocephalic, Atraumatic. Neck Supple.  Eyes: EOMI, Conjunctiva normal.  Respiratory: Breathing comfortably on room air. Speaks full sentences easily. Lungs clear to ascultation.  Cardiovascular: Normal heart rate, Regular rhythm. No peripheral edema.  Abdomen: Soft  Musculoskeletal: Good range of motion in all major joints. No major deformities noted.  Integument: Warm, Dry.  Neurologic: Fully awake, alert, oriented.  Face is symmetric.  Speech is normal.  Cranial nerves II through XII intact.  Strength is 5 out of 5 throughout bilateral upper and lower extremities.  Sensation to light touch intact x4.  Finger-nose-finger testing is normal.  Patient is ambulatory.  Psychiatric: Cooperative. Affect appropriate.     LAB:  All pertinent labs reviewed and interpreted.  Labs Ordered and Resulted from Time of ED Arrival to Time of ED Departure   ROUTINE UA WITH MICROSCOPIC REFLEX TO CULTURE - Normal       Result Value    Color Urine Colorless      Appearance Urine Clear      Glucose Urine Negative      Bilirubin Urine Negative      Ketones Urine Negative      Specific Gravity Urine 1.001      Blood Urine Negative      pH Urine 7.0      Protein Albumin Urine Negative      Urobilinogen Urine <2.0      Nitrite Urine Negative      Leukocyte Esterase Urine Negative      RBC Urine 0      WBC Urine 0     HCG QUALITATIVE PREGNANCY - Normal    hCG Serum Qualitative Negative     BASIC METABOLIC PANEL - Normal    Sodium 139      Potassium 3.7      Chloride 105      Carbon Dioxide (CO2) 22      Anion Gap 12      Urea Nitrogen 6.5      Creatinine 0.68      GFR Estimate >90      Calcium 9.7      Glucose 99   "       RADIOLOGY:  Reviewed all pertinent imaging. Please see official radiology report.  No orders to display       EKG:    Performed at: 6/24/2024 11:42 AM  Impression: Normal sinus rhythm.  Nonspecific ST and T wave abnormality  Rate: 83 BPM  Rhythm: Sinus  VA Interval: 112 ms  QRS Interval: 76 ms  QTc Interval: 25 ms  Comparison: 8/28/2023 no segment changes found    I have independently reviewed and interpreted the EKG(s) documented above.     PROCEDURES:   N/A       I, Sarah Lazo, am serving as a scribe to document services personally performed by Dr. Giuseppe Fox, based on my observation and the provider's statements to me. I, Giuseppe Fox MD attest that Sarah Lazo is acting in a scribe capacity, has observed my performance of the services and has documented them in accordance with my direction.    Giuseppe Fox M.D.  Emergency Medicine  [facility] EMERGENCY DEPARTMENT  [address] [place] [id number] [phone]  Dept: [phone]       Giuseppe Fox MD  06/24/24 7996

## 2024-07-03 ENCOUNTER — VIRTUAL VISIT (OUTPATIENT)
Dept: FAMILY MEDICINE | Facility: CLINIC | Age: 29
End: 2024-07-03
Payer: COMMERCIAL

## 2024-07-03 DIAGNOSIS — G43.711 INTRACTABLE CHRONIC MIGRAINE WITHOUT AURA AND WITH STATUS MIGRAINOSUS: ICD-10-CM

## 2024-07-03 DIAGNOSIS — F41.1 GENERALIZED ANXIETY DISORDER: ICD-10-CM

## 2024-07-03 DIAGNOSIS — J30.1 SEASONAL ALLERGIC RHINITIS DUE TO POLLEN: Primary | ICD-10-CM

## 2024-07-03 DIAGNOSIS — R11.0 NAUSEA: ICD-10-CM

## 2024-07-03 PROBLEM — E46 PROTEIN-CALORIE MALNUTRITION, UNSPECIFIED SEVERITY (H): Status: RESOLVED | Noted: 2022-04-14 | Resolved: 2024-07-03

## 2024-07-03 PROCEDURE — 99214 OFFICE O/P EST MOD 30 MIN: CPT | Mod: 95 | Performed by: FAMILY MEDICINE

## 2024-07-03 RX ORDER — LORATADINE 10 MG/1
10 TABLET ORAL DAILY
Qty: 90 TABLET | Refills: 3 | Status: SHIPPED | OUTPATIENT
Start: 2024-07-03

## 2024-07-03 RX ORDER — LORATADINE 10 MG/1
10 TABLET ORAL
COMMUNITY
Start: 2023-09-21 | End: 2024-07-03

## 2024-07-03 RX ORDER — KETOROLAC TROMETHAMINE 10 MG/1
10 TABLET, FILM COATED ORAL EVERY 6 HOURS PRN
Qty: 20 TABLET | Refills: 0 | Status: SHIPPED | OUTPATIENT
Start: 2024-07-03

## 2024-07-03 RX ORDER — SERTRALINE HYDROCHLORIDE 25 MG/1
25 TABLET, FILM COATED ORAL DAILY
Qty: 90 TABLET | Refills: 1 | Status: SHIPPED | OUTPATIENT
Start: 2024-07-03

## 2024-07-03 RX ORDER — ONDANSETRON 4 MG/1
4 TABLET, ORALLY DISINTEGRATING ORAL EVERY 8 HOURS PRN
Qty: 10 TABLET | Refills: 0 | Status: SHIPPED | OUTPATIENT
Start: 2024-07-03

## 2024-07-03 ASSESSMENT — PATIENT HEALTH QUESTIONNAIRE - PHQ9
10. IF YOU CHECKED OFF ANY PROBLEMS, HOW DIFFICULT HAVE THESE PROBLEMS MADE IT FOR YOU TO DO YOUR WORK, TAKE CARE OF THINGS AT HOME, OR GET ALONG WITH OTHER PEOPLE: VERY DIFFICULT
SUM OF ALL RESPONSES TO PHQ QUESTIONS 1-9: 18
SUM OF ALL RESPONSES TO PHQ QUESTIONS 1-9: 18

## 2024-07-03 NOTE — PROGRESS NOTES
Gloria is a 28 year old who is being evaluated via a billable video visit.        Assessment & Plan     Generalized anxiety disorder  Chronic, stable. Patient reports that her mood is fairly controlled on medication but would want to add therapy for adjunctive treatment.   - sertraline (ZOLOFT) 25 MG tablet  Dispense: 90 tablet; Refill: 1  - Adult Mental Health  Referral    Seasonal allergic rhinitis due to pollen  Chronic, stable.   - loratadine (CLARITIN) 10 MG tablet  Dispense: 90 tablet; Refill: 3    Nausea  Stable.   - ondansetron (ZOFRAN ODT) 4 MG ODT tab  Dispense: 10 tablet; Refill: 0    Intractable chronic migraine without aura and with status migrainosus  Chronic, not well controlled. Difficult as patient's migraines correlate with her menses, and she experiences aura. She also has allergy to triptans. Would not be a good candidate for topamax due to history of kidney stones. Could consider SNRI if patient agreeable, will send to neuro for management.   - ketorolac (TORADOL) 10 MG tablet  Dispense: 20 tablet; Refill: 0  - Adult Neurology  Referral            Nicotine/Tobacco Cessation  She reports that she has been smoking cigarettes. She has a 1.8 pack-year smoking history. She has never been exposed to tobacco smoke. She has never used smokeless tobacco.  Nicotine/Tobacco Cessation Plan  Information offered: Patient not interested at this time      Depression Screening Follow Up        7/3/2024     6:59 AM   PHQ   PHQ-9 Total Score 18   Q9: Thoughts of better off dead/self-harm past 2 weeks Not at all         7/3/2024     6:59 AM   Last PHQ-9   1.  Little interest or pleasure in doing things 2   2.  Feeling down, depressed, or hopeless 2   3.  Trouble falling or staying asleep, or sleeping too much 2   4.  Feeling tired or having little energy 2   5.  Poor appetite or overeating 2   6.  Feeling bad about yourself 2   7.  Trouble concentrating 3   8.  Moving slowly or restless 3   Q9:  Thoughts of better off dead/self-harm past 2 weeks 0   PHQ-9 Total Score 18         Follow Up Actions Taken  Patient counseled, no additional follow up at this time.       See Patient Instructions    Swati Castro is a 28 year old, presenting for the following health issues:  Anemia and Recheck Medication        7/3/2024     6:56 AM   Additional Questions   Roomed by LÁZARO Hawk   Accompanied by Self     Video Start Time: 7:04 AM    History of Present Illness       Reason for visit:  Anemia    She eats 2-3 servings of fruits and vegetables daily.She consumes 2 sweetened beverage(s) daily.She exercises with enough effort to increase her heart rate 10 to 19 minutes per day.  She exercises with enough effort to increase her heart rate 5 days per week.   She is taking medications regularly.     Patient wants a primary care provider. Her labs just returned which are reassuring. Her depression is stable. She would like a refill, patient would also like a referral to mental health.     Patient has a history of iron deficiency anemia. Patient was controlled on depo provera, but found it was making her very sick. Last iron infusion in September.      History of gallstone pancreatitis, occurred after cholecystectomy.       Review of Systems  Constitutional, HEENT, cardiovascular, pulmonary, gi and gu systems are negative, except as otherwise noted.      Objective    Vitals - Patient Reported  Pain Score: Mild Pain (3)  Pain Loc: Head      Vitals:  No vitals were obtained today due to virtual visit.    Physical Exam   GENERAL: alert and no distress  EYES: Eyes grossly normal to inspection.  No discharge or erythema, or obvious scleral/conjunctival abnormalities.  RESP: No audible wheeze, cough, or visible cyanosis.    SKIN: Visible skin clear. No significant rash, abnormal pigmentation or lesions.  NEURO: Cranial nerves grossly intact.  Mentation and speech appropriate for age.  PSYCH: Appropriate affect, tone, and  pace of words        Video-Visit Details    Type of service:  Video Visit   Video End Time:7:23 AM  Originating Location (pt. Location): Home    Distant Location (provider location):  On-site  Platform used for Video Visit: Joselyn  Signed Electronically by: YESENIA MCKEON DO

## 2024-07-09 LAB
ATRIAL RATE - MUSE: 83 BPM
DIASTOLIC BLOOD PRESSURE - MUSE: NORMAL MMHG
INTERPRETATION ECG - MUSE: NORMAL
P AXIS - MUSE: 54 DEGREES
PR INTERVAL - MUSE: 112 MS
QRS DURATION - MUSE: 76 MS
QT - MUSE: 362 MS
QTC - MUSE: 425 MS
R AXIS - MUSE: 70 DEGREES
SYSTOLIC BLOOD PRESSURE - MUSE: NORMAL MMHG
T AXIS - MUSE: 34 DEGREES
VENTRICULAR RATE- MUSE: 83 BPM

## 2024-09-03 ENCOUNTER — PATIENT OUTREACH (OUTPATIENT)
Dept: CARE COORDINATION | Facility: CLINIC | Age: 29
End: 2024-09-03
Payer: COMMERCIAL

## 2024-09-28 ENCOUNTER — HEALTH MAINTENANCE LETTER (OUTPATIENT)
Age: 29
End: 2024-09-28

## 2025-02-03 ENCOUNTER — VIRTUAL VISIT (OUTPATIENT)
Dept: PSYCHOLOGY | Facility: CLINIC | Age: 30
End: 2025-02-03
Payer: COMMERCIAL

## 2025-02-03 DIAGNOSIS — F41.0 PANIC DISORDER: ICD-10-CM

## 2025-02-03 DIAGNOSIS — F41.1 GENERALIZED ANXIETY DISORDER: Primary | ICD-10-CM

## 2025-02-03 DIAGNOSIS — F32.89 OTHER DEPRESSION: ICD-10-CM

## 2025-02-03 PROCEDURE — 90791 PSYCH DIAGNOSTIC EVALUATION: CPT | Mod: 95 | Performed by: SOCIAL WORKER

## 2025-02-03 ASSESSMENT — COLUMBIA-SUICIDE SEVERITY RATING SCALE - C-SSRS
TOTAL  NUMBER OF ABORTED OR SELF INTERRUPTED ATTEMPTS LIFETIME: NO
1. HAVE YOU WISHED YOU WERE DEAD OR WISHED YOU COULD GO TO SLEEP AND NOT WAKE UP?: NO
TOTAL  NUMBER OF INTERRUPTED ATTEMPTS LIFETIME: NO
ATTEMPT LIFETIME: NO
6. HAVE YOU EVER DONE ANYTHING, STARTED TO DO ANYTHING, OR PREPARED TO DO ANYTHING TO END YOUR LIFE?: NO
2. HAVE YOU ACTUALLY HAD ANY THOUGHTS OF KILLING YOURSELF?: NO
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
## 2025-03-03 ENCOUNTER — DOCUMENTATION ONLY (OUTPATIENT)
Dept: PSYCHOLOGY | Facility: CLINIC | Age: 30
End: 2025-03-03
Payer: COMMERCIAL

## 2025-03-03 NOTE — PROGRESS NOTES
Patient had appointment scheduled at 1:30 PM today by video.  This writer logged into the video appointment and reached out to the patient by phone and was unable to connect with her.  Writer stayed in the video appointment until 1:45 PM.

## 2025-03-17 ENCOUNTER — DOCUMENTATION ONLY (OUTPATIENT)
Dept: PSYCHOLOGY | Facility: CLINIC | Age: 30
End: 2025-03-17
Payer: COMMERCIAL

## 2025-03-17 NOTE — PROGRESS NOTES
Patient had an appointment scheduled at 1:30 PM today by video.  The writer logged into the video appointment and waited until 1:45 PM as well as reached out to patient by phone and was unable to connect with her.  A voice message was left providing her with information regarding rescheduling.

## 2025-08-14 ENCOUNTER — VIRTUAL VISIT (OUTPATIENT)
Dept: INTERNAL MEDICINE | Facility: CLINIC | Age: 30
End: 2025-08-14
Payer: COMMERCIAL

## 2025-08-14 DIAGNOSIS — L73.1 INGROWN HAIR: Primary | ICD-10-CM
